# Patient Record
Sex: MALE | Race: BLACK OR AFRICAN AMERICAN | NOT HISPANIC OR LATINO | URBAN - METROPOLITAN AREA
[De-identification: names, ages, dates, MRNs, and addresses within clinical notes are randomized per-mention and may not be internally consistent; named-entity substitution may affect disease eponyms.]

---

## 2017-05-23 ENCOUNTER — EMERGENCY (EMERGENCY)
Facility: HOSPITAL | Age: 77
LOS: 1 days | Discharge: HOME | End: 2017-05-23
Attending: EMERGENCY MEDICINE
Payer: MEDICARE

## 2017-05-23 VITALS
OXYGEN SATURATION: 100 % | TEMPERATURE: 98 F | HEART RATE: 73 BPM | RESPIRATION RATE: 16 BRPM | DIASTOLIC BLOOD PRESSURE: 63 MMHG | SYSTOLIC BLOOD PRESSURE: 134 MMHG

## 2017-05-23 VITALS
DIASTOLIC BLOOD PRESSURE: 89 MMHG | HEIGHT: 68 IN | WEIGHT: 184.97 LBS | SYSTOLIC BLOOD PRESSURE: 158 MMHG | RESPIRATION RATE: 16 BRPM | HEART RATE: 78 BPM | TEMPERATURE: 99 F | OXYGEN SATURATION: 99 %

## 2017-05-23 DIAGNOSIS — M54.30 SCIATICA, UNSPECIFIED SIDE: ICD-10-CM

## 2017-05-23 DIAGNOSIS — E11.9 TYPE 2 DIABETES MELLITUS WITHOUT COMPLICATIONS: ICD-10-CM

## 2017-05-23 DIAGNOSIS — Z79.84 LONG TERM (CURRENT) USE OF ORAL HYPOGLYCEMIC DRUGS: ICD-10-CM

## 2017-05-23 DIAGNOSIS — E78.00 PURE HYPERCHOLESTEROLEMIA, UNSPECIFIED: ICD-10-CM

## 2017-05-23 LAB
APPEARANCE UR: CLEAR — SIGNIFICANT CHANGE UP
BILIRUB UR-MCNC: NEGATIVE — SIGNIFICANT CHANGE UP
COLOR SPEC: YELLOW — SIGNIFICANT CHANGE UP
DIFF PNL FLD: ABNORMAL
GLUCOSE UR QL: NEGATIVE — SIGNIFICANT CHANGE UP
KETONES UR-MCNC: NEGATIVE — SIGNIFICANT CHANGE UP
LEUKOCYTE ESTERASE UR-ACNC: NEGATIVE — SIGNIFICANT CHANGE UP
NITRITE UR-MCNC: NEGATIVE — SIGNIFICANT CHANGE UP
PH UR: 6 — SIGNIFICANT CHANGE UP (ref 5–8)
PROT UR-MCNC: 15
SP GR SPEC: 1.01 — SIGNIFICANT CHANGE UP (ref 1.01–1.02)
UROBILINOGEN FLD QL: NEGATIVE — SIGNIFICANT CHANGE UP

## 2017-05-23 PROCEDURE — 99284 EMERGENCY DEPT VISIT MOD MDM: CPT | Mod: 25

## 2017-05-23 PROCEDURE — 99284 EMERGENCY DEPT VISIT MOD MDM: CPT

## 2017-05-23 PROCEDURE — 74176 CT ABD & PELVIS W/O CONTRAST: CPT | Mod: 26

## 2017-05-23 PROCEDURE — 74176 CT ABD & PELVIS W/O CONTRAST: CPT

## 2017-05-23 PROCEDURE — 81001 URINALYSIS AUTO W/SCOPE: CPT

## 2017-05-23 RX ORDER — IBUPROFEN 200 MG
600 TABLET ORAL ONCE
Qty: 0 | Refills: 0 | Status: COMPLETED | OUTPATIENT
Start: 2017-05-23 | End: 2017-05-23

## 2017-05-23 RX ADMIN — Medication 600 MILLIGRAM(S): at 12:17

## 2017-05-23 NOTE — ED PROVIDER NOTE - MEDICAL DECISION MAKING DETAILS
76m pmhx DM, HLD, glaucoma p/w R flank pain. started 2-3 wks ago, constant, radiates down RLE, moderate, worse with movement and walking, improved with motrin. denies fall or injury, loss of strength/sensation, saddle anesthesia. no nausea/vomiting, dysuria/hematuria but states had urine in UA several months ago. on PE, mildly hypertensive, spine appears normal, range of motion is not limited, R lumbar TTP, +ve RLE straight leg raise, no CVAT b/l, no saddle anesthesia. will CT a/p, obtain UA, give motrin,

## 2017-05-23 NOTE — ED PROVIDER NOTE - OBJECTIVE STATEMENT
76m pmhx DM, HLD, glaucoma p/w R flank pain. started 2-3 wks ago, constant, radiates down RLE, moderate, worse with movement and walking, improved with motrin. denies fall or injury, loss of strength/sensation, saddle anesthesia. no nausea/vomiting, dysuria/hematuria but states had urine in UA several months ago.

## 2017-05-23 NOTE — ED PROVIDER NOTE - PHYSICAL EXAMINATION
PE: CONSTITUTIONAL: Well appearing, well nourished, in no apparent distress. ENMT: Airway patent, nasal mucosa clear, mouth with normal mucosa. HEAD: NCAT EYES: PERRL, EOMI CARDIAC: RRR, no m/r/g, no pedal edema RESPIRATORY: CTA b/l, no adventitious sounds GI: Abdomen non-distended, non-tender MSK: Spine appears normal, range of motion is not limited, R lumbar TTP, +ve RLE straight leg raise, no CVAT b/l, no saddle anesthesia NEURO: CNII-XII grossly intact, 5/5 strength, full sensation all extremities, gait intact SKIN: No rash

## 2019-06-08 ENCOUNTER — INPATIENT (INPATIENT)
Facility: HOSPITAL | Age: 79
LOS: 4 days | Discharge: HOME | DRG: 299 | End: 2019-06-13
Attending: INTERNAL MEDICINE | Admitting: INTERNAL MEDICINE
Payer: COMMERCIAL

## 2019-06-08 VITALS
HEART RATE: 90 BPM | TEMPERATURE: 98 F | SYSTOLIC BLOOD PRESSURE: 151 MMHG | RESPIRATION RATE: 16 BRPM | DIASTOLIC BLOOD PRESSURE: 85 MMHG | OXYGEN SATURATION: 97 % | HEIGHT: 68 IN | WEIGHT: 169.98 LBS

## 2019-06-08 DIAGNOSIS — I82.413 ACUTE EMBOLISM AND THROMBOSIS OF FEMORAL VEIN, BILATERAL: ICD-10-CM

## 2019-06-08 DIAGNOSIS — Z98.890 OTHER SPECIFIED POSTPROCEDURAL STATES: Chronic | ICD-10-CM

## 2019-06-08 PROBLEM — H40.9 UNSPECIFIED GLAUCOMA: Chronic | Status: ACTIVE | Noted: 2017-05-23

## 2019-06-08 PROBLEM — E78.00 PURE HYPERCHOLESTEROLEMIA, UNSPECIFIED: Chronic | Status: ACTIVE | Noted: 2017-05-23

## 2019-06-08 PROBLEM — E11.9 TYPE 2 DIABETES MELLITUS WITHOUT COMPLICATIONS: Chronic | Status: ACTIVE | Noted: 2017-05-23

## 2019-06-08 LAB
ALBUMIN SERPL ELPH-MCNC: 3.7 G/DL — SIGNIFICANT CHANGE UP (ref 3.5–5)
ALP SERPL-CCNC: 70 U/L — SIGNIFICANT CHANGE UP (ref 40–120)
ALT FLD-CCNC: 25 U/L DA — SIGNIFICANT CHANGE UP (ref 10–60)
ANION GAP SERPL CALC-SCNC: 7 MMOL/L — SIGNIFICANT CHANGE UP (ref 5–17)
APPEARANCE UR: CLEAR — SIGNIFICANT CHANGE UP
AST SERPL-CCNC: 20 U/L — SIGNIFICANT CHANGE UP (ref 10–40)
BASOPHILS # BLD AUTO: 0.01 K/UL — SIGNIFICANT CHANGE UP (ref 0–0.2)
BASOPHILS NFR BLD AUTO: 0.1 % — SIGNIFICANT CHANGE UP (ref 0–2)
BILIRUB SERPL-MCNC: 0.4 MG/DL — SIGNIFICANT CHANGE UP (ref 0.2–1.2)
BILIRUB UR-MCNC: NEGATIVE — SIGNIFICANT CHANGE UP
BUN SERPL-MCNC: 36 MG/DL — HIGH (ref 7–18)
CALCIUM SERPL-MCNC: 9.7 MG/DL — SIGNIFICANT CHANGE UP (ref 8.4–10.5)
CHLORIDE SERPL-SCNC: 102 MMOL/L — SIGNIFICANT CHANGE UP (ref 96–108)
CO2 SERPL-SCNC: 27 MMOL/L — SIGNIFICANT CHANGE UP (ref 22–31)
COLOR SPEC: YELLOW — SIGNIFICANT CHANGE UP
CREAT SERPL-MCNC: 1.68 MG/DL — HIGH (ref 0.5–1.3)
DIFF PNL FLD: ABNORMAL
EOSINOPHIL # BLD AUTO: 0.01 K/UL — SIGNIFICANT CHANGE UP (ref 0–0.5)
EOSINOPHIL NFR BLD AUTO: 0.1 % — SIGNIFICANT CHANGE UP (ref 0–6)
GLUCOSE SERPL-MCNC: 201 MG/DL — HIGH (ref 70–99)
GLUCOSE UR QL: 50 MG/DL
HCT VFR BLD CALC: 38.5 % — LOW (ref 39–50)
HGB BLD-MCNC: 12.4 G/DL — LOW (ref 13–17)
IMM GRANULOCYTES NFR BLD AUTO: 0.4 % — SIGNIFICANT CHANGE UP (ref 0–1.5)
KETONES UR-MCNC: NEGATIVE — SIGNIFICANT CHANGE UP
LEUKOCYTE ESTERASE UR-ACNC: NEGATIVE — SIGNIFICANT CHANGE UP
LYMPHOCYTES # BLD AUTO: 1.09 K/UL — SIGNIFICANT CHANGE UP (ref 1–3.3)
LYMPHOCYTES # BLD AUTO: 15.7 % — SIGNIFICANT CHANGE UP (ref 13–44)
MCHC RBC-ENTMCNC: 30.4 PG — SIGNIFICANT CHANGE UP (ref 27–34)
MCHC RBC-ENTMCNC: 32.2 GM/DL — SIGNIFICANT CHANGE UP (ref 32–36)
MCV RBC AUTO: 94.4 FL — SIGNIFICANT CHANGE UP (ref 80–100)
MONOCYTES # BLD AUTO: 0.56 K/UL — SIGNIFICANT CHANGE UP (ref 0–0.9)
MONOCYTES NFR BLD AUTO: 8.1 % — SIGNIFICANT CHANGE UP (ref 2–14)
NEUTROPHILS # BLD AUTO: 5.25 K/UL — SIGNIFICANT CHANGE UP (ref 1.8–7.4)
NEUTROPHILS NFR BLD AUTO: 75.6 % — SIGNIFICANT CHANGE UP (ref 43–77)
NITRITE UR-MCNC: NEGATIVE — SIGNIFICANT CHANGE UP
NRBC # BLD: 0 /100 WBCS — SIGNIFICANT CHANGE UP (ref 0–0)
PH UR: 5 — SIGNIFICANT CHANGE UP (ref 5–8)
PLATELET # BLD AUTO: 186 K/UL — SIGNIFICANT CHANGE UP (ref 150–400)
POTASSIUM SERPL-MCNC: 4.5 MMOL/L — SIGNIFICANT CHANGE UP (ref 3.5–5.3)
POTASSIUM SERPL-SCNC: 4.5 MMOL/L — SIGNIFICANT CHANGE UP (ref 3.5–5.3)
PROT SERPL-MCNC: 8.7 G/DL — HIGH (ref 6–8.3)
PROT UR-MCNC: 30 MG/DL
RBC # BLD: 4.08 M/UL — LOW (ref 4.2–5.8)
RBC # FLD: 12.6 % — SIGNIFICANT CHANGE UP (ref 10.3–14.5)
SODIUM SERPL-SCNC: 136 MMOL/L — SIGNIFICANT CHANGE UP (ref 135–145)
SP GR SPEC: 1.02 — SIGNIFICANT CHANGE UP (ref 1.01–1.02)
UROBILINOGEN FLD QL: NEGATIVE — SIGNIFICANT CHANGE UP
WBC # BLD: 6.95 K/UL — SIGNIFICANT CHANGE UP (ref 3.8–10.5)
WBC # FLD AUTO: 6.95 K/UL — SIGNIFICANT CHANGE UP (ref 3.8–10.5)

## 2019-06-08 PROCEDURE — 99285 EMERGENCY DEPT VISIT HI MDM: CPT

## 2019-06-08 PROCEDURE — 93970 EXTREMITY STUDY: CPT | Mod: 26

## 2019-06-08 PROCEDURE — 71045 X-RAY EXAM CHEST 1 VIEW: CPT | Mod: 26

## 2019-06-08 RX ORDER — HEPARIN SODIUM 5000 [USP'U]/ML
6500 INJECTION INTRAVENOUS; SUBCUTANEOUS ONCE
Refills: 0 | Status: COMPLETED | OUTPATIENT
Start: 2019-06-08 | End: 2019-06-08

## 2019-06-08 RX ORDER — IPRATROPIUM BROMIDE 0.2 MG/ML
500 SOLUTION, NON-ORAL INHALATION ONCE
Refills: 0 | Status: COMPLETED | OUTPATIENT
Start: 2019-06-08 | End: 2019-06-08

## 2019-06-08 RX ORDER — HEPARIN SODIUM 5000 [USP'U]/ML
INJECTION INTRAVENOUS; SUBCUTANEOUS
Qty: 25000 | Refills: 0 | Status: DISCONTINUED | OUTPATIENT
Start: 2019-06-08 | End: 2019-06-10

## 2019-06-08 RX ORDER — HEPARIN SODIUM 5000 [USP'U]/ML
3000 INJECTION INTRAVENOUS; SUBCUTANEOUS EVERY 6 HOURS
Refills: 0 | Status: DISCONTINUED | OUTPATIENT
Start: 2019-06-08 | End: 2019-06-10

## 2019-06-08 RX ORDER — CYCLOBENZAPRINE HYDROCHLORIDE 10 MG/1
10 TABLET, FILM COATED ORAL ONCE
Refills: 0 | Status: COMPLETED | OUTPATIENT
Start: 2019-06-08 | End: 2019-06-08

## 2019-06-08 RX ORDER — HEPARIN SODIUM 5000 [USP'U]/ML
6500 INJECTION INTRAVENOUS; SUBCUTANEOUS EVERY 6 HOURS
Refills: 0 | Status: DISCONTINUED | OUTPATIENT
Start: 2019-06-08 | End: 2019-06-10

## 2019-06-08 RX ORDER — ACETAMINOPHEN 500 MG
1000 TABLET ORAL ONCE
Refills: 0 | Status: COMPLETED | OUTPATIENT
Start: 2019-06-08 | End: 2019-06-08

## 2019-06-08 RX ORDER — ALBUTEROL 90 UG/1
2.5 AEROSOL, METERED ORAL ONCE
Refills: 0 | Status: COMPLETED | OUTPATIENT
Start: 2019-06-08 | End: 2019-06-08

## 2019-06-08 RX ADMIN — HEPARIN SODIUM 1400 UNIT(S)/HR: 5000 INJECTION INTRAVENOUS; SUBCUTANEOUS at 21:59

## 2019-06-08 RX ADMIN — CYCLOBENZAPRINE HYDROCHLORIDE 10 MILLIGRAM(S): 10 TABLET, FILM COATED ORAL at 17:36

## 2019-06-08 RX ADMIN — ALBUTEROL 2.5 MILLIGRAM(S): 90 AEROSOL, METERED ORAL at 11:56

## 2019-06-08 RX ADMIN — Medication 1000 MILLIGRAM(S): at 18:13

## 2019-06-08 RX ADMIN — Medication 400 MILLIGRAM(S): at 17:36

## 2019-06-08 RX ADMIN — Medication 500 MICROGRAM(S): at 11:56

## 2019-06-08 RX ADMIN — HEPARIN SODIUM 5000 UNIT(S): 5000 INJECTION INTRAVENOUS; SUBCUTANEOUS at 22:02

## 2019-06-08 NOTE — ED PROVIDER NOTE - CLINICAL SUMMARY MEDICAL DECISION MAKING FREE TEXT BOX
Patient with h/o asthma presenting with SOB, no improvement with steroids and antibiotics. Will give nebs, get CXR, and reassess. Also chronic low back pain, no evidence of acute spinal cord compression.

## 2019-06-08 NOTE — H&P ADULT - HISTORY OF PRESENT ILLNESS
77 y/o M patient with a significant PMHx of Asthma, DM, HTN, Hypercholesterolemia, Glaucoma and PSHx of craniotomy for traumatic ICH (2008) presents to the ED with sob and back pain. Patient reports he has been having worsening chronic back pain left greater than right for years, getting gradually worse for the past 3 months. Patient adds his back pain radiates to the b/l knees. Patient says he takes Tramadol for his back pain to no relief. Patient also adds he has been experiencing a cough for x2 weeks and shortness of breath for the past week. Patient says he went to Dr. Puckett where he was Rx Levaquin and methylprednisolone. Patient says he also uses Advair BID with no relief. Patient describes his phlegm as blood streaked and sometimes brown in color. Cough has improved with medication but SOB unchanged. Worse with exertion. Patient denies ha, chest pain, fever, incontinence, focal weakness, paresthesias, or any other complaints. NKDA. 79 y/o M patient with a significant PMHx of Asthma, DM, HTN, Hypercholesterolemia, Glaucoma and PSHx of craniotomy for traumatic ICH (2008) presents to the ED with sob and sharp back pain . Patient reports he has been having worsening chronic back pain left greater than right for years, getting gradually worse for the past 3 months. Patient adds his back pain radiates to the b/l knees. Patient says he takes Tramadol for his back pain to no relief. Patient also adds he has been experiencing a cough for x 2 weeks and shortness of breath for the past week. Patient says he went to Dr. Puckett where he was prescribed Levaquin and methylprednisolone. Patient says he also uses Advair BID with no relief. Patient describes his phlegm as blood streaked and sometimes brown in color. Cough has improved with medication but SOB unchanged. Worse with exertion. PE revealed B/L leg swelling and mild pain of the leg from the knee down . LE Doppler showed : Nonocclusive thrombus in bilateral superficial femoral and popliteal veins .  Pt denies numbness and tingling of the L leg, Denies fevers and chills. No pain when walking. Denies sedentary lifestyle, denies recent travel. Denies any deviation from her normal everyday activities. Denies family history of blood clots in the legs or lungs Patient denies headache , chest pain, fever, incontinence, focal weakness, paresthesias, or any other complaints. NKDA.    EKG : NSR   UA : -ve 77 y/o M patient with a significant PMHx of Asthma, DM, HTN, Hypercholesterolemia, Glaucoma and PSHx of craniotomy for traumatic ICH (2008) presents to the ED with sob and sharp back pain . Patient reports he has been having worsening chronic back pain left greater than right for years, getting gradually worse for the past 3 months. Patient adds his back pain radiates to the b/l knees. Patient says he takes Tramadol for his back pain to no relief. Patient also adds he has been experiencing a cough for x 2 weeks and shortness of breath for the past week. Patient says he went to Dr. Puckett where he was prescribed Levaquin and methylprednisolone. Patient says he also uses Advair BID with no relief. Patient describes his phlegm as blood streaked and sometimes brown in color. Cough has improved with medication but SOB unchanged. Worse with exertion. PExam revealed B/L leg swelling and mild pain of the leg from the knee down . LE Doppler showed : Nonocclusive thrombus in bilateral superficial femoral and popliteal veins .  Pt denies numbness and tingling of the L leg, Denies fevers and chills. No pain when walking. Denies sedentary lifestyle, denies recent travel. Denies any deviation from her normal everyday activities. Denies family history of blood clots in the legs or lungs Patient denies headache , chest pain, fever, incontinence, focal weakness, paresthesias, or any other complaints. NKDA.    EKG : NSR   UA : -ve

## 2019-06-08 NOTE — ED PROVIDER NOTE - PROGRESS NOTE DETAILS
Patient moved from Intake to main ED and signed out to Dr. Cadena. Libra: Patient signed out to me by Dr. Cadena to follow up DVT study. Study shows Bilateral femoral and popiteal DVTs. Will start on heparin and admit to tele with Dr. Trimble.

## 2019-06-08 NOTE — H&P ADULT - PROBLEM SELECTOR PLAN 2
recently treated with Levaquin ( ms)  f/u Bl Cx recently treated with Levaquin ( ms)  c/w levaquin for 5 days more to complete 7 days course(pt took 2 doses at home)  f/u Bl Cx

## 2019-06-08 NOTE — H&P ADULT - NSHPSOCIALHISTORY_GEN_ALL_CORE
Past smoker (quitted 50 years ago) , no Alcohol or illicit drugs , retired , used to work in [employer] , lives alone at home Past smoker (quit 50 years ago) , no Alcohol or illicit drugs , retired , used to work in [employer] , lives alone at home

## 2019-06-08 NOTE — H&P ADULT - PROBLEM SELECTOR PLAN 7
IMPROVE VTE Individual Risk Assessment    RISK                                                          Points  [x] current VTE                                           3  [] Thrombophilia                                        2  [] Lower limb paralysis                              2   [] Current Cancer                                       2   [x] Immobilization > 24 hrs                        1  [] ICU/CCU stay > 24 hours                       1  [x] Age > 60                                                   1    IMPROVE VTE Score: 5  on Heparin drip for LLE DVT  no need for GI ppx.

## 2019-06-08 NOTE — H&P ADULT - PROBLEM SELECTOR PLAN 5
- Patient takes Metformin at home  - will hold home medications  - will start sliding scale  - f/u HgA1c  - diabetic diet

## 2019-06-08 NOTE — H&P ADULT - NSHPPHYSICALEXAM_GEN_ALL_CORE
· CONSTITUTIONAL: Well appearing, well nourished, awake, alert, oriented to person, place, time/situation and in no apparent distress. , craniotomy scar   · ENMT: Airway patent, Nasal mucosa clear. Mouth with normal mucosa. Throat has no vesicles, no oropharyngeal exudates and uvula is midline.  · EYES: Clear bilaterally, pupils equal, round and reactive to light.  · CARDIAC: Normal rate, regular rhythm.  Heart sounds S1, S2.  No murmurs, rubs or gallops.  · RESPIRATORY: Breath sounds clear and equal bilaterally. No respiratory distress.  · MUSCULOSKELETAL: -ve jemima's sign , swollen calves B/L .Spine appears normal, range of motion is not limited, no joint tenderness. mild tenderness to b/l low back with mm spasm, left greater than right. no vertebral tenderness  · NEUROLOGICAL: Neuro: CNII-XII intact. Gait steady. Speech clear. Reflexes 2+/4 in all extremities. Strength 5/5 in all extremities. Normal coordination.  · SKIN: Scales and dark discoloration of RLE , warm, dry .No evidence of rash.    Vital Signs Last 24 Hrs  T(C): 36.5 (09 Jun 2019 01:54), Max: 36.8 (08 Jun 2019 10:53)  T(F): 97.7 (09 Jun 2019 01:54), Max: 98.3 (08 Jun 2019 10:53)  HR: 67 (09 Jun 2019 01:54) (67 - 97)  BP: 149/69 (09 Jun 2019 01:54) (127/63 - 151/85)  BP(mean): --  RR: 17 (09 Jun 2019 01:54) (16 - 20)  SpO2: 100% (09 Jun 2019 01:54) (97% - 100%) Vital Signs Last 24 Hrs  T(C): 36.5 (09 Jun 2019 01:54), Max: 36.8 (08 Jun 2019 10:53)  T(F): 97.7 (09 Jun 2019 01:54), Max: 98.3 (08 Jun 2019 10:53)  HR: 67 (09 Jun 2019 01:54) (67 - 97)  BP: 149/69 (09 Jun 2019 01:54) (127/63 - 151/85)  RR: 17 (09 Jun 2019 01:54) (16 - 20)  SpO2: 100% (09 Jun 2019 01:54) (97% - 100%)      · CONSTITUTIONAL: Well appearing, well nourished, awake, alert, oriented to person, place, time/situation and in no apparent distress, craniotomy scar   · ENMT: Airway patent, Nasal mucosa clear. Mouth with normal mucosa. Throat has no vesicles, no oropharyngeal exudates and uvula is midline.  · EYES: Clear bilaterally, pupils equal, round and reactive to light.  · CARDIAC: Normal rate, regular rhythm.  Heart sounds S1, S2.  No murmurs, rubs or gallops.  · RESPIRATORY: Breath sounds clear and equal bilaterally. No respiratory distress.  · MUSCULOSKELETAL: -ve jemima's sign , swollen calves B/L .Spine appears normal, range of motion is not limited, no joint tenderness. mild tenderness to b/l low back with mm spasm, left greater than right. no vertebral tenderness  · NEUROLOGICAL: Neuro: CNII-XII intact. Gait steady. Speech clear. Reflexes 2+/4 in all extremities. Strength 5/5 in all extremities. Normal coordination.  · SKIN: Scales and dark discoloration of RLE , warm, dry .No evidence of rash.

## 2019-06-08 NOTE — ED PROVIDER NOTE - MUSCULOSKELETAL, MLM
Spine appears normal, range of motion is not limited, no muscle or joint tenderness Spine appears normal, range of motion is not limited, no joint tenderness. mild tenderness to b/l low back with mm spasm, left greater than right. no vertebral tenderness

## 2019-06-08 NOTE — H&P ADULT - NSICDXPASTMEDICALHX_GEN_ALL_CORE_FT
PAST MEDICAL HISTORY:  Asthma     Diabetes     Glaucoma     HTN (hypertension)     Hypercholesterolemia

## 2019-06-08 NOTE — H&P ADULT - PROBLEM SELECTOR PLAN 4
Pt taking Lasix mg qd  c/w  Lasix 20 mg with parameters  Monitor BP closely and adjust medications if clinically indicated.  Dash diet.

## 2019-06-08 NOTE — H&P ADULT - ASSESSMENT
77 y/o M patient with a significant PMHx of Asthma, DM, HTN, Hypercholesterolemia, Glaucoma and PSHx of craniotomy for traumatic ICH (2008) presents to the ED with sob and sharp back pain . Patient reports he has been having worsening chronic back pain left greater than right for years, getting gradually worse for the past 3 months. Patient adds his back pain radiates to the b/l knees. Patient says he takes Tramadol for his back pain to no relief. Patient also adds he has been experiencing a cough for x 2 weeks and shortness of breath for the past week. Patient says he went to Dr. Puckett where he was prescribed Levaquin and methylprednisolone. Patient says he also uses Advair BID with no relief. Patient describes his phlegm as blood streaked and sometimes brown in color. Cough has improved with medication but SOB unchanged. Worse with exertion. PE revealed B/L leg swelling and mild pain of the leg from the knee down . LE Doppler showed : Nonocclusive thrombus in bilateral superficial femoral and popliteal veins .  Pt denies numbness and tingling of the L leg, Denies fevers and chills. No pain when walking. Denies sedentary lifestyle, denies recent travel. Denies any deviation from her normal everyday activities. Denies family history of blood clots in the legs or lungs Patient denies headache , chest pain, fever, incontinence, focal weakness, paresthesias, or any other complaints. NKDA.    EKG : NSR   UA : -ve     Admitted to [facility] for management of DVT and monitoring 77 y/o M patient with a significant PMHx of Asthma, DM, HTN, Hypercholesterolemia, Glaucoma and PSHx of craniotomy for traumatic ICH (2008) presents to the ED with sob and sharp back pain . Patient reports he has been having worsening chronic back pain left greater than right for years, getting gradually worse for the past 3 months. Patient adds his back pain radiates to the b/l knees. Patient says he takes Tramadol for his back pain to no relief. Patient also adds he has been experiencing a cough for x 2 weeks and shortness of breath for the past week. Patient says he went to Dr. Puckett where he was prescribed Levaquin and methylprednisolone. Patient says he also uses Advair BID with no relief. Patient describes his phlegm as blood streaked and sometimes brown in color. Cough has improved with medication but SOB unchanged. Worse with exertion. PE revealed B/L leg swelling and mild pain of the leg from the knee down . LE Doppler showed : Nonocclusive thrombus in bilateral superficial femoral and popliteal veins .  Pt denies numbness and tingling of the L leg, Denies fevers and chills. No pain when walking. Denies sedentary lifestyle, denies recent travel. Denies any deviation from her normal everyday activities. Denies family history of blood clots in the legs or lungs Patient denies headache , chest pain, fever, incontinence, focal weakness, paresthesias, or any other complaints. NKDA.    EKG : NSR   UA : -ve     Admitted to Telemetry for management of DVT, r/o PE, and monitoring

## 2019-06-08 NOTE — ED ADULT NURSE NOTE - ED STAT RN HANDOFF DETAILS 2
pt.remained  stable.denies  pain.  sadmitted to tele for + DVT B/L ext. started  on heparin  drip at 10pm.next aptt 4am. aptt drawn and sent to lab. transfer to  517 report given to cynthia singleton.not  in distress

## 2019-06-08 NOTE — H&P ADULT - ATTENDING COMMENTS
Patient seen and examined in ED. Patient's history, vitals, labs, imaging studies reviewed. Discussed with above resident, agree with note with edits, and educated on the diagnosis and management of above medical conditions. DVT - continue IV heparin, r/o PE. Plan of care discussed with patient, and agrees, all questions answered.   Maribel Trimble MD  6/8/2019 Patient seen and examined in ED. Patient's history, vitals, labs, imaging studies reviewed. Discussed with above resident, agree with note with edits, and educated on the diagnosis and management of above medical conditions. DVT - continue IV heparin, r/o PE. Plan of care discussed with patient, and agrees, all questions answered.   Maribel Trimble MD.  6/8/2019

## 2019-06-08 NOTE — ED PROVIDER NOTE - OBJECTIVE STATEMENT
77 y/o M patient with a significant PMHx of Asthma, DM, HTN, Hypercholesterolemia, Glaucoma and no significant PSHx presents to the ED with back pain. Patient reports he has been having worsening chronic back pain left greater than right. Patient adds his back pain radiates to the b/l legs. Patient says he takes Tramadol for his back pain to no relief. Patient adds he was mildly constipated this week. Patient also adds he has been experiencing a cough for x2 weeks and shortness of breath for the past week. Patient says he went to Dr. Hickman where he was Rx Levaquin and antitussives. Patient says he also uses Advir or his cough to no relief. Patient describes his phlegm as blood streaked and sometimes brown in color. Patient denies chest pain, fever, and any other complaints. NKDA. 77 y/o M patient with a significant PMHx of Asthma, DM, HTN, Hypercholesterolemia, Glaucoma and PSHx of craniotomy for traumatic ICH (2008) presents to the ED with sob and back pain. Patient reports he has been having worsening chronic back pain left greater than right for years, getting gradually worse for the past 3 months. Patient adds his back pain radiates to the b/l knees. Patient says he takes Tramadol for his back pain to no relief. Patient also adds he has been experiencing a cough for x2 weeks and shortness of breath for the past week. Patient says he went to Dr. Puckett where he was Rx Levaquin and methylprednisolone. Patient says he also uses Advair BID with no relief. Patient describes his phlegm as blood streaked and sometimes brown in color. Cough has improved with medication but SOB unchanged. Worse with exertion. Patient denies ha, chest pain, fever, incontinence, focal weakness, paresthesias, or any other complaints. NKDA.

## 2019-06-08 NOTE — H&P ADULT - PROBLEM SELECTOR PLAN 1
- p/w Bilateral leg swelling and mild pain of the leg from the knee down .   - LE Doppler showed : Nonocclusive thrombus in bilateral superficial femoral and popliteal veins  - -ve jemima's sign  - LE Doppler showed : Nonocclusive thrombus in bilateral superficial femoral and popliteal veins.  - started heparin drip  - Elevation of the LLE above the level of the heart  - f/u Hypercoagulable work-up  - f/u tumor marker  - f/u D-Dimers  - Tele monitoring - p/w Bilateral leg swelling and mild pain of the leg from the knee down .   - LE Doppler showed : Nonocclusive thrombus in bilateral superficial femoral and popliteal veins  - -ve jemima's sign  - LE Doppler showed : Nonocclusive thrombus in bilateral superficial femoral and popliteal veins.  - started heparin drip  - Elevation of the LLE above the level of the heart  - f/u Echo  - f/u Hypercoagulable work-up  - f/u tumor marker  - f/u D-Dimers  - Obtain CTA if Cr below1.4   - Tele monitoring  ** Hem/Onc consulted Dr. Nieto - p/w Bilateral leg swelling and mild pain of the leg from the knee down .   - LE Doppler showed : Nonocclusive thrombus in bilateral superficial femoral and popliteal veins  - -ve jemima's sign  - LE Doppler showed : Nonocclusive thrombus in bilateral superficial femoral and popliteal veins.  - started heparin drip  - Elevation of the LLE above the level of the heart  - f/u Echo  - f/u Hypercoagulable work-up  - f/u tumor marker  - f/u D-Dimers  - Obtain CTA if Cr below1.4   - Tele monitoring  **Hem/Onc consulted Dr. Nieto

## 2019-06-09 DIAGNOSIS — J18.9 PNEUMONIA, UNSPECIFIED ORGANISM: ICD-10-CM

## 2019-06-09 DIAGNOSIS — E78.00 PURE HYPERCHOLESTEROLEMIA, UNSPECIFIED: ICD-10-CM

## 2019-06-09 DIAGNOSIS — R06.00 DYSPNEA, UNSPECIFIED: ICD-10-CM

## 2019-06-09 DIAGNOSIS — Z71.89 OTHER SPECIFIED COUNSELING: ICD-10-CM

## 2019-06-09 DIAGNOSIS — E11.9 TYPE 2 DIABETES MELLITUS WITHOUT COMPLICATIONS: ICD-10-CM

## 2019-06-09 DIAGNOSIS — N17.9 ACUTE KIDNEY FAILURE, UNSPECIFIED: ICD-10-CM

## 2019-06-09 DIAGNOSIS — I80.00 PHLEBITIS AND THROMBOPHLEBITIS OF SUPERFICIAL VESSELS OF UNSPECIFIED LOWER EXTREMITY: ICD-10-CM

## 2019-06-09 DIAGNOSIS — I82.413 ACUTE EMBOLISM AND THROMBOSIS OF FEMORAL VEIN, BILATERAL: ICD-10-CM

## 2019-06-09 DIAGNOSIS — Z29.9 ENCOUNTER FOR PROPHYLACTIC MEASURES, UNSPECIFIED: ICD-10-CM

## 2019-06-09 DIAGNOSIS — I10 ESSENTIAL (PRIMARY) HYPERTENSION: ICD-10-CM

## 2019-06-09 LAB
ALBUMIN SERPL ELPH-MCNC: 2.9 G/DL — LOW (ref 3.5–5)
ALP SERPL-CCNC: 60 U/L — SIGNIFICANT CHANGE UP (ref 40–120)
ALT FLD-CCNC: 21 U/L DA — SIGNIFICANT CHANGE UP (ref 10–60)
ANION GAP SERPL CALC-SCNC: 9 MMOL/L — SIGNIFICANT CHANGE UP (ref 5–17)
ANION GAP SERPL CALC-SCNC: 9 MMOL/L — SIGNIFICANT CHANGE UP (ref 5–17)
APTT BLD: 107 SEC — HIGH (ref 27.5–36.3)
APTT BLD: 79.3 SEC — HIGH (ref 27.5–36.3)
APTT BLD: 83.2 SEC — HIGH (ref 27.5–36.3)
APTT BLD: 94.9 SEC — HIGH (ref 27.5–36.3)
AST SERPL-CCNC: 17 U/L — SIGNIFICANT CHANGE UP (ref 10–40)
BILIRUB SERPL-MCNC: 0.4 MG/DL — SIGNIFICANT CHANGE UP (ref 0.2–1.2)
BUN SERPL-MCNC: 24 MG/DL — HIGH (ref 7–18)
BUN SERPL-MCNC: 26 MG/DL — HIGH (ref 7–18)
CALCIUM SERPL-MCNC: 9.1 MG/DL — SIGNIFICANT CHANGE UP (ref 8.4–10.5)
CALCIUM SERPL-MCNC: 9.2 MG/DL — SIGNIFICANT CHANGE UP (ref 8.4–10.5)
CANCER AG125 SERPL-ACNC: 8 U/ML — SIGNIFICANT CHANGE UP
CANCER AG19-9 SERPL-ACNC: <2 U/ML — SIGNIFICANT CHANGE UP
CEA SERPL-MCNC: 3.6 NG/ML — SIGNIFICANT CHANGE UP (ref 0–3.8)
CHLORIDE SERPL-SCNC: 101 MMOL/L — SIGNIFICANT CHANGE UP (ref 96–108)
CHLORIDE SERPL-SCNC: 102 MMOL/L — SIGNIFICANT CHANGE UP (ref 96–108)
CO2 SERPL-SCNC: 26 MMOL/L — SIGNIFICANT CHANGE UP (ref 22–31)
CO2 SERPL-SCNC: 28 MMOL/L — SIGNIFICANT CHANGE UP (ref 22–31)
CREAT SERPL-MCNC: 1.38 MG/DL — HIGH (ref 0.5–1.3)
CREAT SERPL-MCNC: 1.41 MG/DL — HIGH (ref 0.5–1.3)
CULTURE RESULTS: SIGNIFICANT CHANGE UP
D DIMER BLD IA.RAPID-MCNC: 4106 NG/ML DDU — HIGH
ERYTHROCYTE [SEDIMENTATION RATE] IN BLOOD: 52 MM/HR — HIGH (ref 0–20)
GLUCOSE BLDC GLUCOMTR-MCNC: 101 MG/DL — HIGH (ref 70–99)
GLUCOSE BLDC GLUCOMTR-MCNC: 133 MG/DL — HIGH (ref 70–99)
GLUCOSE BLDC GLUCOMTR-MCNC: 216 MG/DL — HIGH (ref 70–99)
GLUCOSE BLDC GLUCOMTR-MCNC: 84 MG/DL — SIGNIFICANT CHANGE UP (ref 70–99)
GLUCOSE SERPL-MCNC: 270 MG/DL — HIGH (ref 70–99)
GLUCOSE SERPL-MCNC: 91 MG/DL — SIGNIFICANT CHANGE UP (ref 70–99)
HCT VFR BLD CALC: 34.4 % — LOW (ref 39–50)
HGB BLD-MCNC: 11.3 G/DL — LOW (ref 13–17)
MCHC RBC-ENTMCNC: 30.6 PG — SIGNIFICANT CHANGE UP (ref 27–34)
MCHC RBC-ENTMCNC: 32.8 GM/DL — SIGNIFICANT CHANGE UP (ref 32–36)
MCV RBC AUTO: 93.2 FL — SIGNIFICANT CHANGE UP (ref 80–100)
NRBC # BLD: 0 /100 WBCS — SIGNIFICANT CHANGE UP (ref 0–0)
PLATELET # BLD AUTO: 172 K/UL — SIGNIFICANT CHANGE UP (ref 150–400)
POTASSIUM SERPL-MCNC: 4 MMOL/L — SIGNIFICANT CHANGE UP (ref 3.5–5.3)
POTASSIUM SERPL-MCNC: 4.3 MMOL/L — SIGNIFICANT CHANGE UP (ref 3.5–5.3)
POTASSIUM SERPL-SCNC: 4 MMOL/L — SIGNIFICANT CHANGE UP (ref 3.5–5.3)
POTASSIUM SERPL-SCNC: 4.3 MMOL/L — SIGNIFICANT CHANGE UP (ref 3.5–5.3)
PROT SERPL-MCNC: 7.1 G/DL — SIGNIFICANT CHANGE UP (ref 6–8.3)
RBC # BLD: 3.69 M/UL — LOW (ref 4.2–5.8)
RBC # FLD: 12.6 % — SIGNIFICANT CHANGE UP (ref 10.3–14.5)
SODIUM SERPL-SCNC: 136 MMOL/L — SIGNIFICANT CHANGE UP (ref 135–145)
SODIUM SERPL-SCNC: 139 MMOL/L — SIGNIFICANT CHANGE UP (ref 135–145)
SPECIMEN SOURCE: SIGNIFICANT CHANGE UP
WBC # BLD: 6.8 K/UL — SIGNIFICANT CHANGE UP (ref 3.8–10.5)
WBC # FLD AUTO: 6.8 K/UL — SIGNIFICANT CHANGE UP (ref 3.8–10.5)

## 2019-06-09 PROCEDURE — 93306 TTE W/DOPPLER COMPLETE: CPT | Mod: 26

## 2019-06-09 RX ORDER — DOCUSATE SODIUM 100 MG
100 CAPSULE ORAL
Refills: 0 | Status: DISCONTINUED | OUTPATIENT
Start: 2019-06-09 | End: 2019-06-13

## 2019-06-09 RX ORDER — SODIUM CHLORIDE 9 MG/ML
1000 INJECTION INTRAMUSCULAR; INTRAVENOUS; SUBCUTANEOUS
Refills: 0 | Status: DISCONTINUED | OUTPATIENT
Start: 2019-06-09 | End: 2019-06-09

## 2019-06-09 RX ORDER — SIMVASTATIN 20 MG/1
20 TABLET, FILM COATED ORAL AT BEDTIME
Refills: 0 | Status: DISCONTINUED | OUTPATIENT
Start: 2019-06-09 | End: 2019-06-13

## 2019-06-09 RX ORDER — LATANOPROST 0.05 MG/ML
1 SOLUTION/ DROPS OPHTHALMIC; TOPICAL AT BEDTIME
Refills: 0 | Status: DISCONTINUED | OUTPATIENT
Start: 2019-06-09 | End: 2019-06-13

## 2019-06-09 RX ORDER — TRAMADOL HYDROCHLORIDE 50 MG/1
50 TABLET ORAL EVERY 8 HOURS
Refills: 0 | Status: DISCONTINUED | OUTPATIENT
Start: 2019-06-09 | End: 2019-06-12

## 2019-06-09 RX ORDER — TAMSULOSIN HYDROCHLORIDE 0.4 MG/1
0.4 CAPSULE ORAL AT BEDTIME
Refills: 0 | Status: DISCONTINUED | OUTPATIENT
Start: 2019-06-09 | End: 2019-06-13

## 2019-06-09 RX ORDER — POLYETHYLENE GLYCOL 3350 17 G/17G
17 POWDER, FOR SOLUTION ORAL DAILY
Refills: 0 | Status: DISCONTINUED | OUTPATIENT
Start: 2019-06-09 | End: 2019-06-11

## 2019-06-09 RX ORDER — IBUPROFEN 200 MG
1 TABLET ORAL
Qty: 0 | Refills: 0 | DISCHARGE

## 2019-06-09 RX ORDER — INSULIN LISPRO 100/ML
VIAL (ML) SUBCUTANEOUS
Refills: 0 | Status: DISCONTINUED | OUTPATIENT
Start: 2019-06-09 | End: 2019-06-13

## 2019-06-09 RX ORDER — LIDOCAINE 4 G/100G
1 CREAM TOPICAL DAILY
Refills: 0 | Status: DISCONTINUED | OUTPATIENT
Start: 2019-06-09 | End: 2019-06-13

## 2019-06-09 RX ORDER — SODIUM CHLORIDE 9 MG/ML
1000 INJECTION INTRAMUSCULAR; INTRAVENOUS; SUBCUTANEOUS
Refills: 0 | Status: DISCONTINUED | OUTPATIENT
Start: 2019-06-09 | End: 2019-06-11

## 2019-06-09 RX ORDER — FUROSEMIDE 40 MG
20 TABLET ORAL DAILY
Refills: 0 | Status: DISCONTINUED | OUTPATIENT
Start: 2019-06-09 | End: 2019-06-09

## 2019-06-09 RX ADMIN — LATANOPROST 1 DROP(S): 0.05 SOLUTION/ DROPS OPHTHALMIC; TOPICAL at 21:45

## 2019-06-09 RX ADMIN — HEPARIN SODIUM 1200 UNIT(S)/HR: 5000 INJECTION INTRAVENOUS; SUBCUTANEOUS at 05:41

## 2019-06-09 RX ADMIN — POLYETHYLENE GLYCOL 3350 17 GRAM(S): 17 POWDER, FOR SOLUTION ORAL at 12:22

## 2019-06-09 RX ADMIN — TRAMADOL HYDROCHLORIDE 50 MILLIGRAM(S): 50 TABLET ORAL at 18:36

## 2019-06-09 RX ADMIN — Medication 100 MILLIGRAM(S): at 18:36

## 2019-06-09 RX ADMIN — Medication 20 MILLIGRAM(S): at 05:43

## 2019-06-09 RX ADMIN — LIDOCAINE 1 PATCH: 4 CREAM TOPICAL at 12:22

## 2019-06-09 RX ADMIN — TAMSULOSIN HYDROCHLORIDE 0.4 MILLIGRAM(S): 0.4 CAPSULE ORAL at 21:45

## 2019-06-09 RX ADMIN — TRAMADOL HYDROCHLORIDE 50 MILLIGRAM(S): 50 TABLET ORAL at 06:52

## 2019-06-09 RX ADMIN — HEPARIN SODIUM 1200 UNIT(S)/HR: 5000 INJECTION INTRAVENOUS; SUBCUTANEOUS at 18:37

## 2019-06-09 RX ADMIN — Medication 2: at 12:22

## 2019-06-09 RX ADMIN — Medication 100 MILLIGRAM(S): at 06:52

## 2019-06-09 RX ADMIN — SODIUM CHLORIDE 75 MILLILITER(S): 9 INJECTION INTRAMUSCULAR; INTRAVENOUS; SUBCUTANEOUS at 08:30

## 2019-06-09 RX ADMIN — LIDOCAINE 1 PATCH: 4 CREAM TOPICAL at 21:45

## 2019-06-09 RX ADMIN — SIMVASTATIN 20 MILLIGRAM(S): 20 TABLET, FILM COATED ORAL at 21:45

## 2019-06-09 RX ADMIN — HEPARIN SODIUM 1200 UNIT(S)/HR: 5000 INJECTION INTRAVENOUS; SUBCUTANEOUS at 12:23

## 2019-06-09 NOTE — PROGRESS NOTE ADULT - PROBLEM SELECTOR PLAN 6
pt takes Simvastatin 20 mg at home  c/w Simvastatin 20 mg   f/u lipid profile. C/w Simvastatin 20 mg   F/u lipid profile

## 2019-06-09 NOTE — PROGRESS NOTE ADULT - PROBLEM SELECTOR PLAN 3
Cr : 1.68   unknown baseline   IVF   f/u BMP at AM Cr : 1.68 on admission, improving  unknown baseline   IVF   f/u BMP at AM Cr 1.68 on admission, improving  - Unknown baseline   - C/w IVF   - F/u BMP qd

## 2019-06-09 NOTE — PROGRESS NOTE ADULT - PROBLEM SELECTOR PLAN 4
Pt taking Lasix mg qd  c/w  Lasix 20 mg with parameters  Monitor BP closely and adjust medications if clinically indicated.  Dash diet. Pt taking Lasix at home  Monitor BP closely and adjust medications if clinically indicated.  Dash diet. On lasix at home  - Monitor BP closely and adjust medications if clinically indicated.  - DASH diet

## 2019-06-09 NOTE — PROGRESS NOTE ADULT - PROBLEM SELECTOR PLAN 5
- Patient takes Metformin at home  - will hold home medications  - will start sliding scale  - f/u HgA1c  - diabetic diet On Metformin at home  - Sliding scale  - F/u A1C

## 2019-06-09 NOTE — CONSULT NOTE ADULT - SUBJECTIVE AND OBJECTIVE BOX
Patient is a 78y old  Male who presents with a chief complaint of B/L LE DVT (09 Jun 2019 10:30)      HPI:  77 y/o M patient with a significant PMHx of Asthma, DM, HTN, Hypercholesterolemia, Glaucoma and PSHx of craniotomy for traumatic ICH (2008) presents to the ED with sob and sharp back pain . Patient reports he has been having worsening chronic back pain left greater than right for years, getting gradually worse for the past 3 months. Patient adds his back pain radiates to the b/l knees. Patient says he takes Tramadol for his back pain to no relief. Patient also adds he has been experiencing a cough for x 2 weeks and shortness of breath for the past week. Patient says he went to Dr. Puckett where he was prescribed Levaquin and methylprednisolone. Patient says he also uses Advair BID with no relief. Patient describes his phlegm as blood streaked and sometimes brown in color. Cough has improved with medication but SOB unchanged. Worse with exertion. PExam revealed B/L leg swelling and mild pain of the leg from the knee down . LE Doppler showed : Nonocclusive thrombus in bilateral superficial femoral and popliteal veins .  Pt denies numbness and tingling of the L leg, Denies fevers and chills. No pain when walking. Denies sedentary lifestyle, denies recent travel. Denies any deviation from her normal everyday activities. Denies family history of blood clots in the legs or lungs Patient denies headache , chest pain, fever, incontinence, focal weakness, paresthesias, or any other complaints. NKDA.  his sister said he lost a lot of weight lately.  No apin, fever or chills, but has a lot of cough.  he stopped smoking 14 years ago.    EKG : NSR   UA : -ve (08 Jun 2019 23:58)       ROS:  Negative except for:    PAST MEDICAL & SURGICAL HISTORY:  HTN (hypertension)  Asthma  Glaucoma  Hypercholesterolemia  Diabetes  H/O craniotomy      SOCIAL HISTORY:    FAMILY HISTORY:  No pertinent family history in first degree relatives      MEDICATIONS  (STANDING):  docusate sodium 100 milliGRAM(s) Oral two times a day  heparin  Infusion.  Unit(s)/Hr (14 mL/Hr) IV Continuous <Continuous>  insulin lispro (HumaLOG) corrective regimen sliding scale   SubCutaneous Before meals and at bedtime  latanoprost 0.005% Ophthalmic Solution 1 Drop(s) Both EYES at bedtime  levoFLOXacin IVPB      lidocaine   Patch 1 Patch Transdermal daily  simvastatin 20 milliGRAM(s) Oral at bedtime  sodium chloride 0.9%. 1000 milliLiter(s) (75 mL/Hr) IV Continuous <Continuous>  tamsulosin 0.4 milliGRAM(s) Oral at bedtime    MEDICATIONS  (PRN):  heparin  Injectable 6500 Unit(s) IV Push every 6 hours PRN For aPTT less than 40  heparin  Injectable 3000 Unit(s) IV Push every 6 hours PRN For aPTT between 40 - 57  polyethylene glycol 3350 17 Gram(s) Oral daily PRN Constipation  traMADol 50 milliGRAM(s) Oral every 8 hours PRN Moderate Pain (4 - 6)      Allergies    No Known Allergies    Intolerances        Vital Signs Last 24 Hrs  T(C): 36.6 (09 Jun 2019 11:00), Max: 36.8 (08 Jun 2019 23:53)  T(F): 97.8 (09 Jun 2019 11:00), Max: 98.3 (08 Jun 2019 23:53)  HR: 76 (09 Jun 2019 11:00) (67 - 97)  BP: 120/74 (09 Jun 2019 11:00) (120/74 - 149/69)  BP(mean): --  RR: 17 (09 Jun 2019 11:00) (17 - 20)  SpO2: 99% (09 Jun 2019 11:00) (98% - 100%)    PHYSICAL EXAM  General: adult in NAD  HEENT: clear oropharynx, anicteric sclera, pink conjunctiva  Neck: supple  CV: normal S1/S2 with no murmur rubs or gallops  Lungs: positive air movement b/l ant lungs,clear to auscultation, no wheezes, no rales  Abdomen: soft non-tender non-distended, no hepatosplenomegaly  Ext: no clubbing cyanosis or edema  Skin: no rashes and no petechiae  Neuro: alert and oriented X 4, no focal deficits      LABS:                          11.3   6.80  )-----------( 172      ( 09 Jun 2019 04:13 )             34.4         Mean Cell Volume : 93.2 fl  Mean Cell Hemoglobin : 30.6 pg  Mean Cell Hemoglobin Concentration : 32.8 gm/dL  Auto Neutrophil # : x  Auto Lymphocyte # : x  Auto Monocyte # : x  Auto Eosinophil # : x  Auto Basophil # : x  Auto Neutrophil % : x  Auto Lymphocyte % : x  Auto Monocyte % : x  Auto Eosinophil % : x  Auto Basophil % : x      Serial CBC's  06-09 @ 04:13  Hct-34.4 / Hgb-11.3 / Plat-172 / RBC-3.69 / WBC-6.80  Serial CBC's  06-08 @ 11:54  Hct-38.5 / Hgb-12.4 / Plat-186 / RBC-4.08 / WBC-6.95      06-09    136  |  101  |  26<H>  ----------------------------<  270<H>  4.0   |  26  |  1.41<H>    Ca    9.1      09 Jun 2019 10:36    TPro  7.1  /  Alb  2.9<L>  /  TBili  0.4  /  DBili  x   /  AST  17  /  ALT  21  /  AlkPhos  60  06-09      PTT - ( 09 Jun 2019 11:34 )  PTT:83.2 sec                BLOOD SMEAR INTERPRETATION:       RADIOLOGY & ADDITIONAL STUDIES:  < from: US Duplex Venous Lower Ext Complete, Bilateral (06.08.19 @ 19:31) >  INTERPRETATION:    HISTORY: Bilateral lower extremity edema and pain    TECHNIQUE: Venous duplex ultrasonography was performed on the bilateral   lower extremities.    COMPARISON: None    FINDINGS: Bilateral common femoral veins demonstrate normal flow and   compression.     Nonocclusive thrombus is present in bilateral superficial femoral and   popliteal veins.    The calf veins are within normal limits bilaterally. No fluid collections   are noted.    IMPRESSION:     Nonocclusive thrombus in bilateral superficial femoral and popliteal   veins.    Findings discussed with Dr. Smyth  by Dr. Fitzpatrick on 6/8/2019 at   7:45 PM  with read back.    < end of copied text >  < from: Xray Chest 1 View AP/PA (06.08.19 @ 12:32) >  INTERPRETATION:  CLINICAL INDICATION: 78 years  Male with cough, sob x 2   weeks.    COMPARISON: 4/8/2013    AP view of the chest demonstrates the lungs to be clear. There is no   pleural effusion. There is no pneumothorax.    The heart is mildly enlarged. There is no mediastinal or hilar mass.     The pulmonary vasculature is normal.     Mild thoracic degenerative changes are present.    IMPRESSION:    Mild cardiomegaly. No acute infiltrate.    < end of copied text >

## 2019-06-09 NOTE — PROGRESS NOTE ADULT - PROBLEM SELECTOR PLAN 8
Pt is full code   next of kin is Reed Ms. Mansfield [phone] Pt is full code   Next of kin is daughter Shyla [phone]

## 2019-06-09 NOTE — PROGRESS NOTE ADULT - ATTENDING COMMENTS
Patient seen and examined in ED. Patient's history, vitals, labs, imaging studies reviewed. Discussed with above resident, agree with note with edits, and educated on the diagnosis and management of above medical conditions. f/u CTA r/o PE. Plan of care discussed with patient, and agrees, all questions answered.   Maribel Trimble MD. Patient seen and examined in ED. Patient's history, vitals, labs, imaging studies reviewed. Discussed with above resident, agree with note with edits, and educated on the diagnosis and management of above medical conditions. f/u CTA r/o PE. Plan of care discussed with patient, and agrees, all questions answered.   Maribel Trimble MD

## 2019-06-09 NOTE — PROGRESS NOTE ADULT - SUBJECTIVE AND OBJECTIVE BOX
PGY1 Note discussed with Supervising Resident and Primary Attending.    Patient is a 78y old  Male who presents with a chief complaint of B/L LE DVT (2019 23:58)      INTERVAL HPI/OVERNIGHT EVENTS :  No acute overnight events. Patient seen and examined at bedside. Patient has no current complaints. Patient denies nausea, vomiting, diarrhea, chest pain, SOB, headache.  MEDICATIONS  (STANDING):  docusate sodium 100 milliGRAM(s) Oral two times a day  furosemide    Tablet 20 milliGRAM(s) Oral daily  heparin  Infusion.  Unit(s)/Hr (14 mL/Hr) IV Continuous <Continuous>  insulin lispro (HumaLOG) corrective regimen sliding scale   SubCutaneous Before meals and at bedtime  latanoprost 0.005% Ophthalmic Solution 1 Drop(s) Both EYES at bedtime  levoFLOXacin IVPB      lidocaine   Patch 1 Patch Transdermal daily  simvastatin 20 milliGRAM(s) Oral at bedtime  sodium chloride 0.9%. 1000 milliLiter(s) (75 mL/Hr) IV Continuous <Continuous>  tamsulosin 0.4 milliGRAM(s) Oral at bedtime    MEDICATIONS  (PRN):  heparin  Injectable 6500 Unit(s) IV Push every 6 hours PRN For aPTT less than 40  heparin  Injectable 3000 Unit(s) IV Push every 6 hours PRN For aPTT between 40 - 57  polyethylene glycol 3350 17 Gram(s) Oral daily PRN Constipation  traMADol 50 milliGRAM(s) Oral every 8 hours PRN Moderate Pain (4 - 6)      Allergies    No Known Allergies    Intolerances        REVIEW OF SYSTEMS :  * General: denies chills, fatigue  * Eyes: denies vision changes  * Respiratory: denies cough, SOB, wheezing  * Cardio: denies chest pain, palpitations  * GI: denies abd pain, nausea, vomiting, diarrhea  * : denies dysuria  * Neuro: denies headaches, numbness, weakness  * MSK: denies joint pain  * Skin: denies itching, rashes    Vital Signs Last 24 Hrs  T(C): 36.5 (2019 01:54), Max: 36.8 (2019 10:53)  T(F): 97.7 (2019 01:54), Max: 98.3 (2019 10:53)  HR: 67 (2019 01:54) (67 - 97)  BP: 149/69 (2019 01:54) (127/63 - 151/85)  BP(mean): --  RR: 17 (2019 01:54) (16 - 20)  SpO2: 100% (2019 01:54) (97% - 100%)    PHYSICAL EXAM :  * General: no acute distress, well-nourished, well-developed  * HEENT: atraumatic, normocephalic; moist mucus membranes; supple neck; no JVD  * CN: EOMI, PERRL  * Respiratory: cta b/l; no wheezes, rales, rhonchi  * Cardio: RRR; no appreciable murmurs, rubs, gallops  * Abd: soft, nontender, nondistended, +BS  * Neuro: AAOx3; strength 5/5; sensation intact throughout  * Extremities: no clubbing, cyanosis, edema  * Skin: no rashes    LABS:                          11.3   6.80  )-----------( 172      ( 2019 04:13 )             34.4     06-08    136  |  102  |  36<H>  ----------------------------<  201<H>  4.5   |  27  |  1.68<H>    Ca    9.7      2019 11:54    TPro  8.7<H>  /  Alb  3.7  /  TBili  0.4  /  DBili  x   /  AST  20  /  ALT  25  /  AlkPhos  70  06-08    PTT - ( 2019 04:13 )  PTT:107.0 sec  Urinalysis Basic - ( 2019 14:20 )    Color: Yellow / Appearance: Clear / S.020 / pH: x  Gluc: x / Ketone: Negative  / Bili: Negative / Urobili: Negative   Blood: x / Protein: 30 mg/dL / Nitrite: Negative   Leuk Esterase: Negative / RBC: 5-10 /HPF / WBC 3-5 /HPF   Sq Epi: x / Non Sq Epi: Occasional /HPF / Bacteria: Negative /HPF      CAPILLARY BLOOD GLUCOSE      POCT Blood Glucose.: 101 mg/dL (2019 07:35)      RADIOLOGY & ADDITIONAL TESTS:   no new imaging    Imaging Personally Reviewed:    Consultant(s) Notes Reviewed: PGY1 Note discussed with Supervising Resident and Primary Attending.    Patient is a 78 year old  Male who presents with a chief complaint of B/L LE DVT (2019 23:58)    INTERVAL HPI/OVERNIGHT EVENTS: No acute overnight events. Patient seen and examined at bedside. Patient has no current complaints. Patient denies nausea, vomiting, diarrhea, chest pain, SOB, headache.    MEDICATIONS  (STANDING):  docusate sodium 100 milliGRAM(s) Oral two times a day  furosemide    Tablet 20 milliGRAM(s) Oral daily  heparin  Infusion.  Unit(s)/Hr (14 mL/Hr) IV Continuous <Continuous>  insulin lispro (HumaLOG) corrective regimen sliding scale   SubCutaneous Before meals and at bedtime  latanoprost 0.005% Ophthalmic Solution 1 Drop(s) Both EYES at bedtime  levoFLOXacin IVPB      lidocaine   Patch 1 Patch Transdermal daily  simvastatin 20 milliGRAM(s) Oral at bedtime  sodium chloride 0.9%. 1000 milliLiter(s) (75 mL/Hr) IV Continuous <Continuous>  tamsulosin 0.4 milliGRAM(s) Oral at bedtime    MEDICATIONS  (PRN):  heparin  Injectable 6500 Unit(s) IV Push every 6 hours PRN For aPTT less than 40  heparin  Injectable 3000 Unit(s) IV Push every 6 hours PRN For aPTT between 40 - 57  polyethylene glycol 3350 17 Gram(s) Oral daily PRN Constipation  traMADol 50 milliGRAM(s) Oral every 8 hours PRN Moderate Pain (4 - 6)      Allergies: No Known Allergies      REVIEW OF SYSTEMS :  * General: denies chills, fatigue  * Eyes: denies vision changes  * Respiratory: denies cough, has SOB, no wheezing  * Cardio: denies chest pain, palpitations  * GI: denies abd pain, nausea, vomiting, diarrhea  * : denies dysuria  * Neuro: denies headaches, numbness, weakness  * MSK: denies joint pain  * Skin: denies itching, rashes    Vital Signs Last 24 Hrs  T(C): 36.5 (2019 01:54), Max: 36.8 (2019 10:53)  T(F): 97.7 (2019 01:54), Max: 98.3 (2019 10:53)  HR: 67 (2019 01:54) (67 - 97)  BP: 149/69 (2019 01:54) (127/63 - 151/85)  RR: 17 (2019 01:54) (16 - 20)  SpO2: 100% (2019 01:54) (97% - 100%)    PHYSICAL EXAM :  * General: no acute distress, well-nourished, well-developed  * HEENT: atraumatic, normocephalic; moist mucus membranes; supple neck; no JVD  * CN: EOMI, PERRL  * Respiratory: cta b/l; no wheezes, rales, rhonchi  * Cardio: RRR; no appreciable murmurs, rubs, gallops  * Abd: soft, nontender, nondistended, +BS  * Neuro: AAOx3; strength 5/5; sensation intact throughout  * Extremities: no clubbing, cyanosis, edema  * Skin: no rashes    LABS:                          11.3   6.80  )-----------( 172      ( 2019 04:13 )             34.4     06-08    136  |  102  |  36<H>  ----------------------------<  201<H>  4.5   |  27  |  1.68<H>    Ca    9.7      2019 11:54    TPro  8.7<H>  /  Alb  3.7  /  TBili  0.4  /  DBili  x   /  AST  20  /  ALT  25  /  AlkPhos  70  06-08    PTT - ( 2019 04:13 )  PTT:107.0 sec  Urinalysis Basic - ( 2019 14:20 )    Color: Yellow / Appearance: Clear / S.020 / pH: x  Gluc: x / Ketone: Negative  / Bili: Negative / Urobili: Negative   Blood: x / Protein: 30 mg/dL / Nitrite: Negative   Leuk Esterase: Negative / RBC: 5-10 /HPF / WBC 3-5 /HPF   Sq Epi: x / Non Sq Epi: Occasional /HPF / Bacteria: Negative /HPF      CAPILLARY BLOOD GLUCOSE  POCT Blood Glucose.: 101 mg/dL (2019 07:35)      RADIOLOGY & ADDITIONAL TESTS:   no new imaging      Consultant(s) Notes Reviewed: yes PGY1 Note discussed with Supervising Resident and Primary Attending.    Patient is a 78 year old  Male who presents with a chief complaint of B/L LE DVT (2019 23:58)    INTERVAL HPI/OVERNIGHT EVENTS: No acute overnight events. Patient seen and examined at bedside. Patient reporting back pain. Patient denies nausea, vomiting, diarrhea, chest pain, SOB, headache.    MEDICATIONS  (STANDING):  docusate sodium 100 milliGRAM(s) Oral two times a day  furosemide    Tablet 20 milliGRAM(s) Oral daily  heparin  Infusion.  Unit(s)/Hr (14 mL/Hr) IV Continuous <Continuous>  insulin lispro (HumaLOG) corrective regimen sliding scale   SubCutaneous Before meals and at bedtime  latanoprost 0.005% Ophthalmic Solution 1 Drop(s) Both EYES at bedtime  levoFLOXacin IVPB      lidocaine   Patch 1 Patch Transdermal daily  simvastatin 20 milliGRAM(s) Oral at bedtime  sodium chloride 0.9%. 1000 milliLiter(s) (75 mL/Hr) IV Continuous <Continuous>  tamsulosin 0.4 milliGRAM(s) Oral at bedtime    MEDICATIONS  (PRN):  heparin  Injectable 6500 Unit(s) IV Push every 6 hours PRN For aPTT less than 40  heparin  Injectable 3000 Unit(s) IV Push every 6 hours PRN For aPTT between 40 - 57  polyethylene glycol 3350 17 Gram(s) Oral daily PRN Constipation  traMADol 50 milliGRAM(s) Oral every 8 hours PRN Moderate Pain (4 - 6)      Allergies: No Known Allergies      REVIEW OF SYSTEMS:  * General: denies chills, fatigue  * Eyes: denies vision changes  * Respiratory: denies cough, has SOB, no wheezing  * Cardio: denies chest pain, palpitations  * GI: denies abd pain, nausea, vomiting, diarrhea  * : denies dysuria  * Neuro: denies headaches, numbness, weakness  * MSK: denies joint pain; reports low back pain  * Skin: denies itching, rashes    Vital Signs Last 24 Hrs  T(C): 36.5 (2019 01:54), Max: 36.8 (2019 10:53)  T(F): 97.7 (2019 01:54), Max: 98.3 (2019 10:53)  HR: 67 (2019 01:54) (67 - 97)  BP: 149/69 (2019 01:54) (127/63 - 151/85)  RR: 17 (2019 01:54) (16 - 20)  SpO2: 100% (2019 01:54) (97% - 100%)    PHYSICAL EXAM:  * General: no acute distress, well-nourished, well-developed  * HEENT: atraumatic, normocephalic; moist mucus membranes; supple neck; no JVD  * CN: EOMI, PERRL  * Respiratory: cta b/l; no wheezes, rales, rhonchi  * Cardio: RRR; no appreciable murmurs, rubs, gallops  * Abd: soft, nontender, nondistended, +BS  * Neuro: AAOx3; strength 5/5; sensation intact throughout  * Extremities: no clubbing, cyanosis, edema  * Skin: no rashes    LABS:                          11.3   6.80  )-----------( 172      ( 2019 04:13 )             34.4     06-    136  |  102  |  36<H>  ----------------------------<  201<H>  4.5   |  27  |  1.68<H>    Ca    9.7      2019 11:54    TPro  8.7<H>  /  Alb  3.7  /  TBili  0.4  /  DBili  x   /  AST  20  /  ALT  25  /  AlkPhos  70  06-08    PTT - ( 2019 04:13 )  PTT:107.0 sec  Urinalysis Basic - ( 2019 14:20 )    Color: Yellow / Appearance: Clear / S.020 / pH: x  Gluc: x / Ketone: Negative  / Bili: Negative / Urobili: Negative   Blood: x / Protein: 30 mg/dL / Nitrite: Negative   Leuk Esterase: Negative / RBC: 5-10 /HPF / WBC 3-5 /HPF   Sq Epi: x / Non Sq Epi: Occasional /HPF / Bacteria: Negative /HPF      CAPILLARY BLOOD GLUCOSE  POCT Blood Glucose.: 101 mg/dL (2019 07:35)      RADIOLOGY & ADDITIONAL TESTS:   no new imaging    Consultant(s) Notes Reviewed: yes

## 2019-06-09 NOTE — PROGRESS NOTE ADULT - PROBLEM SELECTOR PLAN 2
recently treated with Levaquin ( ms)  c/w levaquin for 5 days more to complete 7 days course(pt took 2 doses at home)  f/u Bl Cx Recently treated with levaquin ( ms)  - C/w levaquin for 5 days more to complete 7 days course  - Afebrile, no leukocytosis  - F/u blood cx

## 2019-06-09 NOTE — CONSULT NOTE ADULT - PROBLEM SELECTOR RECOMMENDATION 2
and cpugh with blood tinged sputum  exsmoker  will do CT angio  pt can be on lovenox or NOAC  CrCl >30

## 2019-06-09 NOTE — PROGRESS NOTE ADULT - PROBLEM SELECTOR PLAN 1
- p/w Bilateral leg swelling and mild pain of the leg from the knee down .   - LE Doppler showed : Nonocclusive thrombus in bilateral superficial femoral and popliteal veins  - -ve jemima's sign  - LE Doppler showed : Nonocclusive thrombus in bilateral superficial femoral and popliteal veins.  - started heparin drip  - Elevation of the LLE above the level of the heart  - f/u Echo  - f/u Hypercoagulable work-up  - f/u tumor marker  - f/u D-Dimers  - Obtain CTA if Cr below1.4   - Tele monitoring  **Hem/Onc consulted Dr. Nieto P/w b/l LE swelling and mild pain of the leg from the knee down  - LE doppler showed nonocclusive thrombus in bilateral superficial femoral and popliteal veins  - -ve jemima's sign  - D dimer of 4000  - C/w heparin drip  - Elevation of the LLE above the level of the heart  - F/u echo  - F/u hypercoagulable work-up  - F/u tumor markers  - F/u CT angio when Cr <1.4  - Heme consult Dr Nieto P/w b/l LE swelling and mild pain of the leg from the knee down  - LE doppler showed nonocclusive thrombus in bilateral superficial femoral and popliteal veins  - HD stable, no SOB  - -ve jemima's sign  - D dimer of 4000  - C/w heparin drip  - Elevation of the LLE above the level of the heart  - F/u echo  - F/u hypercoagulable work-up  - F/u tumor markers  - F/u routine CT angio to r/o PE, patient already on heparin drip  - Heme consult Dr Nieto

## 2019-06-10 DIAGNOSIS — R22.2 LOCALIZED SWELLING, MASS AND LUMP, TRUNK: ICD-10-CM

## 2019-06-10 DIAGNOSIS — M54.5 LOW BACK PAIN: ICD-10-CM

## 2019-06-10 LAB
APTT BLD: 93.5 SEC — HIGH (ref 27.5–36.3)
DRVVT SCREEN TO CONFIRM RATIO: SIGNIFICANT CHANGE UP
GLUCOSE BLDC GLUCOMTR-MCNC: 114 MG/DL — HIGH (ref 70–99)
GLUCOSE BLDC GLUCOMTR-MCNC: 151 MG/DL — HIGH (ref 70–99)
GLUCOSE BLDC GLUCOMTR-MCNC: 152 MG/DL — HIGH (ref 70–99)
GLUCOSE BLDC GLUCOMTR-MCNC: 175 MG/DL — HIGH (ref 70–99)
GLUCOSE BLDC GLUCOMTR-MCNC: 221 MG/DL — HIGH (ref 70–99)
HCT VFR BLD CALC: 35.9 % — LOW (ref 39–50)
HGB BLD-MCNC: 11.7 G/DL — LOW (ref 13–17)
LA NT DPL PPP QL: 37.6 SEC — SIGNIFICANT CHANGE UP
MCHC RBC-ENTMCNC: 30.5 PG — SIGNIFICANT CHANGE UP (ref 27–34)
MCHC RBC-ENTMCNC: 32.6 GM/DL — SIGNIFICANT CHANGE UP (ref 32–36)
MCV RBC AUTO: 93.5 FL — SIGNIFICANT CHANGE UP (ref 80–100)
NORMALIZED SCT PPP-RTO: 0.63 RATIO — SIGNIFICANT CHANGE UP (ref 0–1.16)
NORMALIZED SCT PPP-RTO: SIGNIFICANT CHANGE UP
NRBC # BLD: 0 /100 WBCS — SIGNIFICANT CHANGE UP (ref 0–0)
PLATELET # BLD AUTO: 207 K/UL — SIGNIFICANT CHANGE UP (ref 150–400)
RBC # BLD: 3.84 M/UL — LOW (ref 4.2–5.8)
RBC # FLD: 12.6 % — SIGNIFICANT CHANGE UP (ref 10.3–14.5)
WBC # BLD: 6.22 K/UL — SIGNIFICANT CHANGE UP (ref 3.8–10.5)
WBC # FLD AUTO: 6.22 K/UL — SIGNIFICANT CHANGE UP (ref 3.8–10.5)

## 2019-06-10 PROCEDURE — 71275 CT ANGIOGRAPHY CHEST: CPT | Mod: 26

## 2019-06-10 RX ORDER — HYDROMORPHONE HYDROCHLORIDE 2 MG/ML
0.5 INJECTION INTRAMUSCULAR; INTRAVENOUS; SUBCUTANEOUS ONCE
Refills: 0 | Status: DISCONTINUED | OUTPATIENT
Start: 2019-06-10 | End: 2019-06-10

## 2019-06-10 RX ORDER — APIXABAN 2.5 MG/1
10 TABLET, FILM COATED ORAL EVERY 12 HOURS
Refills: 0 | Status: DISCONTINUED | OUTPATIENT
Start: 2019-06-10 | End: 2019-06-13

## 2019-06-10 RX ORDER — APIXABAN 2.5 MG/1
2 TABLET, FILM COATED ORAL
Qty: 14 | Refills: 0
Start: 2019-06-10 | End: 2019-06-16

## 2019-06-10 RX ADMIN — TRAMADOL HYDROCHLORIDE 50 MILLIGRAM(S): 50 TABLET ORAL at 02:40

## 2019-06-10 RX ADMIN — LIDOCAINE 1 PATCH: 4 CREAM TOPICAL at 02:06

## 2019-06-10 RX ADMIN — SIMVASTATIN 20 MILLIGRAM(S): 20 TABLET, FILM COATED ORAL at 21:06

## 2019-06-10 RX ADMIN — Medication 1: at 08:27

## 2019-06-10 RX ADMIN — LATANOPROST 1 DROP(S): 0.05 SOLUTION/ DROPS OPHTHALMIC; TOPICAL at 21:05

## 2019-06-10 RX ADMIN — HYDROMORPHONE HYDROCHLORIDE 0.5 MILLIGRAM(S): 2 INJECTION INTRAMUSCULAR; INTRAVENOUS; SUBCUTANEOUS at 07:18

## 2019-06-10 RX ADMIN — HEPARIN SODIUM 1200 UNIT(S)/HR: 5000 INJECTION INTRAVENOUS; SUBCUTANEOUS at 07:20

## 2019-06-10 RX ADMIN — LIDOCAINE 1 PATCH: 4 CREAM TOPICAL at 21:08

## 2019-06-10 RX ADMIN — Medication 1: at 22:22

## 2019-06-10 RX ADMIN — APIXABAN 10 MILLIGRAM(S): 2.5 TABLET, FILM COATED ORAL at 22:22

## 2019-06-10 RX ADMIN — Medication 100 MILLIGRAM(S): at 05:25

## 2019-06-10 RX ADMIN — TAMSULOSIN HYDROCHLORIDE 0.4 MILLIGRAM(S): 0.4 CAPSULE ORAL at 21:06

## 2019-06-10 RX ADMIN — TRAMADOL HYDROCHLORIDE 50 MILLIGRAM(S): 50 TABLET ORAL at 03:17

## 2019-06-10 RX ADMIN — Medication 100 MILLIGRAM(S): at 18:07

## 2019-06-10 RX ADMIN — LIDOCAINE 1 PATCH: 4 CREAM TOPICAL at 23:22

## 2019-06-10 RX ADMIN — POLYETHYLENE GLYCOL 3350 17 GRAM(S): 17 POWDER, FOR SOLUTION ORAL at 06:57

## 2019-06-10 RX ADMIN — LIDOCAINE 1 PATCH: 4 CREAM TOPICAL at 11:56

## 2019-06-10 RX ADMIN — Medication 2: at 11:55

## 2019-06-10 RX ADMIN — HYDROMORPHONE HYDROCHLORIDE 0.5 MILLIGRAM(S): 2 INJECTION INTRAMUSCULAR; INTRAVENOUS; SUBCUTANEOUS at 06:56

## 2019-06-10 NOTE — PROGRESS NOTE ADULT - PROBLEM SELECTOR PLAN 8
Pt is full code   Next of kin is daughter Shyla [phone] IMPROVE VTE Individual Risk Assessment    RISK                                                          Points  [x] current VTE                                           3  [] Thrombophilia                                        2  [] Lower limb paralysis                              2   [] Current Cancer                                       2   [x] Immobilization > 24 hrs                        1  [] ICU/CCU stay > 24 hours                       1  [x] Age > 60                                                   1    IMPROVE VTE Score: 5  on Heparin drip for LLE DVT  no need for GI ppx.

## 2019-06-10 NOTE — PROGRESS NOTE ADULT - PROBLEM SELECTOR PLAN 4
On lasix at home  - Monitor BP closely and adjust medications if clinically indicated.  - DASH diet Cr 1.68 on admission, improving  - Unknown baseline   - C/w IVF   - F/u BMP qd

## 2019-06-10 NOTE — PROGRESS NOTE ADULT - ATTENDING COMMENTS
Patient seen and examined. Patient's history, vitals, labs, imaging studies reviewed. Discussed with above resident, agree with note with edits, educated on the diagnosis and management of above medical conditions. Plan of care discussed with patient, and agrees, all questions answered.   Maribel Trimble MD

## 2019-06-10 NOTE — PROGRESS NOTE ADULT - PROBLEM SELECTOR PLAN 1
P/w b/l LE swelling and mild pain of the leg from the knee down  - LE doppler showed nonocclusive thrombus in bilateral superficial femoral and popliteal veins  - HD stable, no SOB  - -ve jemima's sign  - D dimer of 4000  - C/w heparin drip  - Elevation of the LLE above the level of the heart  - F/u echo  - F/u hypercoagulable work-up  - F/u tumor markers  - F/u routine CT angio to r/o PE, patient already on heparin drip  - Heme consult Dr Nieto P/w b/l LE swelling and mild pain of the leg from the knee down  - LE doppler showed nonocclusive thrombus in bilateral superficial femoral and popliteal veins  - HD stable, no SOB  - -ve jemima's sign  - D dimer of 4000  - C/w heparin drip  - Elevation of the LLE above the level of the heart  - Tumor markers negative  - Will call pharmacy regarding NOAC coverage  - F/u echo  - F/u hypercoagulable work-up  - F/u routine CT angio to r/o PE, patient already on heparin drip  - Heme consult Dr Nieto P/w b/l LE swelling and mild pain of the leg from the knee down  - LE doppler showed nonocclusive thrombus in bilateral superficial femoral and popliteal veins  - HD stable, no SOB  - -ve jemima's sign  - D dimer of 4000  - C/w heparin drip  - Elevation of the LLE above the level of the heart  - Tumor markers negative  - CTA negative for PE  - Can change to eliquis on dc  - F/u echo  - F/u hypercoagulable work-up  - Heme consult Dr Nieto P/w b/l LE swelling and mild pain of the leg from the knee down  - LE doppler showed non-occlusive thrombus in bilateral superficial femoral and popliteal veins  - HD stable, no SOB  - -ve jemima's sign  - D dimer of 4000  - C/w heparin drip  - Elevation of the LLE above the level of the heart  - Tumor markers negative  - CTA negative for PE  - Can change to Eliquis on dc  - F/u echo  - F/u hypercoagulable work-up  - Heme consult Dr Nieto

## 2019-06-10 NOTE — PROGRESS NOTE ADULT - PROBLEM SELECTOR PLAN 5
On Metformin at home  - Sliding scale  - F/u A1C On lasix at home  - Monitor BP closely and adjust medications if clinically indicated.  - DASH diet

## 2019-06-10 NOTE — PROGRESS NOTE ADULT - PROBLEM SELECTOR PLAN 3
Cr 1.68 on admission, improving  - Unknown baseline   - C/w IVF   - F/u BMP qd Recently treated with levaquin ( ms)  - C/w levaquin for 4 days more to complete 7 days course  - Afebrile, no leukocytosis  - Blood cx negative

## 2019-06-10 NOTE — PROGRESS NOTE ADULT - PROBLEM SELECTOR PLAN 2
Recently treated with levaquin ( ms)  - C/w levaquin for 5 days more to complete 7 days course  - Afebrile, no leukocytosis  - F/u blood cx P/w severe low black pain  - No spinal tenderness, b/l paraspinal tenderness  - Given back pain and DVT, concern for malignancy with mets  - Tumor markers negative  - C/w tramadol + lidocaine patch  - F/u XR  - Pain management consult

## 2019-06-10 NOTE — PROGRESS NOTE ADULT - SUBJECTIVE AND OBJECTIVE BOX
PGY1 Note discussed with Supervising Resident and Primary Attending.    Patient is a 78y old  Male who presents with a chief complaint of B/L LE DVT (2019 13:01)      INTERVAL HPI/OVERNIGHT EVENTS :  No acute overnight events. Patient seen and examined at bedside. Patient has no current complaints. Patient denies nausea, vomiting, diarrhea, chest pain, SOB, headache.  MEDICATIONS  (STANDING):  docusate sodium 100 milliGRAM(s) Oral two times a day  heparin  Infusion.  Unit(s)/Hr (14 mL/Hr) IV Continuous <Continuous>  insulin lispro (HumaLOG) corrective regimen sliding scale   SubCutaneous Before meals and at bedtime  latanoprost 0.005% Ophthalmic Solution 1 Drop(s) Both EYES at bedtime  levoFLOXacin IVPB      levoFLOXacin IVPB 500 milliGRAM(s) IV Intermittent every 24 hours  lidocaine   Patch 1 Patch Transdermal daily  simvastatin 20 milliGRAM(s) Oral at bedtime  sodium chloride 0.9%. 1000 milliLiter(s) (75 mL/Hr) IV Continuous <Continuous>  tamsulosin 0.4 milliGRAM(s) Oral at bedtime    MEDICATIONS  (PRN):  heparin  Injectable 6500 Unit(s) IV Push every 6 hours PRN For aPTT less than 40  heparin  Injectable 3000 Unit(s) IV Push every 6 hours PRN For aPTT between 40 - 57  polyethylene glycol 3350 17 Gram(s) Oral daily PRN Constipation  traMADol 50 milliGRAM(s) Oral every 8 hours PRN Moderate Pain (4 - 6)      Allergies    No Known Allergies    Intolerances        REVIEW OF SYSTEMS :  * General: denies chills, fatigue  * Eyes: denies vision changes  * Respiratory: denies cough, SOB, wheezing  * Cardio: denies chest pain, palpitations  * GI: denies abd pain, nausea, vomiting, diarrhea  * : denies dysuria  * Neuro: denies headaches, numbness, weakness  * MSK: denies joint pain  * Skin: denies itching, rashes    Vital Signs Last 24 Hrs  T(C): 37.1 (10 Terry 2019 05:32), Max: 37.1 (2019 17:25)  T(F): 98.7 (10 Terry 2019 05:32), Max: 98.7 (2019 17:25)  HR: 66 (10 Terry 2019 05:32) (65 - 76)  BP: 136/73 (10 Terry 2019 05:32) (120/74 - 147/82)  BP(mean): --  RR: 18 (10 Terry 2019 05:32) (16 - 18)  SpO2: 99% (10 Terry 2019 05:32) (97% - 99%)    PHYSICAL EXAM :  * General: no acute distress, well-nourished, well-developed  * HEENT: atraumatic, normocephalic; moist mucus membranes; supple neck; no JVD  * CN: EOMI, PERRL  * Respiratory: cta b/l; no wheezes, rales, rhonchi  * Cardio: RRR; no appreciable murmurs, rubs, gallops  * Abd: soft, nontender, nondistended, +BS  * Neuro: AAOx3; strength 5/5; sensation intact throughout  * Extremities: no clubbing, cyanosis, edema  * Skin: no rashes    LABS:                          11.7   6.22  )-----------( 207      ( 10 Terry 2019 06:49 )             35.9     0609    139  |  102  |  24<H>  ----------------------------<  91  4.3   |  28  |  1.38<H>    Ca    9.2      2019 14:26    TPro  7.1  /  Alb  2.9<L>  /  TBili  0.4  /  DBili  x   /  AST  17  /  ALT  21  /  AlkPhos  60  06-09    PTT - ( 10 Terry 2019 06:49 )  PTT:93.5 sec  Urinalysis Basic - ( 2019 14:20 )    Color: Yellow / Appearance: Clear / S.020 / pH: x  Gluc: x / Ketone: Negative  / Bili: Negative / Urobili: Negative   Blood: x / Protein: 30 mg/dL / Nitrite: Negative   Leuk Esterase: Negative / RBC: 5-10 /HPF / WBC 3-5 /HPF   Sq Epi: x / Non Sq Epi: Occasional /HPF / Bacteria: Negative /HPF      CAPILLARY BLOOD GLUCOSE      POCT Blood Glucose.: 151 mg/dL (10 Terry 2019 08:14)  POCT Blood Glucose.: 133 mg/dL (2019 21:02)  POCT Blood Glucose.: 84 mg/dL (2019 16:49)  POCT Blood Glucose.: 216 mg/dL (2019 11:58)      RADIOLOGY & ADDITIONAL TESTS:   no new imaging    Imaging Personally Reviewed:    Consultant(s) Notes Reviewed: PGY1 Note discussed with Supervising Resident and Primary Attending.    Patient is a 78y old  Male who presents with a chief complaint of B/L LE DVT (2019 13:01)      INTERVAL HPI/OVERNIGHT EVENTS:  No acute overnight events. Patient seen and examined at bedside. Patient reports intermittent SOB and severe low back pain slightly better than yesterday. Patient denies nausea, vomiting, diarrhea, chest pain, dizziness, headache.    MEDICATIONS  (STANDING):  docusate sodium 100 milliGRAM(s) Oral two times a day  heparin  Infusion.  Unit(s)/Hr (14 mL/Hr) IV Continuous <Continuous>  insulin lispro (HumaLOG) corrective regimen sliding scale   SubCutaneous Before meals and at bedtime  latanoprost 0.005% Ophthalmic Solution 1 Drop(s) Both EYES at bedtime  levoFLOXacin IVPB      levoFLOXacin IVPB 500 milliGRAM(s) IV Intermittent every 24 hours  lidocaine   Patch 1 Patch Transdermal daily  simvastatin 20 milliGRAM(s) Oral at bedtime  sodium chloride 0.9%. 1000 milliLiter(s) (75 mL/Hr) IV Continuous <Continuous>  tamsulosin 0.4 milliGRAM(s) Oral at bedtime    MEDICATIONS  (PRN):  heparin  Injectable 6500 Unit(s) IV Push every 6 hours PRN For aPTT less than 40  heparin  Injectable 3000 Unit(s) IV Push every 6 hours PRN For aPTT between 40 - 57  polyethylene glycol 3350 17 Gram(s) Oral daily PRN Constipation  traMADol 50 milliGRAM(s) Oral every 8 hours PRN Moderate Pain (4 - 6)      Allergies    No Known Allergies    Intolerances        REVIEW OF SYSTEMS:  * General: denies chills, fatigue  * Eyes: denies vision changes  * Respiratory: reports cough, intermittent SOB; denies wheezing  * Cardio: denies chest pain, palpitations  * GI: denies abd pain, nausea, vomiting, diarrhea  * : denies dysuria  * Neuro: denies headaches, numbness, weakness  * MSK: denies joint pain; reports low back pain  * Skin: denies itching, rashes    Vital Signs Last 24 Hrs  T(C): 37.1 (10 Terry 2019 05:32), Max: 37.1 (2019 17:25)  T(F): 98.7 (10 Terry 2019 05:32), Max: 98.7 (2019 17:25)  HR: 66 (10 Terry 2019 05:32) (65 - 76)  BP: 136/73 (10 Terry 2019 05:32) (120/74 - 147/82)  BP(mean): --  RR: 18 (10 Terry 2019 05:32) (16 - 18)  SpO2: 99% (10 Terry 2019 05:32) (97% - 99%)    PHYSICAL EXAM:  * General: no acute distress, well-nourished, well-developed  * HEENT: atraumatic, normocephalic; moist mucus membranes; supple neck; no JVD  * CN: EOMI, PERRL  * Respiratory: cta b/l; no wheezes, rales, rhonchi appreciable but patient moaning on exam  * Cardio: RRR; no appreciable murmurs, rubs, gallops  * Abd: soft, nontender, nondistended, +BS  * Neuro: AAOx3; strength 5/5; sensation intact throughout  * Extremities: no clubbing, cyanosis, edema  * Skin: no rashes    LABS:                          11.7   6.22  )-----------( 207      ( 10 Terry 2019 06:49 )             35.9     06-09    139  |  102  |  24<H>  ----------------------------<  91  4.3   |  28  |  1.38<H>    Ca    9.2      2019 14:26    TPro  7.1  /  Alb  2.9<L>  /  TBili  0.4  /  DBili  x   /  AST  17  /  ALT  21  /  AlkPhos  60  06-09    PTT - ( 10 Terry 2019 06:49 )  PTT:93.5 sec  Urinalysis Basic - ( 2019 14:20 )    Color: Yellow / Appearance: Clear / S.020 / pH: x  Gluc: x / Ketone: Negative  / Bili: Negative / Urobili: Negative   Blood: x / Protein: 30 mg/dL / Nitrite: Negative   Leuk Esterase: Negative / RBC: 5-10 /HPF / WBC 3-5 /HPF   Sq Epi: x / Non Sq Epi: Occasional /HPF / Bacteria: Negative /HPF      CAPILLARY BLOOD GLUCOSE      POCT Blood Glucose.: 151 mg/dL (10 Terry 2019 08:14)  POCT Blood Glucose.: 133 mg/dL (2019 21:02)  POCT Blood Glucose.: 84 mg/dL (2019 16:49)  POCT Blood Glucose.: 216 mg/dL (2019 11:58)      RADIOLOGY & ADDITIONAL TESTS:   Consultant(s) Notes Reviewed: yes PGY1 Note discussed with Supervising Resident and Primary Attending.    Patient is a 78y old  Male who presents with a chief complaint of B/L LE DVT (2019 13:01)      INTERVAL HPI/OVERNIGHT EVENTS:  No acute overnight events. Patient seen and examined at bedside. Patient reports intermittent SOB and severe low back pain slightly better than yesterday. Also reporting constipation, will give enema today. Patient denies nausea, vomiting, diarrhea, chest pain, dizziness, headache.    MEDICATIONS  (STANDING):  docusate sodium 100 milliGRAM(s) Oral two times a day  heparin  Infusion.  Unit(s)/Hr (14 mL/Hr) IV Continuous <Continuous>  insulin lispro (HumaLOG) corrective regimen sliding scale   SubCutaneous Before meals and at bedtime  latanoprost 0.005% Ophthalmic Solution 1 Drop(s) Both EYES at bedtime  levoFLOXacin IVPB      levoFLOXacin IVPB 500 milliGRAM(s) IV Intermittent every 24 hours  lidocaine   Patch 1 Patch Transdermal daily  simvastatin 20 milliGRAM(s) Oral at bedtime  sodium chloride 0.9%. 1000 milliLiter(s) (75 mL/Hr) IV Continuous <Continuous>  tamsulosin 0.4 milliGRAM(s) Oral at bedtime    MEDICATIONS  (PRN):  heparin  Injectable 6500 Unit(s) IV Push every 6 hours PRN For aPTT less than 40  heparin  Injectable 3000 Unit(s) IV Push every 6 hours PRN For aPTT between 40 - 57  polyethylene glycol 3350 17 Gram(s) Oral daily PRN Constipation  traMADol 50 milliGRAM(s) Oral every 8 hours PRN Moderate Pain (4 - 6)      Allergies    No Known Allergies    Intolerances        REVIEW OF SYSTEMS:  * General: denies chills, fatigue  * Eyes: denies vision changes  * Respiratory: reports cough, intermittent SOB; denies wheezing  * Cardio: denies chest pain, palpitations  * GI: reports constipation; denies abd pain, nausea, vomiting, diarrhea  * : denies dysuria  * Neuro: denies headaches, numbness, weakness  * MSK: denies joint pain; reports low back pain  * Skin: denies itching, rashes    Vital Signs Last 24 Hrs  T(C): 37.1 (10 Terry 2019 05:32), Max: 37.1 (2019 17:25)  T(F): 98.7 (10 Terry 2019 05:32), Max: 98.7 (2019 17:25)  HR: 66 (10 Terry 2019 05:32) (65 - 76)  BP: 136/73 (10 Terry 2019 05:32) (120/74 - 147/82)  BP(mean): --  RR: 18 (10 Terry 2019 05:32) (16 - 18)  SpO2: 99% (10 Terry 2019 05:32) (97% - 99%)    PHYSICAL EXAM:  * General: no acute distress, well-nourished, well-developed  * HEENT: atraumatic, normocephalic; moist mucus membranes; supple neck; no JVD  * CN: EOMI, PERRL  * Respiratory: cta b/l; no wheezes, rales, rhonchi appreciable but patient moaning on exam  * Cardio: RRR; no appreciable murmurs, rubs, gallops  * Abd: soft, nontender, nondistended, +BS  * Neuro: AAOx3; strength 5/5; sensation intact throughout  * Extremities: no clubbing, cyanosis, edema  * Skin: no rashes    LABS:                          11.7   6.22  )-----------( 207      ( 10 Terry 2019 06:49 )             35.9     06-09    139  |  102  |  24<H>  ----------------------------<  91  4.3   |  28  |  1.38<H>    Ca    9.2      2019 14:26    TPro  7.1  /  Alb  2.9<L>  /  TBili  0.4  /  DBili  x   /  AST  17  /  ALT  21  /  AlkPhos  60  -09    PTT - ( 10 Terry 2019 06:49 )  PTT:93.5 sec  Urinalysis Basic - ( 2019 14:20 )    Color: Yellow / Appearance: Clear / S.020 / pH: x  Gluc: x / Ketone: Negative  / Bili: Negative / Urobili: Negative   Blood: x / Protein: 30 mg/dL / Nitrite: Negative   Leuk Esterase: Negative / RBC: 5-10 /HPF / WBC 3-5 /HPF   Sq Epi: x / Non Sq Epi: Occasional /HPF / Bacteria: Negative /HPF      CAPILLARY BLOOD GLUCOSE      POCT Blood Glucose.: 151 mg/dL (10 Terry 2019 08:14)  POCT Blood Glucose.: 133 mg/dL (2019 21:02)  POCT Blood Glucose.: 84 mg/dL (2019 16:49)  POCT Blood Glucose.: 216 mg/dL (2019 11:58)      RADIOLOGY & ADDITIONAL TESTS:   Consultant(s) Notes Reviewed: yes PGY1 Note discussed with Supervising Resident and Primary Attending.    Patient is a 78 year old male who presents with a chief complaint of B/L LE DVT (2019 13:01)      INTERVAL HPI/OVERNIGHT EVENTS: No acute overnight events. Patient seen and examined at bedside. Patient reports intermittent SOB and severe low back pain slightly better than yesterday. Also reporting constipation, will give enema today. Patient denies nausea, vomiting, diarrhea, chest pain, dizziness, headache.    MEDICATIONS  (STANDING):  docusate sodium 100 milliGRAM(s) Oral two times a day  heparin  Infusion.  Unit(s)/Hr (14 mL/Hr) IV Continuous <Continuous>  insulin lispro (HumaLOG) corrective regimen sliding scale   SubCutaneous Before meals and at bedtime  latanoprost 0.005% Ophthalmic Solution 1 Drop(s) Both EYES at bedtime  levoFLOXacin IVPB      levoFLOXacin IVPB 500 milliGRAM(s) IV Intermittent every 24 hours  lidocaine   Patch 1 Patch Transdermal daily  simvastatin 20 milliGRAM(s) Oral at bedtime  sodium chloride 0.9%. 1000 milliLiter(s) (75 mL/Hr) IV Continuous <Continuous>  tamsulosin 0.4 milliGRAM(s) Oral at bedtime    MEDICATIONS  (PRN):  heparin  Injectable 6500 Unit(s) IV Push every 6 hours PRN For aPTT less than 40  heparin  Injectable 3000 Unit(s) IV Push every 6 hours PRN For aPTT between 40 - 57  polyethylene glycol 3350 17 Gram(s) Oral daily PRN Constipation  traMADol 50 milliGRAM(s) Oral every 8 hours PRN Moderate Pain (4 - 6)      Allergies    No Known Allergies          REVIEW OF SYSTEMS:  * General: denies chills, fatigue  * Eyes: denies vision changes  * Respiratory: reports cough, intermittent SOB; denies wheezing  * Cardio: denies chest pain, palpitations  * GI: reports constipation; denies abd pain, nausea, vomiting, diarrhea  * : denies dysuria  * Neuro: denies headaches, numbness, weakness  * MSK: denies joint pain; reports low back pain  * Skin: denies itching, rashes    Vital Signs Last 24 Hrs  T(C): 37.1 (10 Terry 2019 05:32), Max: 37.1 (2019 17:25)  T(F): 98.7 (10 Terry 2019 05:32), Max: 98.7 (2019 17:25)  HR: 66 (10 Terry 2019 05:32) (65 - 76)  BP: 136/73 (10 Terry 2019 05:32) (120/74 - 147/82)  RR: 18 (10 Terry 2019 05:32) (16 - 18)  SpO2: 99% (10 Terry 2019 05:32) (97% - 99%)    PHYSICAL EXAM:  * General: no acute distress, well-nourished, well-developed  * HEENT: atraumatic, normocephalic; moist mucus membranes; supple neck; no JVD  * CN: EOMI, PERRL  * Respiratory: cta b/l; no wheezes, rales, rhonchi appreciable but patient moaning on exam  * Cardio: RRR; no appreciable murmurs, rubs, gallops  * Abd: soft, nontender, nondistended, +BS  * Neuro: AAOx3; strength 5/5; sensation intact throughout  * Extremities: no clubbing, cyanosis, edema  * Skin: no rashes  *Psych: calm    LABS:                          11.7   6.22  )-----------( 207      ( 10 Terry 2019 06:49 )             35.9     06-09    139  |  102  |  24<H>  ----------------------------<  91  4.3   |  28  |  1.38<H>    Ca    9.2      2019 14:26    TPro  7.1  /  Alb  2.9<L>  /  TBili  0.4  /  DBili  x   /  AST  17  /  ALT  21  /  AlkPhos  60  -09    PTT - ( 10 Terry 2019 06:49 )  PTT:93.5 sec  Urinalysis Basic - ( 2019 14:20 )    Color: Yellow / Appearance: Clear / S.020 / pH: x  Gluc: x / Ketone: Negative  / Bili: Negative / Urobili: Negative   Blood: x / Protein: 30 mg/dL / Nitrite: Negative   Leuk Esterase: Negative / RBC: 5-10 /HPF / WBC 3-5 /HPF   Sq Epi: x / Non Sq Epi: Occasional /HPF / Bacteria: Negative /HPF      CAPILLARY BLOOD GLUCOSE      POCT Blood Glucose.: 151 mg/dL (10 Terry 2019 08:14)  POCT Blood Glucose.: 133 mg/dL (2019 21:02)  POCT Blood Glucose.: 84 mg/dL (2019 16:49)  POCT Blood Glucose.: 216 mg/dL (2019 11:58)      RADIOLOGY & ADDITIONAL TESTS:   Consultant(s) Notes Reviewed: yes

## 2019-06-10 NOTE — PROGRESS NOTE ADULT - SUBJECTIVE AND OBJECTIVE BOX
HPI:  79 y/o M patient with a significant PMHx of Asthma, DM, HTN, Hypercholesterolemia, Glaucoma and PSHx of craniotomy for traumatic ICH (2008) presents to the ED with sob and sharp back pain . Patient reports he has been having worsening chronic back pain left greater than right for years, getting gradually worse for the past 3 months. Patient adds his back pain radiates to the b/l knees. Patient says he takes Tramadol for his back pain to no relief. Patient also adds he has been experiencing a cough for x 2 weeks and shortness of breath for the past week. Patient says he went to Dr. Puckett where he was prescribed Levaquin and methylprednisolone. Patient says he also uses Advair BID with no relief. Patient describes his phlegm as blood streaked and sometimes brown in color. Cough has improved with medication but SOB unchanged. Worse with exertion. PExam revealed B/L leg swelling and mild pain of the leg from the knee down . LE Doppler showed : Nonocclusive thrombus in bilateral superficial femoral and popliteal veins .  Pt denies numbness and tingling of the L leg, Denies fevers and chills. No pain when walking. Denies sedentary lifestyle, denies recent travel. Denies any deviation from her normal everyday activities. Denies family history of blood clots in the legs or lungs Patient denies headache , chest pain, fever, incontinence, focal weakness, paresthesias, or any other complaints. NKDA.    EKG : NSR   UA : -ve (08 Jun 2019 23:58)     Pt is seen and examined  pt is awake and lying in bed/out of bed to chair  pt seems comfortable and denies any complaints at this time    ROS:  Negative except for:    MEDICATIONS  (STANDING):  docusate sodium 100 milliGRAM(s) Oral two times a day  heparin  Infusion.  Unit(s)/Hr (14 mL/Hr) IV Continuous <Continuous>  insulin lispro (HumaLOG) corrective regimen sliding scale   SubCutaneous Before meals and at bedtime  latanoprost 0.005% Ophthalmic Solution 1 Drop(s) Both EYES at bedtime  levoFLOXacin IVPB      levoFLOXacin IVPB 500 milliGRAM(s) IV Intermittent every 24 hours  lidocaine   Patch 1 Patch Transdermal daily  simvastatin 20 milliGRAM(s) Oral at bedtime  sodium chloride 0.9%. 1000 milliLiter(s) (75 mL/Hr) IV Continuous <Continuous>  tamsulosin 0.4 milliGRAM(s) Oral at bedtime    MEDICATIONS  (PRN):  heparin  Injectable 6500 Unit(s) IV Push every 6 hours PRN For aPTT less than 40  heparin  Injectable 3000 Unit(s) IV Push every 6 hours PRN For aPTT between 40 - 57  polyethylene glycol 3350 17 Gram(s) Oral daily PRN Constipation  traMADol 50 milliGRAM(s) Oral every 8 hours PRN Moderate Pain (4 - 6)      Allergies    No Known Allergies    Intolerances        Vital Signs Last 24 Hrs  T(C): 37.1 (10 Terry 2019 17:43), Max: 37.1 (10 Terry 2019 03:30)  T(F): 98.7 (10 Terry 2019 17:43), Max: 98.7 (10 Terry 2019 03:30)  HR: 80 (10 Terry 2019 17:43) (65 - 86)  BP: 111/62 (10 Terry 2019 17:43) (111/62 - 136/73)  BP(mean): --  RR: 16 (10 Terry 2019 17:43) (16 - 18)  SpO2: 98% (10 Terry 2019 17:43) (97% - 99%)    PHYSICAL EXAM  General: adult in NAD  HEENT: clear oropharynx, anicteric sclera, pink conjunctiva  Neck: supple  CV: normal S1/S2 with no murmur rubs or gallops  Lungs: positive air movement b/l ant lungs,clear to auscultation, no wheezes, no rales  Abdomen: soft non-tender non-distended, no hepatosplenomegaly  Ext: no clubbing cyanosis or edema  Skin: no rashes and no petechiae  Neuro: alert and oriented X 4, no focal deficits  LABS:                          11.7   6.22  )-----------( 207      ( 10 Terry 2019 06:49 )             35.9         Mean Cell Volume : 93.5 fl  Mean Cell Hemoglobin : 30.5 pg  Mean Cell Hemoglobin Concentration : 32.6 gm/dL  Auto Neutrophil # : x  Auto Lymphocyte # : x  Auto Monocyte # : x  Auto Eosinophil # : x  Auto Basophil # : x  Auto Neutrophil % : x  Auto Lymphocyte % : x  Auto Monocyte % : x  Auto Eosinophil % : x  Auto Basophil % : x    Serial CBC  Hematocrit 35.9  Hemoglobin 11.7  Plat 207  RBC 3.84  WBC 6.22  Serial CBC  Hematocrit 34.4  Hemoglobin 11.3  Plat 172  RBC 3.69  WBC 6.80  Serial CBC  Hematocrit 38.5  Hemoglobin 12.4  Plat 186  RBC 4.08  WBC 6.95    06-09    139  |  102  |  24<H>  ----------------------------<  91  4.3   |  28  |  1.38<H>    Ca    9.2      09 Jun 2019 14:26    TPro  7.1  /  Alb  2.9<L>  /  TBili  0.4  /  DBili  x   /  AST  17  /  ALT  21  /  AlkPhos  60  06-09      PTT - ( 10 Terry 2019 06:49 )  PTT:93.5 sec              BLOOD SMEAR INTERPRETATION:       RADIOLOGY & ADDITIONAL STUDIES:

## 2019-06-10 NOTE — PROGRESS NOTE ADULT - PROBLEM SELECTOR PLAN 7
IMPROVE VTE Individual Risk Assessment    RISK                                                          Points  [x] current VTE                                           3  [] Thrombophilia                                        2  [] Lower limb paralysis                              2   [] Current Cancer                                       2   [x] Immobilization > 24 hrs                        1  [] ICU/CCU stay > 24 hours                       1  [x] Age > 60                                                   1    IMPROVE VTE Score: 5  on Heparin drip for LLE DVT  no need for GI ppx. C/w Simvastatin 20 mg   - F/u lipid profile

## 2019-06-11 ENCOUNTER — TRANSCRIPTION ENCOUNTER (OUTPATIENT)
Age: 79
End: 2019-06-11

## 2019-06-11 DIAGNOSIS — M47.9 SPONDYLOSIS, UNSPECIFIED: ICD-10-CM

## 2019-06-11 DIAGNOSIS — R91.8 OTHER NONSPECIFIC ABNORMAL FINDING OF LUNG FIELD: ICD-10-CM

## 2019-06-11 DIAGNOSIS — M54.42 LUMBAGO WITH SCIATICA, LEFT SIDE: ICD-10-CM

## 2019-06-11 DIAGNOSIS — K59.00 CONSTIPATION, UNSPECIFIED: ICD-10-CM

## 2019-06-11 LAB
ANION GAP SERPL CALC-SCNC: 7 MMOL/L — SIGNIFICANT CHANGE UP (ref 5–17)
APTT BLD: 36.5 SEC — HIGH (ref 27.5–36.3)
B2 GLYCOPROT1 AB SER QL: NEGATIVE — SIGNIFICANT CHANGE UP
BASOPHILS # BLD AUTO: 0.01 K/UL — SIGNIFICANT CHANGE UP (ref 0–0.2)
BASOPHILS NFR BLD AUTO: 0.2 % — SIGNIFICANT CHANGE UP (ref 0–2)
BUN SERPL-MCNC: 21 MG/DL — HIGH (ref 7–18)
CALCIUM SERPL-MCNC: 9.4 MG/DL — SIGNIFICANT CHANGE UP (ref 8.4–10.5)
CARDIOLIPIN AB SER-ACNC: NEGATIVE — SIGNIFICANT CHANGE UP
CHLORIDE SERPL-SCNC: 102 MMOL/L — SIGNIFICANT CHANGE UP (ref 96–108)
CO2 SERPL-SCNC: 27 MMOL/L — SIGNIFICANT CHANGE UP (ref 22–31)
CREAT SERPL-MCNC: 1.42 MG/DL — HIGH (ref 0.5–1.3)
EOSINOPHIL # BLD AUTO: 0.21 K/UL — SIGNIFICANT CHANGE UP (ref 0–0.5)
EOSINOPHIL NFR BLD AUTO: 3.5 % — SIGNIFICANT CHANGE UP (ref 0–6)
GLUCOSE BLDC GLUCOMTR-MCNC: 117 MG/DL — HIGH (ref 70–99)
GLUCOSE BLDC GLUCOMTR-MCNC: 142 MG/DL — HIGH (ref 70–99)
GLUCOSE BLDC GLUCOMTR-MCNC: 150 MG/DL — HIGH (ref 70–99)
GLUCOSE BLDC GLUCOMTR-MCNC: 207 MG/DL — HIGH (ref 70–99)
GLUCOSE SERPL-MCNC: 134 MG/DL — HIGH (ref 70–99)
HCT VFR BLD CALC: 37.7 % — LOW (ref 39–50)
HGB BLD-MCNC: 12.5 G/DL — LOW (ref 13–17)
IMM GRANULOCYTES NFR BLD AUTO: 0.3 % — SIGNIFICANT CHANGE UP (ref 0–1.5)
LYMPHOCYTES # BLD AUTO: 1.62 K/UL — SIGNIFICANT CHANGE UP (ref 1–3.3)
LYMPHOCYTES # BLD AUTO: 27 % — SIGNIFICANT CHANGE UP (ref 13–44)
MCHC RBC-ENTMCNC: 30.9 PG — SIGNIFICANT CHANGE UP (ref 27–34)
MCHC RBC-ENTMCNC: 33.2 GM/DL — SIGNIFICANT CHANGE UP (ref 32–36)
MCV RBC AUTO: 93.1 FL — SIGNIFICANT CHANGE UP (ref 80–100)
MONOCYTES # BLD AUTO: 0.56 K/UL — SIGNIFICANT CHANGE UP (ref 0–0.9)
MONOCYTES NFR BLD AUTO: 9.3 % — SIGNIFICANT CHANGE UP (ref 2–14)
NEUTROPHILS # BLD AUTO: 3.59 K/UL — SIGNIFICANT CHANGE UP (ref 1.8–7.4)
NEUTROPHILS NFR BLD AUTO: 59.7 % — SIGNIFICANT CHANGE UP (ref 43–77)
NRBC # BLD: 0 /100 WBCS — SIGNIFICANT CHANGE UP (ref 0–0)
PLATELET # BLD AUTO: 218 K/UL — SIGNIFICANT CHANGE UP (ref 150–400)
POTASSIUM SERPL-MCNC: 4.3 MMOL/L — SIGNIFICANT CHANGE UP (ref 3.5–5.3)
POTASSIUM SERPL-SCNC: 4.3 MMOL/L — SIGNIFICANT CHANGE UP (ref 3.5–5.3)
RBC # BLD: 4.05 M/UL — LOW (ref 4.2–5.8)
RBC # FLD: 12.3 % — SIGNIFICANT CHANGE UP (ref 10.3–14.5)
SODIUM SERPL-SCNC: 136 MMOL/L — SIGNIFICANT CHANGE UP (ref 135–145)
WBC # BLD: 6.01 K/UL — SIGNIFICANT CHANGE UP (ref 3.8–10.5)
WBC # FLD AUTO: 6.01 K/UL — SIGNIFICANT CHANGE UP (ref 3.8–10.5)

## 2019-06-11 PROCEDURE — 99222 1ST HOSP IP/OBS MODERATE 55: CPT

## 2019-06-11 PROCEDURE — 74177 CT ABD & PELVIS W/CONTRAST: CPT | Mod: 26

## 2019-06-11 PROCEDURE — 72148 MRI LUMBAR SPINE W/O DYE: CPT | Mod: 26

## 2019-06-11 RX ORDER — GABAPENTIN 400 MG/1
300 CAPSULE ORAL EVERY 12 HOURS
Refills: 0 | Status: DISCONTINUED | OUTPATIENT
Start: 2019-06-11 | End: 2019-06-12

## 2019-06-11 RX ORDER — CYCLOBENZAPRINE HYDROCHLORIDE 10 MG/1
10 TABLET, FILM COATED ORAL THREE TIMES A DAY
Refills: 0 | Status: DISCONTINUED | OUTPATIENT
Start: 2019-06-11 | End: 2019-06-12

## 2019-06-11 RX ORDER — POLYETHYLENE GLYCOL 3350 17 G/17G
17 POWDER, FOR SOLUTION ORAL DAILY
Refills: 0 | Status: DISCONTINUED | OUTPATIENT
Start: 2019-06-11 | End: 2019-06-13

## 2019-06-11 RX ORDER — KETOROLAC TROMETHAMINE 30 MG/ML
30 SYRINGE (ML) INJECTION ONCE
Refills: 0 | Status: DISCONTINUED | OUTPATIENT
Start: 2019-06-11 | End: 2019-06-11

## 2019-06-11 RX ADMIN — Medication 30 MILLIGRAM(S): at 10:15

## 2019-06-11 RX ADMIN — APIXABAN 10 MILLIGRAM(S): 2.5 TABLET, FILM COATED ORAL at 17:09

## 2019-06-11 RX ADMIN — Medication 100 MILLIGRAM(S): at 17:08

## 2019-06-11 RX ADMIN — TAMSULOSIN HYDROCHLORIDE 0.4 MILLIGRAM(S): 0.4 CAPSULE ORAL at 21:55

## 2019-06-11 RX ADMIN — POLYETHYLENE GLYCOL 3350 17 GRAM(S): 17 POWDER, FOR SOLUTION ORAL at 17:09

## 2019-06-11 RX ADMIN — Medication 30 MILLIGRAM(S): at 09:59

## 2019-06-11 RX ADMIN — SIMVASTATIN 20 MILLIGRAM(S): 20 TABLET, FILM COATED ORAL at 21:55

## 2019-06-11 RX ADMIN — Medication 2: at 21:53

## 2019-06-11 RX ADMIN — GABAPENTIN 300 MILLIGRAM(S): 400 CAPSULE ORAL at 17:09

## 2019-06-11 RX ADMIN — LATANOPROST 1 DROP(S): 0.05 SOLUTION/ DROPS OPHTHALMIC; TOPICAL at 21:55

## 2019-06-11 RX ADMIN — APIXABAN 10 MILLIGRAM(S): 2.5 TABLET, FILM COATED ORAL at 05:21

## 2019-06-11 RX ADMIN — Medication 100 MILLIGRAM(S): at 05:21

## 2019-06-11 NOTE — CONSULT NOTE ADULT - SUBJECTIVE AND OBJECTIVE BOX
Pt Name: ESTUARDO LARIOS    MRN: [id number]      ORTHOPEDIC SPINE CONSULT:    Patient is a 78y     HEALTH ISSUES - PROBLEM Dx:  Acute constipation  Acute midline low back pain with bilateral sciatica  Pulmonary nodules  Spondylosis  Pleural nodule  Low back pain  Dyspnea  Femoropopliteal thrombophlebitis  Hypercholesterolemia  Diabetes  HTN (hypertension)  CARINA (acute kidney injury)  Pneumonia  Acute deep vein thrombosis (DVT) of femoral vein of both lower extremities      PAST MEDICAL & SURGICAL HISTORY:  HTN (hypertension)  Asthma  Glaucoma  Hypercholesterolemia  Diabetes  H/O craniotomy      Allergies: No Known Allergies      Medications: apixaban 10 milliGRAM(s) Oral every 12 hours  cyclobenzaprine 10 milliGRAM(s) Oral three times a day PRN  docusate sodium 100 milliGRAM(s) Oral two times a day  gabapentin 300 milliGRAM(s) Oral every 12 hours  insulin lispro (HumaLOG) corrective regimen sliding scale   SubCutaneous Before meals and at bedtime  latanoprost 0.005% Ophthalmic Solution 1 Drop(s) Both EYES at bedtime  levoFLOXacin IVPB      levoFLOXacin IVPB 500 milliGRAM(s) IV Intermittent every 24 hours  lidocaine   Patch 1 Patch Transdermal daily  polyethylene glycol 3350 17 Gram(s) Oral daily  simvastatin 20 milliGRAM(s) Oral at bedtime  tamsulosin 0.4 milliGRAM(s) Oral at bedtime  traMADol 50 milliGRAM(s) Oral every 8 hours PRN      FAMILY HISTORY:  No pertinent family history in first degree relatives  : non-contributory      PHYSICAL EXAM:    Vital Signs Last 24 Hrs  T(C): 36.1 (2019 15:14), Max: 37.1 (10 Terry 2019 17:43)  T(F): 97 (2019 15:14), Max: 98.7 (10 Terry 2019 17:43)  HR: 82 (2019 15:14) (72 - 84)  BP: 116/66 (2019 15:14) (111/62 - 145/78)  BP(mean): --  RR: 15 (2019 15:14) (15 - 16)  SpO2: 100% (2019 15:14) (98% - 100%)  Daily     Daily Weight in k.1 (2019 05:33)    Appearance: Alert, responsive, in no acute distress, laying comfortably in bed      Musculoskeletal:         Left Upper Extremity: Atraumatic with normal alignment NROM. No bony tenderness.        Right Upper Extremity: Atraumatic with normal alignment NROM. No bony tenderness.        Left Lower Extremity: Atraumatic with normal alignment NROM. No bony tenderness. No calf tenderness, Calf soft.       Right Lower Extremity: Atraumatic with normal alignment NROM. No bony tenderness.  No calf tenderness, Calf soft.    Neurological: Sensation is grossly intact to light touch. No focal deficits or weaknesses found.      Motor exam:          Spine: Skin is pink warm,            Upper extremities: 5/5 strength of the upper extremities      Labs:                        12.5   6.01  )-----------( 218      ( 2019 06:03 )             37.7     -    136  |  102  |  21<H>  ----------------------------<  134<H>  4.3   |  27  |  1.42<H>    Ca    9.4      2019 06:03    Imaging Studies: MRI of L Spine:Severe spondylosis related to scoliosis with multilevel ankylosis.   Intradiscal and endplate signal change at L2-L3, most likely   degenerative. Although discitis could be considered in the appropriate   clinical setting.    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      IMPRESSION: 79 y/o M w/ Lumbar DDD    PLAN:  -  Pain management  -  DVT prophylaxis  -  Daily Physical Therapy- WBAT of the lower extremities  -  Case d/w Dr. Bell  -  Conservative treatment Pt Name: ESTUARDO LAIROS    MRN: [id number]      ORTHOPEDIC SPINE CONSULT:    79 y/o M p/w a c/o acute on chronic lower back pain x several days. Pt initially admitted for LB pain with cough 3 days ago- pt recently was seen by his PMD and diagnosed with PNA- given Levaquin and Methylprednisolone. Pt states the LB pain is chronic although has been worsening in recent months- denies any hx of Lumbar spine surgery or injections.(Has hx of cervical diskectomy >20 years ago although denies any neck pain). Pt states the lower back pain radiates to the B/L thigh and knee intermittently(L>R). Pt takes tramadol at home for the LB with minimal relief. Denies any rudy numbness/tingling. Ambulates independently at home. Pt states the LB pain is worsened with ambulation and movement. Denies any fever/chills. Pt states he has improved the past few days with pain control and has ambulated with a cane. Denies any urine/bowel incontinence. Denies any weakness.     In ER: PExam revealed B/L leg swelling and mild pain of the leg from the knee down . LE Doppler showed : Nonocclusive thrombus in bilateral superficial femoral and popliteal veins .  Pt denies numbness and tingling of the L leg, Denies fevers and chills. No pain when walking. Denies sedentary lifestyle, denies recent travel. Denies any deviation from her normal everyday activities. Denies family history of blood clots in the legs or lungs Patient denies headache , chest pain, fever, incontinence, focal weakness, paresthesias, or any other complaints. NKDA.      HEALTH ISSUES - PROBLEM Dx:  Acute constipation  Acute midline low back pain with bilateral sciatica  Pulmonary nodules  Spondylosis  Pleural nodule  Low back pain  Dyspnea  Femoropopliteal thrombophlebitis  Hypercholesterolemia  Diabetes  HTN (hypertension)  CARINA (acute kidney injury)  Pneumonia  Acute deep vein thrombosis (DVT) of femoral vein of both lower extremities      PAST MEDICAL & SURGICAL HISTORY:  HTN (hypertension)  Asthma  Glaucoma  Hypercholesterolemia  Diabetes  H/O craniotomy      Allergies: No Known Allergies      Medications: apixaban 10 milliGRAM(s) Oral every 12 hours  cyclobenzaprine 10 milliGRAM(s) Oral three times a day PRN  docusate sodium 100 milliGRAM(s) Oral two times a day  gabapentin 300 milliGRAM(s) Oral every 12 hours  insulin lispro (HumaLOG) corrective regimen sliding scale   SubCutaneous Before meals and at bedtime  latanoprost 0.005% Ophthalmic Solution 1 Drop(s) Both EYES at bedtime  levoFLOXacin IVPB      levoFLOXacin IVPB 500 milliGRAM(s) IV Intermittent every 24 hours  lidocaine   Patch 1 Patch Transdermal daily  polyethylene glycol 3350 17 Gram(s) Oral daily  simvastatin 20 milliGRAM(s) Oral at bedtime  tamsulosin 0.4 milliGRAM(s) Oral at bedtime  traMADol 50 milliGRAM(s) Oral every 8 hours PRN      FAMILY HISTORY:  No pertinent family history in first degree relatives  : non-contributory      PHYSICAL EXAM:    Vital Signs Last 24 Hrs  T(C): 36.1 (2019 15:14), Max: 37.1 (10 Terry 2019 17:43)  T(F): 97 (2019 15:14), Max: 98.7 (10 Terry 2019 17:43)  HR: 82 (2019 15:14) (72 - 84)  BP: 116/66 (2019 15:14) (111/62 - 145/78)  BP(mean): --  RR: 15 (2019 15:14) (15 - 16)  SpO2: 100% (2019 15:14) (98% - 100%)  Daily     Daily Weight in k.1 (2019 05:33)    Appearance: Alert, responsive, in no acute distress, laying comfortably in bed    Back: No erythema/ecchymosis, No swelling, Skin intact, (+) TTP over lumbar region, No TTP to paraspinal muscles.   Musculoskeletal:         Left Upper Extremity: Atraumatic with normal alignment NROM. No bony tenderness. 5/5 strength       Right Upper Extremity: Atraumatic with normal alignment NROM. No bony tenderness. 5/5 strength       Left Lower Extremity: Atraumatic with normal alignment NROM. No bony tenderness. No calf tenderness, Calf soft/Mild tenderness. 5/5 strength. Able to straight leg raise, (+) Straight leg raise test.        Right Lower Extremity: Atraumatic with normal alignment NROM. No bony tenderness.  No calf tenderness, Calf soft/ Mild tenderness. 5/5 strength. Able to straight leg raise, (+) Straight leg raise test.     Neurological: Sensation is grossly intact to light touch to B/L LE. No focal deficits or weaknesses found.      Labs:                        12.5   6.01  )-----------( 218      ( 2019 06:03 )             37.7     06-11    136  |  102  |  21<H>  ----------------------------<  134<H>  4.3   |  27  |  1.42<H>    Ca    9.4      2019 06:03    Imaging Studies: MRI of L Spine:Severe spondylosis related to scoliosis with multilevel ankylosis.   Intradiscal and endplate signal change at L2-L3, most likely   degenerative. Although discitis could be considered in the appropriate   clinical setting.    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      IMPRESSION: 79 y/o M w/ Lumbar Degenerative Disk Disease    PLAN:  -  D/w - Recommends Conservative management- low suspicion for infection due to pt being afebrile, he is improving, No white count and findings on MRI likely due to Degenerative disk disease.   -  Pain management  -  C/w Medical care  -  C/w Neurology care  -  DVT prophylaxis  -  Daily Physical Therapy- WBAT of the lower extremities

## 2019-06-11 NOTE — PROGRESS NOTE ADULT - PROBLEM SELECTOR PLAN 3
Recently treated with levaquin ( ms)  - C/w levaquin for 4 days more to complete 7 days course  - Afebrile, no leukocytosis  - Blood cx negative Recently treated with levaquin ( ms)  - C/w levaquin for 3 days more to complete 7 days course  - Afebrile, no leukocytosis  - Blood cx negative

## 2019-06-11 NOTE — DISCHARGE NOTE PROVIDER - NSDCCPCAREPLAN_GEN_ALL_CORE_FT
PRINCIPAL DISCHARGE DIAGNOSIS  Diagnosis: Acute deep vein thrombosis (DVT) of femoral vein of both lower extremities  Assessment and Plan of Treatment: You presented with back and leg pain. You were found to have DVT of the bilateral lower extremities. The CT scan of your chest was negative for pulmonary embolism. You were started on heparin drip and then changed to oral blood thinner eliquis. Please continue eliquis.      SECONDARY DISCHARGE DIAGNOSES  Diagnosis: Pulmonary nodules  Assessment and Plan of Treatment: Your CT scan of your chest shows pulmonary nodules. Please follow up for a repeat CT scan in 6 months.    Diagnosis: Pneumonia  Assessment and Plan of Treatment: You have a prior diagnosis of pneumonia. Your antibiotics were continued inpatient. PRINCIPAL DISCHARGE DIAGNOSIS  Diagnosis: Acute deep vein thrombosis (DVT) of femoral vein of both lower extremities  Assessment and Plan of Treatment: You presented with back and leg pain. You were found to have DVT of the bilateral lower extremities. The CT scan of your chest was negative for pulmonary embolism. You were started on heparin drip and then changed to oral blood thinner eliquis. Please continue eliquis 10 mg twice a day for 6 months and then continue with 5mg twice a day. Follow up with your primary doctor for further management after discharge. Recommend follow up within 1-2 weeks.      SECONDARY DISCHARGE DIAGNOSES  Diagnosis: Spondylosis  Assessment and Plan of Treatment: You complained of back pain. Your MRI showed spondylosis, ankylosis, very mild impingment on the spinal cord, and likely degeneration of the spinal column. Neurology and orthopedics were consulted and recommending no further workup. Pain management was consulted and adjusted your medications. Continue taking gabapentin, flexeril, and tramadol, and using the lidocaine patch. Obtain daily physical therapy. Follow up with your primary doctor within 1-2 weeks.    Diagnosis: Pulmonary nodules  Assessment and Plan of Treatment: Your CT scan of your chest shows pulmonary nodules. Please follow up for a repeat CT scan in 3-6 months.    Diagnosis: Pneumonia  Assessment and Plan of Treatment: You have a prior diagnosis of pneumonia. Your antibiotics were continued inpatient. You have completed your course. PRINCIPAL DISCHARGE DIAGNOSIS  Diagnosis: Acute deep vein thrombosis (DVT) of femoral vein of both lower extremities  Assessment and Plan of Treatment: You presented with back and leg pain. You were found to have DVT of the bilateral lower extremities. The CT scan of your chest was negative for pulmonary embolism. You were started on heparin drip and then changed to oral blood thinner eliquis. Please continue eliquis 10 mg twice a day for 6 months and then continue with 5mg twice a day. Follow up with your primary doctor for further management after discharge. Recommend follow up within 1-2 weeks.      SECONDARY DISCHARGE DIAGNOSES  Diagnosis: Spondylosis  Assessment and Plan of Treatment: You complained of back pain. Your MRI showed spondylosis, ankylosis, very mild impingment on the spinal cord, and likely degeneration of the spinal column. Neurology and orthopedics were consulted and recommending no further workup. Pain management was consulted and adjusted your medications. Continue taking gabapentin, flexeril, and tramadol, and using the lidocaine patch. Obtain daily physical therapy. Follow up with your primary doctor within 1-2 weeks.    Diagnosis: Pneumonia  Assessment and Plan of Treatment: You have a prior diagnosis of pneumonia. Your antibiotics were continued inpatient. You have completed your course.    Diagnosis: Pulmonary nodules  Assessment and Plan of Treatment: Your CT scan of your chest shows pulmonary nodules. Please follow up for a repeat CT scan in 3-6 months.    Diagnosis: CARINA (acute kidney injury)  Assessment and Plan of Treatment: CARINA (acute kidney injury) - resolved with IV fluids

## 2019-06-11 NOTE — PROGRESS NOTE ADULT - PROBLEM SELECTOR PLAN 7
C/w Simvastatin 20 mg   - F/u lipid profile C/w Simvastatin 20 mg   - F/u lipid panel On Metformin at home  - Sliding scale  - F/u A1C

## 2019-06-11 NOTE — CONSULT NOTE ADULT - ASSESSMENT
78 year old amle presented with bilat leg edema, cough, and blood tinged sputum.  Doppler showed bilat DVT.
I have reviewed the MRI. There is not a significant impression on the thecal sac or spinal nerves. His pain appears to be mostly due to degenerative disc and facet disease. Recommend non-steroidal anti-inflammatory agent, PT, epidural or facet blocks by pain management. LIANE, ESR, CRP, RPR, RF,  to investigate seropositive spondylopathies.
RAÚL certificate to [facility] at [email or web address] or by fax at [phone]. (Pharmacies can ignore this message.)   Patient Search </[email or web address]/applinks/cspnp/PatientSearch.action>   Multi-Patient Search </[email or web address]/applinks/cspnp/MultiPatientSearch.action>   Reports </doh2/applinks/cspnp/Reports.action>   Drug Listing </doh2/applinks/cspnp/DrugListing.action>   Designation </doh2/applinks/cspnp/Designation.action>   My RAÚL Numbers </doh2/applinks/cspnp/MyDeaNumbers.action>  Data Detail Level: Printer-Friendly View Extended View   Confidential Drug Utilization Report  Search Terms: jose negrete, 1940   Search Date: 06/11/2019 04:33:06 PM   The Drug Utilization Report below displays all of the controlled substance prescriptions, if any, that your patient has filled in the last twelve months. The information displayed on this report is compiled from pharmacy submissions to the Department, and accurately reflects the information as submitted by the pharmacies.  This report was requested by: Beatrice Fowler | Reference #: [id number]   Others' Prescriptions  Patient Name:	Jose Negrete	YOB: 1940	  Address:	[address] [place] [address]	Sex:	Male	    Rx Written	Rx Dispensed	Drug	Quantity	Days Supply	Prescriber Name			  04/09/2019	04/09/2019	tramadol hcl 50 mg tablet 	14	7	Jovany Puckett MD
77 y/o M w/ Lumbar Degenerative Disk Disease

## 2019-06-11 NOTE — PROGRESS NOTE ADULT - SUBJECTIVE AND OBJECTIVE BOX
leg swelling better  but mid back pain getting worse  no fever or chills    MEDICATIONS  (STANDING):  apixaban 10 milliGRAM(s) Oral every 12 hours  docusate sodium 100 milliGRAM(s) Oral two times a day  insulin lispro (HumaLOG) corrective regimen sliding scale   SubCutaneous Before meals and at bedtime  latanoprost 0.005% Ophthalmic Solution 1 Drop(s) Both EYES at bedtime  levoFLOXacin IVPB      levoFLOXacin IVPB 500 milliGRAM(s) IV Intermittent every 24 hours  lidocaine   Patch 1 Patch Transdermal daily  simvastatin 20 milliGRAM(s) Oral at bedtime  tamsulosin 0.4 milliGRAM(s) Oral at bedtime    MEDICATIONS  (PRN):  polyethylene glycol 3350 17 Gram(s) Oral daily PRN Constipation  traMADol 50 milliGRAM(s) Oral every 8 hours PRN Moderate Pain (4 - 6)      Allergies    No Known Allergies    Intolerances        Vital Signs Last 24 Hrs  T(C): 36.6 (11 Jun 2019 05:33), Max: 37.1 (10 Terry 2019 17:43)  T(F): 97.9 (11 Jun 2019 05:33), Max: 98.7 (10 Terry 2019 17:43)  HR: 78 (11 Jun 2019 05:33) (72 - 86)  BP: 142/92 (11 Jun 2019 05:33) (111/62 - 145/78)  BP(mean): --  RR: 16 (11 Jun 2019 05:33) (16 - 17)  SpO2: 98% (11 Jun 2019 05:33) (97% - 99%)    PHYSICAL EXAM  General: adult in NAD  HEENT: clear oropharynx, anicteric sclera, pink conjunctiva  Neck: supple  CV: normal S1/S2 with no murmur rubs or gallops  Lungs: positive air movement b/l ant lungs,clear to auscultation, no wheezes, no rales  Abdomen: soft non-tender non-distended, no hepatosplenomegaly  Ext: no clubbing cyanosis or edema  Skin: no rashes and no petechiae  Neuro: alert and oriented X 4, no focal deficits  LABS:                          12.5   6.01  )-----------( 218      ( 11 Jun 2019 06:03 )             37.7         Mean Cell Volume : 93.1 fl  Mean Cell Hemoglobin : 30.9 pg  Mean Cell Hemoglobin Concentration : 33.2 gm/dL  Auto Neutrophil # : 3.59 K/uL  Auto Lymphocyte # : 1.62 K/uL  Auto Monocyte # : 0.56 K/uL  Auto Eosinophil # : 0.21 K/uL  Auto Basophil # : 0.01 K/uL  Auto Neutrophil % : 59.7 %  Auto Lymphocyte % : 27.0 %  Auto Monocyte % : 9.3 %  Auto Eosinophil % : 3.5 %  Auto Basophil % : 0.2 %    Serial CBC  Hematocrit 37.7  Hemoglobin 12.5  Plat 218  RBC 4.05  WBC 6.01  Serial CBC  Hematocrit 35.9  Hemoglobin 11.7  Plat 207  RBC 3.84  WBC 6.22  Serial CBC  Hematocrit 34.4  Hemoglobin 11.3  Plat 172  RBC 3.69  WBC 6.80  Serial CBC  Hematocrit 38.5  Hemoglobin 12.4  Plat 186  RBC 4.08  WBC 6.95    06-11    136  |  102  |  21<H>  ----------------------------<  134<H>  4.3   |  27  |  1.42<H>    Ca    9.4      11 Jun 2019 06:03    TPro  7.1  /  Alb  2.9<L>  /  TBili  0.4  /  DBili  x   /  AST  17  /  ALT  21  /  AlkPhos  60  06-09      PTT - ( 11 Jun 2019 06:03 )  PTT:36.5 sec              BLOOD SMEAR INTERPRETATION:       RADIOLOGY & ADDITIONAL STUDIES:

## 2019-06-11 NOTE — CONSULT NOTE ADULT - ATTENDING COMMENTS
I saw and evaluated pt in bed with pain in back now better controlled. Has primarily LBP with some radiation to thighs.  X-rays reviewed including CT Abd and pelvis and MRI LS Spine and shows spondylosis and scoliosis.  Good general strength and sensation in legs.  Recommend pain control and mobilization, exercises, PT.  Uses cane for balance.  Discussed with pt.

## 2019-06-11 NOTE — PROGRESS NOTE ADULT - PROBLEM SELECTOR PLAN 2
P/w severe low black pain  - No spinal tenderness, b/l paraspinal tenderness  - Given back pain and DVT, concern for malignancy with mets  - Tumor markers negative  - C/w tramadol + lidocaine patch  - F/u XR  - Pain management consult P/w severe low black pain  - No spinal tenderness, b/l paraspinal tenderness  - Given back pain and DVT, concern for malignancy with mets  - Tumor markers negative  - C/w tramadol + lidocaine patch  - F/u MRI L spine  - Pain management consult P/w severe low black pain  - No spinal tenderness, b/l paraspinal tenderness  - Given back pain and DVT, concern for malignancy with mets  - Tumor markers negative  - C/w tramadol + lidocaine patch  - F/u MRI L spine  - Pain management consult  - PT recommending GABBY P/w low back pain  - MRI showing spondylosis with ankylosis, discitis vs degeneration at L2-L3, and mild impingement at T10-T11  - Neuro consult Dr Fink  - Ortho consult  - Pain mgmt consult

## 2019-06-11 NOTE — CONSULT NOTE ADULT - SUBJECTIVE AND OBJECTIVE BOX
Chief Complaint: lower back pain    HPI:  79 y/o M patient with a significant PMHx of Asthma, DM, HTN, Hypercholesterolemia, Glaucoma and PSHx of craniotomy for traumatic ICH (2008) presents to the ED with sob and sharp back pain . Patient reports he has been having worsening chronic back pain left greater than right for years, getting gradually worse for the past 3 months. Patient adds his back pain radiates to the b/l knees. Patient says he takes Tramadol for his back pain to no relief. Patient also adds he has been experiencing a cough for x 2 weeks and shortness of breath for the past week. Patient says he went to Dr. Puckett where he was prescribed Levaquin and methylprednisolone. Patient says he also uses Advair BID with no relief. Patient describes his phlegm as blood streaked and sometimes brown in color. Cough has improved with medication but SOB unchanged. Worse with exertion. PExam revealed B/L leg swelling and mild pain of the leg from the knee down . LE Doppler showed : Nonocclusive thrombus in bilateral superficial femoral and popliteal veins .  Pt denies numbness and tingling of the L leg, Denies fevers and chills. No pain when walking. Denies sedentary lifestyle, denies recent travel. Denies any deviation from her normal everyday activities. Denies family history of blood clots in the legs or lungs Patient denies headache , chest pain, fever, incontinence, focal weakness, paresthesias, or any other complaints. NKDA.      78 year old male with complaints of lower back pain. Pain radiates down both legs, -right > left.  Pt states that pain worsens with movement. No weakness.  No saddle anesthesia.  No bowel or bladder incontinence.  Pt complains of burning pain down legs.  No recent falls or trauma.  Pt also recently treated with Levaquin for pneumonia  + sob on admission. Pt was treated with abx here.  MRI shows Severe spondylosis related to scoliosis with multilevel ankylosis. Intradiscal and endplate signal change at L2-L3, most likely degenerative. Although discitis could be considered in the appropriate   clinical setting. Mild spinal cord impingement and spinal stenosis related to spondylosis at T10-11. No fevers or leukocytosis.  Elevated sed rate. Pt may have exacerbation of back pain due to recent pna and coughing.  Pt is able to get oob and ambulate to bathroom with walker.      EKG : NSR   UA : -ve (08 Jun 2019 23:58)      Pain Level:   Scale: VAS    PAST MEDICAL & SURGICAL HISTORY:  HTN (hypertension)  Asthma  Glaucoma  Hypercholesterolemia  Diabetes  H/O craniotomy      FAMILY HISTORY:  No pertinent family history in first degree relatives      SOCIAL HISTORY:  [x ] Denies Smoking, Alcohol, or Drug Use    Allergies    No Known Allergies    Intolerances        PAIN MEDICATIONS:  cyclobenzaprine 10 milliGRAM(s) Oral three times a day PRN  gabapentin 300 milliGRAM(s) Oral every 12 hours  traMADol 50 milliGRAM(s) Oral every 8 hours PRN      Heme:  apixaban 10 milliGRAM(s) Oral every 12 hours    Antibiotics:  levoFLOXacin IVPB      levoFLOXacin IVPB 500 milliGRAM(s) IV Intermittent every 24 hours    Cardiovascular:  tamsulosin 0.4 milliGRAM(s) Oral at bedtime    GI:  docusate sodium 100 milliGRAM(s) Oral two times a day  polyethylene glycol 3350 17 Gram(s) Oral daily    Endocrine:  insulin lispro (HumaLOG) corrective regimen sliding scale   SubCutaneous Before meals and at bedtime  simvastatin 20 milliGRAM(s) Oral at bedtime    All Other Medications:  latanoprost 0.005% Ophthalmic Solution 1 Drop(s) Both EYES at bedtime  lidocaine   Patch 1 Patch Transdermal daily      REVIEW OF SYSTEMS:    CONSTITUTIONAL: No fever, weight loss, or fatigue  RESPIRATORY: No cough, wheezing, chills, or hemoptysis, No SOB  NECK: No neck pain  CARDIOVASCULAR: No chest pain, palpitations, dizziness, or leg swelling  GASTROINTESTINAL: No abdominal or epigastric pain.  No nausea, vomiting, or hematemesis.  No diarrhea. + constipation.  GENITOURINARY: No dysuria, frequency, hematuria or incontinence  NEUROLOGICAL: No headaches, memory loss, loss of strength, numbness or tremors  MUSCULOSKELETAL: + lower back pain - across back + radiculopathy down legs     Vital Signs Last 24 Hrs  T(C): 36.1 (11 Jun 2019 15:14), Max: 37.1 (10 Terry 2019 17:43)  T(F): 97 (11 Jun 2019 15:14), Max: 98.7 (10 Terry 2019 17:43)  HR: 82 (11 Jun 2019 15:14) (72 - 84)  BP: 116/66 (11 Jun 2019 15:14) (111/62 - 145/78)  BP(mean): --  RR: 15 (11 Jun 2019 15:14) (15 - 16)  SpO2: 100% (11 Jun 2019 15:14) (98% - 100%)    PAIN SCORE:    5/10     SCALE USED: (1-10 VNRS)    PHYSICAL EXAM:    GENERAL: NAD, well-groomed, well-developed   NERVOUS SYSTEM: Alert and oriented x3, Motor strength 5/5 B/L upper and lower extremities, SLR - Pt can extent to almost 80 degrees  CHEST/LUNG: Clear to percussion bilaterally, no rale, rhonchi or wheezing  HEART: Regular rate and rhythm, No murmurs, rubs, or gallops   ABDOMEN: Soft, nontender, nondistended, Bowel sounds present  BACK: No CVA tenderness, No spine tenderness + lower paraspinal tenderness   EXTREMITIES: +2 peripheral extremities, no edema, cyanosis + decreased rom     LABS:                          12.5   6.01  )-----------( 218      ( 11 Jun 2019 06:03 )             37.7     06-11    136  |  102  |  21<H>  ----------------------------<  134<H>  4.3   |  27  |  1.42<H>    Ca    9.4      11 Jun 2019 06:03      PTT - ( 11 Jun 2019 06:03 )  PTT:36.5 sec      RADIOLOGY:  < from: CT Abdomen and Pelvis w/ IV Cont (06.11.19 @ 14:42) >  EXAM:  CT ABDOMEN AND PELVIS IC                            PROCEDURE DATE:  06/11/2019          INTERPRETATION:  CLINICAL INDICATION: 78 years  Male with weight loss and   DVT.     COMPARISON: Noncontrast CT dated 5/23/2017    PROCEDURE:   CT of the Abdomen and Pelvis was performed with intravenous contrast.   Intravenous contrast: 90 ml Omnipaque 350. 10 ml discarded.  Oral contrast: None.  Sagittal and coronal reformats were performed.    LIMITATION: Lack of enteric contrast limits evaluationof the GI tract.    FINDINGS:    LOWER CHEST: Within normal limits.    LIVER: Within normal limits.  BILE DUCTS: Normal caliber.  GALLBLADDER: Multiple calcified gallstones in the gallbladder. No   pericholecystic edema.  SPLEEN: Within normal limits.  PANCREAS: Within normal limits.  ADRENALS: Within normal limits.  KIDNEYS/URETERS: 1.7 cm and 6 mm left lower pole renal cyst. No   hydronephrosis, solid mass or calculus. Bilateral symmetrical nephrograms.    BLADDER: Decompressed  REPRODUCTIVE ORGANS: Prostate is enlarged measuring 5.9 x 4.4 cm in axial   plane.    BOWEL: Retained fecal matter throughout the colon with mildly distended   rectum up to 6.0 cm. The stomach contains debris. The appendix is not   visualized and cannot be assessed  PERITONEUM: No ascites.  VESSELS:  IVC filter in situ with the tip at the level of L2.  RETROPERITONEUM: No lymphadenopathy.    ABDOMINAL WALL: Within normal limits.  BONES: Severe rotatory dextroscoliosis reidentified. Thoracolumbar   degenerative changes.    IMPRESSION:     Lack of enteric contrast limits evaluation of the GI tract.    Enlarged prostate.    Cholelithiasis.    IVC filter.    Constipated colon with mild rectal distention.    < end of copied text >    < from: MR Lumbar Spine No Cont (06.11.19 @ 11:32) >    INTERPRETATION:  MRI lumbar spine without contrast    History back pain and weight loss    Limited comparison made with CT from 05/23/17    There is moderately severe upper lumbar dextroscoliosis. There is no   compression deformity. There is degenerative disc change and dorsal disc   osteophyte complex mildly deforming the ventral spinal cord with overall   mild spinal stenosis at T10-11. Dorsal osteophyte encroaches on and is   not displace or deform the spinal cord at T11-12.    There is ankylosis at T12-L1 and L1-L2 without dorsal bulging or ridging   or spinal stenosis    There is severe disc space narrowing with increased T2 signal in the   endplates and disc at L2-L3. There is no rudy endplate destruction There   is mild dorsal osteophyte with pronounced far lateral osteophyte. There   is severe bilateral foraminal stenosis and mild central canal stenosis    . There is ankylosis of the L3-L4 disc space. There is severe facet   arthropathy on the right. Far lateral osteophyte results in mild   right-sided foraminal stenosis without central canal stenosis    There is grade 1 degenerative spondylolisthesis related to severe   right-sided facet arthropathy at L4-L5. There is mild right-sided   foraminal stenosis without central canal stenosis.    There is severe degenerative disc change at L5-S1 with minimal   anterolisthesis related to severe right and moderate left facet   arthropathy without significant spinal canal or foraminal stenosis    IMPRESSION:    Severe spondylosis related to scoliosis with multilevel ankylosis.   Intradiscal and endplate signal change at L2-L3, most likely   degenerative. Although discitis could be considered in the appropriate   clinical setting.    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    < end of copied text >    Drug Screen:        RECOMMENDATIONS    [ ]  [place]  Reviewed and Copied to Chart

## 2019-06-11 NOTE — CONSULT NOTE ADULT - PROBLEM SELECTOR RECOMMENDATION 9
unprovoked bilat DVT  it is unlikely to be congenital thrombophilia  will fo lupus, and tumor marker
- pt with lower back pain with bilateral sciatica  - gabapentin 300mg po q 12 hours  - avoid nsaids due to ckd  - flexeril 10mg po q 8 hours prn  - tramadol po prn - pt is on this at home  - MRI - Severe spondylosis related to scoliosis with multilevel ankylosis.   Intradiscal and endplate signal change at L2-L3, most likely   degenerative. Although discitis could be considered in the appropriate   clinical setting. Mild spinal cord impingement and spinal stenosis related to spondylosis at T10-11.  ? discitis - no neurological symptoms such as weakness.  + pain.  No fever or leukocytosis.   - ortho spine consult

## 2019-06-11 NOTE — CONSULT NOTE ADULT - SUBJECTIVE AND OBJECTIVE BOX
Patient is a 78y old  Male who presents with a chief complaint of B/L LE DVT (11 Jun 2019 17:14)      HPI:  77 y/o presented with shortness of breath and back pain. He reports worsening chronic back pain left greater than right for years, with increased pain for the past 3 months. Sharp and aching in quality, he adds his back pain radiates to the both knees. Tramadol does not relieve the pain. Cough and shortness of breath in the past 2 weeks have added to his problems. The cough aggravates his back pain. Dr. Puckett prescribed Levaquin and methylprednisolone. He also uses Advair BID with no relief. Expectoration is sometimes  blood streaked and sometimes brown in color. Cough has improved with medication but SOB unchanged. Worse with exertion. Bilateral leg swelling and mild pain of the leg from the knee down  was investigated by doppler that demonstrated a nonocclusive thrombus in bilateral superficial femoral and popliteal veins .  He denies numbness and tingling or weakness of the L leg, Denies fevers and chills. No pain when walking. Denies sedentary lifestyle, denies recent travel. Denies any deviation from her normal everyday activities. Denies family history of blood clots in the legs or lungs Patient denies headache , chest pain, fever, incontinence, focal weakness, paresthesias, or any other complaints. NKDA.    PAST MEDICAL & SURGICAL HISTORY:  HTN (hypertension)  Asthma  Glaucoma  Hypercholesterolemia  Diabetes  H/O craniotomy      FAMILY HISTORY:  No pertinent family history in first degree relatives        Social Hx:  Nonsmoker, no drug or alcohol use    MEDICATIONS  (STANDING):  apixaban 10 milliGRAM(s) Oral every 12 hours  docusate sodium 100 milliGRAM(s) Oral two times a day  gabapentin 300 milliGRAM(s) Oral every 12 hours  insulin lispro (HumaLOG) corrective regimen sliding scale   SubCutaneous Before meals and at bedtime  latanoprost 0.005% Ophthalmic Solution 1 Drop(s) Both EYES at bedtime  levoFLOXacin IVPB      levoFLOXacin IVPB 500 milliGRAM(s) IV Intermittent every 24 hours  lidocaine   Patch 1 Patch Transdermal daily  polyethylene glycol 3350 17 Gram(s) Oral daily  simvastatin 20 milliGRAM(s) Oral at bedtime  tamsulosin 0.4 milliGRAM(s) Oral at bedtime       Allergies    No Known Allergies    Intolerances        ROS: Pertinent positives in HPI, all other ROS were reviewed and are negative.      Vital Signs Last 24 Hrs  T(C): 36.4 (11 Jun 2019 20:40), Max: 36.6 (11 Jun 2019 05:33)  T(F): 97.6 (11 Jun 2019 20:40), Max: 97.9 (11 Jun 2019 05:33)  HR: 84 (11 Jun 2019 20:40) (73 - 84)  BP: 129/77 (11 Jun 2019 20:40) (116/66 - 142/92)  BP(mean): --  RR: 17 (11 Jun 2019 20:40) (15 - 17)  SpO2: 100% (11 Jun 2019 20:40) (98% - 100%)        Constitutional: awake and alert.  HEENT: PERRLA, EOMI,   Neck: Supple.  Respiratory: Breath sounds are clear bilaterally  Cardiovascular: S1 and S2, regular / irregular rhythm  Gastrointestinal: soft, nontender  Extremities:  no edema  Vascular: Caritid Bruit - no  Musculoskeletal: no joint swelling/tenderness, no abnormal movements  Skin: No rashes    Neurological exam:  HF: A x O x 3. Appropriately interactive, normal affect. Speech fluent, No Aphasia or paraphasic errors. Naming /repetition intact   CN: ANOOP, EOMI, VFF, facial sensation normal, no NLFD, tongue midline, Palate moves equally, SCM equal bilaterally  Motor: No pronator drift, Strength 5/5 in all 4 ext, normal bulk and tone, no tremor, rigidity or bradykinesia.    Sens: Intact to light touch / PP/ VS/ JS    Reflexes: Symmetric and normal . BJ 2+, BR 2+, KJ 2+, AJ 2+, downgoing toes b/l  Coord:  No FNFA, dysmetria, SONJA intact   Gait/Balance: Cannot test because he cannot stand due to pain  Bilateral SLRT + 40 degrees      Labs:                        12.5   6.01  )-----------( 218      ( 11 Jun 2019 06:03 )             37.7     06-11    136  |  102  |  21<H>  ----------------------------<  134<H>  4.3   |  27  |  1.42<H>    Ca    9.4      11 Jun 2019 06:03          PTT - ( 11 Jun 2019 06:03 )  PTT:36.5 sec    Radiology report:  < from: MR Lumbar Spine No Cont (06.11.19 @ 11:32) >  EXAM:  MR SPINE LUMBAR                            PROCEDURE DATE:  06/11/2019          INTERPRETATION:  MRI lumbar spine without contrast    History back pain and weight loss    Limited comparison made with CT from 05/23/17    There is moderately severe upper lumbar dextroscoliosis. There is no   compression deformity. There is degenerative disc change and dorsal disc   osteophyte complex mildly deforming the ventral spinal cord with overall   mild spinal stenosis at T10-11. Dorsal osteophyte encroaches on and is   not displace or deform the spinal cord at T11-12.    There is ankylosis at T12-L1 and L1-L2 without dorsal bulging or ridging   or spinal stenosis    There is severe disc space narrowing with increased T2 signal in the   endplates and disc at L2-L3. There is no rudy endplate destruction There   is mild dorsal osteophyte with pronounced far lateral osteophyte. There   is severe bilateral foraminal stenosis and mild central canal stenosis    . There is ankylosis of the L3-L4 disc space. There is severe facet   arthropathy on the right. Far lateral osteophyte results in mild   right-sided foraminal stenosis without central canal stenosis    There is grade 1 degenerative spondylolisthesis related to severe   right-sided facet arthropathy at L4-L5. There is mild right-sided   foraminal stenosis without central canal stenosis.    There is severe degenerative disc change at L5-S1 with minimal   anterolisthesis related to severe right and moderate left facet   arthropathy without significant spinal canal or foraminal stenosis    IMPRESSION:    Severe spondylosis related to scoliosis with multilevel ankylosis.   Intradiscal and endplate signal change at L2-L3, most likely   degenerative. Although discitis could be considered in the appropriate   clinical setting.    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  NANI AIKEN M.D., ATTENDING RADIOLOGIST  This document has been electronically signed. Jun 11 2019  1:13PM    < end of copied text >

## 2019-06-11 NOTE — DISCHARGE NOTE PROVIDER - HOSPITAL COURSE
HPI:    77 y/o M patient with a significant PMHx of Asthma, DM, HTN, Hypercholesterolemia, Glaucoma and PSHx of craniotomy for traumatic ICH (2008) presents to the ED with sob and sharp back pain . Patient reports he has been having worsening chronic back pain left greater than right for years, getting gradually worse for the past 3 months. Patient adds his back pain radiates to the b/l knees. Patient says he takes Tramadol for his back pain to no relief. Patient also adds he has been experiencing a cough for x 2 weeks and shortness of breath for the past week. Patient says he went to Dr. Puckett where he was prescribed Levaquin and methylprednisolone. Patient says he also uses Advair BID with no relief. Patient describes his phlegm as blood streaked and sometimes brown in color. Cough has improved with medication but SOB unchanged. Worse with exertion. PExam revealed B/L leg swelling and mild pain of the leg from the knee down . LE Doppler showed : Nonocclusive thrombus in bilateral superficial femoral and popliteal veins .  Pt denies numbness and tingling of the L leg, Denies fevers and chills. No pain when walking. Denies sedentary lifestyle, denies recent travel. Denies any deviation from her normal everyday activities. Denies family history of blood clots in the legs or lungs Patient denies headache , chest pain, fever, incontinence, focal weakness, paresthesias, or any other complaints. NKDA.        EKG : NSR     UA : -ve (08 Jun 2019 23:58)        Hospital Course: Started levaquin as patient has previous dx of pneumonia. US showed b/l DVT of LE, started heparin drip. CTA negative for PE. Converted heparin drip to eliquis. Dr Nieto heme onc consulted. 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 Pain mgmt consulted. PT recommending GABBY. HPI:    79 y/o M patient with a significant PMHx of Asthma, DM, HTN, Hypercholesterolemia, Glaucoma and PSHx of craniotomy for traumatic ICH (2008) presents to the ED with sob and sharp back pain . Patient reports he has been having worsening chronic back pain left greater than right for years, getting gradually worse for the past 3 months. Patient adds his back pain radiates to the b/l knees. Patient says he takes Tramadol for his back pain to no relief. Patient also adds he has been experiencing a cough for x 2 weeks and shortness of breath for the past week. Patient says he went to Dr. Puckett where he was prescribed Levaquin and methylprednisolone. Patient says he also uses Advair BID with no relief. Patient describes his phlegm as blood streaked and sometimes brown in color. Cough has improved with medication but SOB unchanged. Worse with exertion. PExam revealed B/L leg swelling and mild pain of the leg from the knee down . LE Doppler showed : Nonocclusive thrombus in bilateral superficial femoral and popliteal veins .  Pt denies numbness and tingling of the L leg, Denies fevers and chills. No pain when walking. Denies sedentary lifestyle, denies recent travel. Denies any deviation from her normal everyday activities. Denies family history of blood clots in the legs or lungs Patient denies headache , chest pain, fever, incontinence, focal weakness, paresthesias, or any other complaints. NKDA.        EKG : NSR     UA : -ve (08 Jun 2019 23:58)        Hospital Course: Started levaquin as patient has previous dx of pneumonia. US showed b/l DVT of LE, started heparin drip. CTA negative for PE. Converted heparin drip to eliquis. Dr Nieto heme onc consulted. 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 Pain mgmt, neuro, and ortho consulted. PT recommending GABBY. HPI:    77 y/o M patient with a significant PMHx of Asthma, DM, HTN, Hypercholesterolemia, Glaucoma and PSHx of craniotomy for traumatic ICH (2008) presents to the ED with sob and sharp back pain . Patient reports he has been having worsening chronic back pain left greater than right for years, getting gradually worse for the past 3 months. Patient adds his back pain radiates to the b/l knees. Patient says he takes Tramadol for his back pain to no relief. Patient also adds he has been experiencing a cough for x 2 weeks and shortness of breath for the past week. Patient says he went to Dr. Puckett where he was prescribed Levaquin and methylprednisolone. Patient says he also uses Advair BID with no relief. Patient describes his phlegm as blood streaked and sometimes brown in color. Cough has improved with medication but SOB unchanged. Worse with exertion. PExam revealed B/L leg swelling and mild pain of the leg from the knee down . LE Doppler showed : Nonocclusive thrombus in bilateral superficial femoral and popliteal veins .  Pt denies numbness and tingling of the L leg, Denies fevers and chills. No pain when walking. Denies sedentary lifestyle, denies recent travel. Denies any deviation from her normal everyday activities. Denies family history of blood clots in the legs or lungs Patient denies headache , chest pain, fever, incontinence, focal weakness, paresthesias, or any other complaints. NKDA.        EKG : NSR     UA : -ve (08 Jun 2019 23:58)        Hospital Course: Started levaquin as patient has previous dx of pneumonia. US showed b/l DVT of LE, started heparin drip. CTA negative for PE. Converted heparin drip to eliquis. Dr Nieto heme onc consulted. 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 Pain mgmt, neuro, and ortho consulted. Neuro and ortho recommending pain control and conservative management. Pain put him on gabapentin, flexeril, tramadol. PT recommending GABBY. Medically stable for dc as per attending. Plan has been d d/w Dr Tucker. Please see chart for full hospital course as this is just a brief summary. HPI:    77 y/o M patient with a significant PMHx of Asthma, DM, HTN, Hypercholesterolemia, Glaucoma and PSHx of craniotomy for traumatic ICH (2008) presents to the ED with sob and sharp back pain . Patient reports he has been having worsening chronic back pain left greater than right for years, getting gradually worse for the past 3 months. Patient adds his back pain radiates to the b/l knees. Patient says he takes Tramadol for his back pain to no relief. Patient also adds he has been experiencing a cough for x 2 weeks and shortness of breath for the past week. Patient says he went to Dr. Puckett where he was prescribed Levaquin and methylprednisolone. Patient says he also uses Advair BID with no relief. Patient describes his phlegm as blood streaked and sometimes brown in color. Cough has improved with medication but SOB unchanged. Worse with exertion. PExam revealed B/L leg swelling and mild pain of the leg from the knee down . LE Doppler showed : Nonocclusive thrombus in bilateral superficial femoral and popliteal veins .  Pt denies numbness and tingling of the L leg, Denies fevers and chills. No pain when walking. Denies sedentary lifestyle, denies recent travel. Denies any deviation from her normal everyday activities. Denies family history of blood clots in the legs or lungs Patient denies headache , chest pain, fever, incontinence, focal weakness, paresthesias, or any other complaints. NKDA.        EKG : NSR     UA : -ve (08 Jun 2019 23:58)        Hospital Course: Started levaquin as patient has previous dx of pneumonia. US showed b/l DVT of LE, started heparin drip. CTA negative for PE. Converted heparin drip to eliquis. Dr Nieto heme onc consulted. 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 Pain mgmt, neuro, and ortho consulted. Neuro and ortho recommending pain control and conservative management. Pain put him on gabapentin, flexeril, tramadol. PT recommending GABBY. Medically stable for dc as per attending. Plan has been d d/w Dr Trimble. Please see chart for full hospital course as this is just a brief summary. HPI:    77 y/o M patient with a significant PMHx of Asthma, DM, HTN, Hypercholesterolemia, Glaucoma and PSHx of craniotomy for traumatic ICH (2008) presents to the ED with sob and sharp back pain. Patient reports he has been having worsening chronic back pain left greater than right for years, getting gradually worse for the past 3 months. Patient adds his back pain radiates to the b/l knees. Patient says he takes Tramadol for his back pain to no relief. Patient also adds he has been experiencing a cough for x 2 weeks and shortness of breath for the past week. Patient says he went to Dr. Puckett where he was prescribed Levaquin and methylprednisolone. Patient says he also uses Advair BID with no relief. Patient describes his phlegm as blood streaked and sometimes brown in color. Cough has improved with medication but SOB unchanged. Worse with exertion. PExam revealed B/L leg swelling and mild pain of the leg from the knee down. LE Doppler showed : Nonocclusive thrombus in bilateral superficial femoral and popliteal veins.  Patient denies numbness and tingling of the leg, Denies fevers and chills. No pain when walking. Denies sedentary lifestyle, denies recent travel. Denies any deviation from her normal everyday activities. Denies family history of blood clots in the legs or lungs Patient denies headache , chest pain, fever, incontinence, focal weakness, paresthesias, or any other complaints. NKDA.        EKG : NSR     UA : -ve (08 Jun 2019 23:58)        Hospital Course: Started levaquin as patient has previous dx of pneumonia. US showed b/l DVT of LE, started heparin drip. CTA negative for PE. Converted heparin drip to eliquis. Dr Nieto heme onc consulted. 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 Pain mgmt, neuro, and ortho consulted. Neuro and ortho recommending pain control and conservative management. Pain management team put him on gabapentin, flexeril, oxycodone IR. PT recommending GABBY. Medically stable for discahrge. Plan has been d/w Dr Trimble. Please see chart for full hospital course as this is just a brief summary.

## 2019-06-11 NOTE — PROGRESS NOTE ADULT - SUBJECTIVE AND OBJECTIVE BOX
PGY1 Note discussed with Supervising Resident and Primary Attending.    Patient is a 78y old  Male who presents with a chief complaint of B/L LE DVT (11 Jun 2019 10:04)      INTERVAL HPI/OVERNIGHT EVENTS:  No acute overnight events. Patient seen and examined at bedside. Patient has no current complaints. Patient denies nausea, vomiting, diarrhea, chest pain, SOB, headache.    MEDICATIONS  (STANDING):  apixaban 10 milliGRAM(s) Oral every 12 hours  docusate sodium 100 milliGRAM(s) Oral two times a day  insulin lispro (HumaLOG) corrective regimen sliding scale   SubCutaneous Before meals and at bedtime  latanoprost 0.005% Ophthalmic Solution 1 Drop(s) Both EYES at bedtime  levoFLOXacin IVPB      levoFLOXacin IVPB 500 milliGRAM(s) IV Intermittent every 24 hours  lidocaine   Patch 1 Patch Transdermal daily  simvastatin 20 milliGRAM(s) Oral at bedtime  tamsulosin 0.4 milliGRAM(s) Oral at bedtime    MEDICATIONS  (PRN):  polyethylene glycol 3350 17 Gram(s) Oral daily PRN Constipation  traMADol 50 milliGRAM(s) Oral every 8 hours PRN Moderate Pain (4 - 6)      Allergies    No Known Allergies    Intolerances        REVIEW OF SYSTEMS :  * General: denies chills, fatigue  * Eyes: denies vision changes  * Respiratory: denies cough, SOB, wheezing  * Cardio: denies chest pain, palpitations  * GI: denies abd pain, nausea, vomiting, diarrhea  * : denies dysuria  * Neuro: denies headaches, numbness, weakness  * MSK: denies joint pain  * Skin: denies itching, rashes    Vital Signs Last 24 Hrs  T(C): 36.6 (11 Jun 2019 05:33), Max: 37.1 (10 Terry 2019 17:43)  T(F): 97.9 (11 Jun 2019 05:33), Max: 98.7 (10 Terry 2019 17:43)  HR: 78 (11 Jun 2019 05:33) (72 - 86)  BP: 142/92 (11 Jun 2019 05:33) (111/62 - 145/78)  BP(mean): --  RR: 16 (11 Jun 2019 05:33) (16 - 17)  SpO2: 98% (11 Jun 2019 05:33) (97% - 99%)    PHYSICAL EXAM :  * General: no acute distress, well-nourished, well-developed  * HEENT: atraumatic, normocephalic; moist mucus membranes; supple neck; no JVD  * CN: EOMI, PERRL  * Respiratory: cta b/l; no wheezes, rales, rhonchi  * Cardio: RRR; no appreciable murmurs, rubs, gallops  * Abd: soft, nontender, nondistended, +BS  * Neuro: AAOx3; strength 5/5; sensation intact throughout  * Extremities: no clubbing, cyanosis, edema  * Skin: no rashes    LABS:                          12.5   6.01  )-----------( 218      ( 11 Jun 2019 06:03 )             37.7     06-11    136  |  102  |  21<H>  ----------------------------<  134<H>  4.3   |  27  |  1.42<H>    Ca    9.4      11 Jun 2019 06:03    TPro  7.1  /  Alb  2.9<L>  /  TBili  0.4  /  DBili  x   /  AST  17  /  ALT  21  /  AlkPhos  60  06-09    PTT - ( 11 Jun 2019 06:03 )  PTT:36.5 sec    CAPILLARY BLOOD GLUCOSE      POCT Blood Glucose.: 142 mg/dL (11 Jun 2019 07:53)  POCT Blood Glucose.: 152 mg/dL (10 Terry 2019 22:15)  POCT Blood Glucose.: 114 mg/dL (10 Terry 2019 16:40)  POCT Blood Glucose.: 175 mg/dL (10 Terry 2019 12:08)  POCT Blood Glucose.: 221 mg/dL (10 Terry 2019 11:19)      RADIOLOGY & ADDITIONAL TESTS:   no new imaging    Imaging Personally Reviewed:    Consultant(s) Notes Reviewed: PGY1 Note discussed with Supervising Resident and Primary Attending.    Patient is a 78y old  Male who presents with a chief complaint of B/L LE DVT (11 Jun 2019 10:04)      INTERVAL HPI/OVERNIGHT EVENTS:  No acute overnight events. Patient seen and examined at bedside. Patient is reporting back pain. Patient denies nausea, vomiting, diarrhea, chest pain, SOB, headache.    MEDICATIONS  (STANDING):  apixaban 10 milliGRAM(s) Oral every 12 hours  docusate sodium 100 milliGRAM(s) Oral two times a day  insulin lispro (HumaLOG) corrective regimen sliding scale   SubCutaneous Before meals and at bedtime  latanoprost 0.005% Ophthalmic Solution 1 Drop(s) Both EYES at bedtime  levoFLOXacin IVPB      levoFLOXacin IVPB 500 milliGRAM(s) IV Intermittent every 24 hours  lidocaine   Patch 1 Patch Transdermal daily  simvastatin 20 milliGRAM(s) Oral at bedtime  tamsulosin 0.4 milliGRAM(s) Oral at bedtime    MEDICATIONS  (PRN):  polyethylene glycol 3350 17 Gram(s) Oral daily PRN Constipation  traMADol 50 milliGRAM(s) Oral every 8 hours PRN Moderate Pain (4 - 6)      Allergies    No Known Allergies    Intolerances        REVIEW OF SYSTEMS:  * General: denies chills, fatigue  * Eyes: denies vision changes  * Respiratory: denies cough, SOB, wheezing  * Cardio: denies chest pain, palpitations  * GI: denies abd pain, nausea, vomiting, diarrhea  * : denies dysuria  * Neuro: denies headaches, numbness, weakness  * MSK: denies joint pain; reports back pain  * Skin: denies itching, rashes    Vital Signs Last 24 Hrs  T(C): 36.6 (11 Jun 2019 05:33), Max: 37.1 (10 Terry 2019 17:43)  T(F): 97.9 (11 Jun 2019 05:33), Max: 98.7 (10 Terry 2019 17:43)  HR: 78 (11 Jun 2019 05:33) (72 - 86)  BP: 142/92 (11 Jun 2019 05:33) (111/62 - 145/78)  BP(mean): --  RR: 16 (11 Jun 2019 05:33) (16 - 17)  SpO2: 98% (11 Jun 2019 05:33) (97% - 99%)    PHYSICAL EXAM:  * General: no acute distress, well-nourished, well-developed  * HEENT: atraumatic, normocephalic; moist mucus membranes; supple neck; no JVD  * CN: EOMI, PERRL  * Respiratory: cta b/l; no wheezes, rales, rhonchi  * Cardio: RRR; no appreciable murmurs, rubs, gallops  * Abd: soft, nontender, nondistended, +BS  * Neuro: AAOx3; strength 5/5; sensation intact throughout  * Extremities: no clubbing, cyanosis, edema  * Skin: no rashes    LABS:                          12.5   6.01  )-----------( 218      ( 11 Jun 2019 06:03 )             37.7     06-11    136  |  102  |  21<H>  ----------------------------<  134<H>  4.3   |  27  |  1.42<H>    Ca    9.4      11 Jun 2019 06:03    TPro  7.1  /  Alb  2.9<L>  /  TBili  0.4  /  DBili  x   /  AST  17  /  ALT  21  /  AlkPhos  60  06-09    PTT - ( 11 Jun 2019 06:03 )  PTT:36.5 sec    CAPILLARY BLOOD GLUCOSE      POCT Blood Glucose.: 142 mg/dL (11 Jun 2019 07:53)  POCT Blood Glucose.: 152 mg/dL (10 Terry 2019 22:15)  POCT Blood Glucose.: 114 mg/dL (10 Terry 2019 16:40)  POCT Blood Glucose.: 175 mg/dL (10 Terry 2019 12:08)  POCT Blood Glucose.: 221 mg/dL (10 Terry 2019 11:19)      RADIOLOGY & ADDITIONAL TESTS:   Imaging Personally Reviewed:  CTA negative for PE    Consultant(s) Notes Reviewed: yes PGY1 Note discussed with Supervising Resident and Primary Attending.    Patient is a 78 year old  Male who presents with a chief complaint of B/L LE DVT (11 Jun 2019 10:04)      INTERVAL HPI/OVERNIGHT EVENTS:  No acute overnight events. Patient seen and examined at bedside. Patient is reporting back pain. Patient denies nausea, vomiting, diarrhea, chest pain, SOB, headache.    MEDICATIONS  (STANDING):  apixaban 10 milliGRAM(s) Oral every 12 hours  docusate sodium 100 milliGRAM(s) Oral two times a day  insulin lispro (HumaLOG) corrective regimen sliding scale   SubCutaneous Before meals and at bedtime  latanoprost 0.005% Ophthalmic Solution 1 Drop(s) Both EYES at bedtime  levoFLOXacin IVPB      levoFLOXacin IVPB 500 milliGRAM(s) IV Intermittent every 24 hours  lidocaine   Patch 1 Patch Transdermal daily  simvastatin 20 milliGRAM(s) Oral at bedtime  tamsulosin 0.4 milliGRAM(s) Oral at bedtime    MEDICATIONS  (PRN):  polyethylene glycol 3350 17 Gram(s) Oral daily PRN Constipation  traMADol 50 milliGRAM(s) Oral every 8 hours PRN Moderate Pain (4 - 6)      Allergies    No Known Allergies            REVIEW OF SYSTEMS:  * General: denies chills, fatigue  * Eyes: denies vision changes  * Respiratory: denies cough, SOB, wheezing  * Cardio: denies chest pain, palpitations  * GI: denies abd pain, nausea, vomiting, diarrhea  * : denies dysuria  * Neuro: denies headaches, numbness, weakness  * MSK: denies joint pain; reports back pain  * Skin: denies itching, rashes    Vital Signs Last 24 Hrs  T(C): 36.6 (11 Jun 2019 05:33), Max: 37.1 (10 Terry 2019 17:43)  T(F): 97.9 (11 Jun 2019 05:33), Max: 98.7 (10 Terry 2019 17:43)  HR: 78 (11 Jun 2019 05:33) (72 - 86)  BP: 142/92 (11 Jun 2019 05:33) (111/62 - 145/78)  RR: 16 (11 Jun 2019 05:33) (16 - 17)  SpO2: 98% (11 Jun 2019 05:33) (97% - 99%)    PHYSICAL EXAM:  * General: no acute distress, well-nourished, well-developed  * HEENT: atraumatic, normocephalic; moist mucus membranes; supple neck; no JVD  * CN: EOMI, PERRL  * Respiratory: cta b/l; no wheezes, rales, rhonchi  * Cardio: RRR; no appreciable murmurs, rubs, gallops  * Abd: soft, nontender, nondistended, +BS  * Neuro: AAOx3; strength 5/5; sensation intact throughout  * Extremities: no clubbing, cyanosis, edema  * Skin: no rashes    LABS:                          12.5   6.01  )-----------( 218      ( 11 Jun 2019 06:03 )             37.7     06-11    136  |  102  |  21<H>  ----------------------------<  134<H>  4.3   |  27  |  1.42<H>    Ca    9.4      11 Jun 2019 06:03    TPro  7.1  /  Alb  2.9<L>  /  TBili  0.4  /  DBili  x   /  AST  17  /  ALT  21  /  AlkPhos  60  06-09    PTT - ( 11 Jun 2019 06:03 )  PTT:36.5 sec    CAPILLARY BLOOD GLUCOSE  POCT Blood Glucose.: 142 mg/dL (11 Jun 2019 07:53)  POCT Blood Glucose.: 152 mg/dL (10 Terry 2019 22:15)  POCT Blood Glucose.: 114 mg/dL (10 Terry 2019 16:40)  POCT Blood Glucose.: 175 mg/dL (10 Terry 2019 12:08)  POCT Blood Glucose.: 221 mg/dL (10 Terry 2019 11:19)      RADIOLOGY & ADDITIONAL TESTS:   Imaging Personally Reviewed:  CTA negative for PE    Consultant(s) Notes Reviewed: yes

## 2019-06-11 NOTE — PHYSICAL THERAPY INITIAL EVALUATION ADULT - DIAGNOSIS, PT EVAL
Patient presents with slightly impaired balance during ambulation and transfers secondary to generalized weakness, fatigue and low back pain.

## 2019-06-11 NOTE — PROGRESS NOTE ADULT - PROBLEM SELECTOR PLAN 1
P/w b/l LE swelling and mild pain of the leg from the knee down  - LE doppler showed non-occlusive thrombus in bilateral superficial femoral and popliteal veins  - HD stable, no SOB  - -ve jemima's sign  - D dimer of 4000  - C/w heparin drip  - Elevation of the LLE above the level of the heart  - Tumor markers negative  - CTA negative for PE  - Can change to Eliquis on dc  - F/u echo  - F/u hypercoagulable work-up  - Heme consult Dr Nieto P/w b/l LE swelling and mild pain of the leg from the knee down  - LE doppler showed non-occlusive thrombus in bilateral superficial femoral and popliteal veins  - HD stable, no SOB  - -ve jemima's sign  - D dimer of 4000  - Changed heparin drip to eliquis  - Elevation of the LLE above the level of the heart  - Tumor markers negative  - CTA negative for PE  - F/u echo  - F/u hypercoagulable work-up  - Molina Nieto

## 2019-06-11 NOTE — PROGRESS NOTE ADULT - PROBLEM SELECTOR PLAN 8
IMPROVE VTE Individual Risk Assessment    RISK                                                          Points  [x] current VTE                                           3  [] Thrombophilia                                        2  [] Lower limb paralysis                              2   [] Current Cancer                                       2   [x] Immobilization > 24 hrs                        1  [] ICU/CCU stay > 24 hours                       1  [x] Age > 60                                                   1    IMPROVE VTE Score: 5  on Heparin drip for LLE DVT  no need for GI ppx. IMPROVE VTE Individual Risk Assessment    RISK                                                          Points  [x] current VTE                                           3  [] Thrombophilia                                        2  [] Lower limb paralysis                              2   [] Current Cancer                                       2   [x] Immobilization > 24 hrs                        1  [] ICU/CCU stay > 24 hours                       1  [x] Age > 60                                                   1    IMPROVE VTE Score: 5  on eliquis for b/l LE DVT  no need for GI ppx. C/w Simvastatin 20 mg   - F/u lipid panel

## 2019-06-11 NOTE — DISCHARGE NOTE PROVIDER - CARE PROVIDER_API CALL
Mustapha Bell)  Orthopaedic Surgery  [id number] [place] [address]  Phone: [phone]  Fax: [phone]  Follow Up Time:

## 2019-06-11 NOTE — PROGRESS NOTE ADULT - PROBLEM SELECTOR PLAN 4
Cr 1.68 on admission, improving  - Unknown baseline   - C/w IVF   - F/u BMP qd CT scan showing pulm nodules  - F/u for repeat CT scan in 6 months

## 2019-06-11 NOTE — PROGRESS NOTE ADULT - ATTENDING COMMENTS
Patient seen and examined. Patient's history, vitals, labs, imaging studies reviewed. Discussed with above resident, agree with note with edits, educated on the diagnosis and management of above medical conditions. PT recommends GABBY. Plan of care discussed with patient, and agrees, all questions answered.   Maribel Trimble MD

## 2019-06-11 NOTE — PROGRESS NOTE ADULT - PROBLEM SELECTOR PLAN 9
Pt is full code   Next of kin is daughter Shyla [phone] IMPROVE VTE Individual Risk Assessment    RISK                                                          Points  [x] current VTE                                           3  [] Thrombophilia                                        2  [] Lower limb paralysis                              2   [] Current Cancer                                       2   [x] Immobilization > 24 hrs                        1  [] ICU/CCU stay > 24 hours                       1  [x] Age > 60                                                   1    IMPROVE VTE Score: 5  on eliquis for b/l LE DVT  no need for GI ppx.

## 2019-06-12 LAB
ANION GAP SERPL CALC-SCNC: 7 MMOL/L — SIGNIFICANT CHANGE UP (ref 5–17)
BASOPHILS # BLD AUTO: 0.01 K/UL — SIGNIFICANT CHANGE UP (ref 0–0.2)
BASOPHILS NFR BLD AUTO: 0.2 % — SIGNIFICANT CHANGE UP (ref 0–2)
BUN SERPL-MCNC: 27 MG/DL — HIGH (ref 7–18)
CALCIUM SERPL-MCNC: 9.4 MG/DL — SIGNIFICANT CHANGE UP (ref 8.4–10.5)
CHLORIDE SERPL-SCNC: 101 MMOL/L — SIGNIFICANT CHANGE UP (ref 96–108)
CHOLEST SERPL-MCNC: 144 MG/DL — SIGNIFICANT CHANGE UP (ref 10–199)
CO2 SERPL-SCNC: 26 MMOL/L — SIGNIFICANT CHANGE UP (ref 22–31)
CREAT SERPL-MCNC: 1.48 MG/DL — HIGH (ref 0.5–1.3)
CRP SERPL-MCNC: 7.54 MG/DL — HIGH (ref 0–0.4)
EOSINOPHIL # BLD AUTO: 0.26 K/UL — SIGNIFICANT CHANGE UP (ref 0–0.5)
EOSINOPHIL NFR BLD AUTO: 4.6 % — SIGNIFICANT CHANGE UP (ref 0–6)
ERYTHROCYTE [SEDIMENTATION RATE] IN BLOOD: 81 MM/HR — HIGH (ref 0–20)
GLUCOSE BLDC GLUCOMTR-MCNC: 141 MG/DL — HIGH (ref 70–99)
GLUCOSE BLDC GLUCOMTR-MCNC: 156 MG/DL — HIGH (ref 70–99)
GLUCOSE BLDC GLUCOMTR-MCNC: 163 MG/DL — HIGH (ref 70–99)
GLUCOSE BLDC GLUCOMTR-MCNC: 241 MG/DL — HIGH (ref 70–99)
GLUCOSE SERPL-MCNC: 146 MG/DL — HIGH (ref 70–99)
HCT VFR BLD CALC: 37.7 % — LOW (ref 39–50)
HDLC SERPL-MCNC: 59 MG/DL — SIGNIFICANT CHANGE UP
HGB BLD-MCNC: 12.4 G/DL — LOW (ref 13–17)
IMM GRANULOCYTES NFR BLD AUTO: 0.5 % — SIGNIFICANT CHANGE UP (ref 0–1.5)
LIPID PNL WITH DIRECT LDL SERPL: 71 MG/DL — SIGNIFICANT CHANGE UP
LYMPHOCYTES # BLD AUTO: 1.68 K/UL — SIGNIFICANT CHANGE UP (ref 1–3.3)
LYMPHOCYTES # BLD AUTO: 29.7 % — SIGNIFICANT CHANGE UP (ref 13–44)
MCHC RBC-ENTMCNC: 30.4 PG — SIGNIFICANT CHANGE UP (ref 27–34)
MCHC RBC-ENTMCNC: 32.9 GM/DL — SIGNIFICANT CHANGE UP (ref 32–36)
MCV RBC AUTO: 92.4 FL — SIGNIFICANT CHANGE UP (ref 80–100)
MONOCYTES # BLD AUTO: 0.49 K/UL — SIGNIFICANT CHANGE UP (ref 0–0.9)
MONOCYTES NFR BLD AUTO: 8.7 % — SIGNIFICANT CHANGE UP (ref 2–14)
NEUTROPHILS # BLD AUTO: 3.18 K/UL — SIGNIFICANT CHANGE UP (ref 1.8–7.4)
NEUTROPHILS NFR BLD AUTO: 56.3 % — SIGNIFICANT CHANGE UP (ref 43–77)
NRBC # BLD: 0 /100 WBCS — SIGNIFICANT CHANGE UP (ref 0–0)
PLATELET # BLD AUTO: 258 K/UL — SIGNIFICANT CHANGE UP (ref 150–400)
POTASSIUM SERPL-MCNC: 4.3 MMOL/L — SIGNIFICANT CHANGE UP (ref 3.5–5.3)
POTASSIUM SERPL-SCNC: 4.3 MMOL/L — SIGNIFICANT CHANGE UP (ref 3.5–5.3)
RBC # BLD: 4.08 M/UL — LOW (ref 4.2–5.8)
RBC # FLD: 12.4 % — SIGNIFICANT CHANGE UP (ref 10.3–14.5)
RHEUMATOID FACT SERPL-ACNC: 12 IU/ML — SIGNIFICANT CHANGE UP (ref 0–13)
SODIUM SERPL-SCNC: 134 MMOL/L — LOW (ref 135–145)
TOTAL CHOLESTEROL/HDL RATIO MEASUREMENT: 2.4 RATIO — LOW (ref 3.4–9.6)
TRIGL SERPL-MCNC: 72 MG/DL — SIGNIFICANT CHANGE UP (ref 10–149)
WBC # BLD: 5.65 K/UL — SIGNIFICANT CHANGE UP (ref 3.8–10.5)
WBC # FLD AUTO: 5.65 K/UL — SIGNIFICANT CHANGE UP (ref 3.8–10.5)

## 2019-06-12 PROCEDURE — 99232 SBSQ HOSP IP/OBS MODERATE 35: CPT

## 2019-06-12 RX ORDER — OXYCODONE HYDROCHLORIDE 5 MG/1
5 TABLET ORAL EVERY 8 HOURS
Refills: 0 | Status: DISCONTINUED | OUTPATIENT
Start: 2019-06-12 | End: 2019-06-13

## 2019-06-12 RX ORDER — GABAPENTIN 400 MG/1
1 CAPSULE ORAL
Qty: 0 | Refills: 0 | DISCHARGE
Start: 2019-06-12

## 2019-06-12 RX ORDER — POLYETHYLENE GLYCOL 3350 17 G/17G
17 POWDER, FOR SOLUTION ORAL
Qty: 0 | Refills: 0 | DISCHARGE
Start: 2019-06-12

## 2019-06-12 RX ORDER — CYCLOBENZAPRINE HYDROCHLORIDE 10 MG/1
10 TABLET, FILM COATED ORAL THREE TIMES A DAY
Refills: 0 | Status: DISCONTINUED | OUTPATIENT
Start: 2019-06-12 | End: 2019-06-13

## 2019-06-12 RX ORDER — LIDOCAINE 4 G/100G
1 CREAM TOPICAL
Qty: 0 | Refills: 0 | DISCHARGE
Start: 2019-06-12

## 2019-06-12 RX ORDER — SODIUM CHLORIDE 9 MG/ML
1000 INJECTION INTRAMUSCULAR; INTRAVENOUS; SUBCUTANEOUS
Refills: 0 | Status: DISCONTINUED | OUTPATIENT
Start: 2019-06-12 | End: 2019-06-12

## 2019-06-12 RX ORDER — APIXABAN 2.5 MG/1
2 TABLET, FILM COATED ORAL
Qty: 0 | Refills: 0 | DISCHARGE
Start: 2019-06-12

## 2019-06-12 RX ORDER — GABAPENTIN 400 MG/1
300 CAPSULE ORAL EVERY 8 HOURS
Refills: 0 | Status: DISCONTINUED | OUTPATIENT
Start: 2019-06-12 | End: 2019-06-13

## 2019-06-12 RX ORDER — DOCUSATE SODIUM 100 MG
1 CAPSULE ORAL
Qty: 0 | Refills: 0 | DISCHARGE
Start: 2019-06-12

## 2019-06-12 RX ORDER — CYCLOBENZAPRINE HYDROCHLORIDE 10 MG/1
1 TABLET, FILM COATED ORAL
Qty: 0 | Refills: 0 | DISCHARGE
Start: 2019-06-12

## 2019-06-12 RX ORDER — SODIUM CHLORIDE 9 MG/ML
1000 INJECTION INTRAMUSCULAR; INTRAVENOUS; SUBCUTANEOUS
Refills: 0 | Status: DISCONTINUED | OUTPATIENT
Start: 2019-06-12 | End: 2019-06-13

## 2019-06-12 RX ADMIN — APIXABAN 10 MILLIGRAM(S): 2.5 TABLET, FILM COATED ORAL at 05:41

## 2019-06-12 RX ADMIN — TAMSULOSIN HYDROCHLORIDE 0.4 MILLIGRAM(S): 0.4 CAPSULE ORAL at 22:23

## 2019-06-12 RX ADMIN — LIDOCAINE 1 PATCH: 4 CREAM TOPICAL at 23:30

## 2019-06-12 RX ADMIN — Medication 1 APPLICATION(S): at 22:23

## 2019-06-12 RX ADMIN — Medication 1: at 08:40

## 2019-06-12 RX ADMIN — CYCLOBENZAPRINE HYDROCHLORIDE 10 MILLIGRAM(S): 10 TABLET, FILM COATED ORAL at 22:22

## 2019-06-12 RX ADMIN — GABAPENTIN 300 MILLIGRAM(S): 400 CAPSULE ORAL at 05:41

## 2019-06-12 RX ADMIN — APIXABAN 10 MILLIGRAM(S): 2.5 TABLET, FILM COATED ORAL at 17:44

## 2019-06-12 RX ADMIN — LIDOCAINE 1 PATCH: 4 CREAM TOPICAL at 11:45

## 2019-06-12 RX ADMIN — SODIUM CHLORIDE 50 MILLILITER(S): 9 INJECTION INTRAMUSCULAR; INTRAVENOUS; SUBCUTANEOUS at 11:44

## 2019-06-12 RX ADMIN — POLYETHYLENE GLYCOL 3350 17 GRAM(S): 17 POWDER, FOR SOLUTION ORAL at 11:48

## 2019-06-12 RX ADMIN — LIDOCAINE 1 PATCH: 4 CREAM TOPICAL at 19:35

## 2019-06-12 RX ADMIN — Medication 100 MILLIGRAM(S): at 05:41

## 2019-06-12 RX ADMIN — LATANOPROST 1 DROP(S): 0.05 SOLUTION/ DROPS OPHTHALMIC; TOPICAL at 22:22

## 2019-06-12 RX ADMIN — SIMVASTATIN 20 MILLIGRAM(S): 20 TABLET, FILM COATED ORAL at 23:55

## 2019-06-12 RX ADMIN — Medication 2: at 11:50

## 2019-06-12 RX ADMIN — OXYCODONE HYDROCHLORIDE 5 MILLIGRAM(S): 5 TABLET ORAL at 17:42

## 2019-06-12 RX ADMIN — Medication 1: at 17:35

## 2019-06-12 RX ADMIN — GABAPENTIN 300 MILLIGRAM(S): 400 CAPSULE ORAL at 22:21

## 2019-06-12 RX ADMIN — Medication 100 MILLIGRAM(S): at 17:44

## 2019-06-12 NOTE — PROGRESS NOTE ADULT - PROBLEM SELECTOR PLAN 1
P/w b/l LE swelling and mild pain of the leg from the knee down  - LE doppler showed non-occlusive thrombus in bilateral superficial femoral and popliteal veins  - HD stable, no SOB  - -ve jemima's sign  - D dimer of 4000  - Changed heparin drip to eliquis  - Elevation of the LLE above the level of the heart  - Tumor markers negative  - CTA negative for PE  - Echo wnl  - ESR mildly elevated  - Heme Dr Nieto  - Medically stable for dc pending GABBY and auth.

## 2019-06-12 NOTE — PROGRESS NOTE ADULT - SUBJECTIVE AND OBJECTIVE BOX
PGY1 Note discussed with Supervising Resident and Primary Attending.    Patient is a 78y old  Male who presents with a chief complaint of B/L LE DVT (11 Jun 2019 17:14)      INTERVAL HPI/OVERNIGHT EVENTS:  No acute overnight events. Patient seen and examined at bedside. Patient is still reporting severe back pain, meds have just been adjusted, will continue to monitor while patient is in hospital. Patient denies nausea, vomiting, diarrhea, chest pain, SOB, headache. Medically stable for dc pending GABBY and auth.    MEDICATIONS  (STANDING):  apixaban 10 milliGRAM(s) Oral every 12 hours  docusate sodium 100 milliGRAM(s) Oral two times a day  gabapentin 300 milliGRAM(s) Oral every 12 hours  insulin lispro (HumaLOG) corrective regimen sliding scale   SubCutaneous Before meals and at bedtime  latanoprost 0.005% Ophthalmic Solution 1 Drop(s) Both EYES at bedtime  levoFLOXacin IVPB      levoFLOXacin IVPB 500 milliGRAM(s) IV Intermittent every 24 hours  lidocaine   Patch 1 Patch Transdermal daily  polyethylene glycol 3350 17 Gram(s) Oral daily  simvastatin 20 milliGRAM(s) Oral at bedtime  tamsulosin 0.4 milliGRAM(s) Oral at bedtime    MEDICATIONS  (PRN):  cyclobenzaprine 10 milliGRAM(s) Oral three times a day PRN Muscle Spasm  traMADol 50 milliGRAM(s) Oral every 8 hours PRN Moderate Pain (4 - 6)      Allergies    No Known Allergies    Intolerances        REVIEW OF SYSTEMS:  * General: denies chills, fatigue  * Eyes: denies vision changes  * Respiratory: denies cough, SOB, wheezing  * Cardio: denies chest pain, palpitations  * GI: denies abd pain, nausea, vomiting, diarrhea  * : denies dysuria  * Neuro: denies headaches, numbness, weakness  * MSK: reports severe back pain; denies joint pain  * Skin: denies itching, rashes    Vital Signs Last 24 Hrs  T(C): 36.7 (12 Jun 2019 05:15), Max: 36.7 (12 Jun 2019 05:15)  T(F): 98 (12 Jun 2019 05:15), Max: 98 (12 Jun 2019 05:15)  HR: 73 (12 Jun 2019 05:15) (73 - 84)  BP: 131/74 (12 Jun 2019 05:15) (116/66 - 140/71)  BP(mean): --  RR: 17 (12 Jun 2019 05:15) (15 - 17)  SpO2: 97% (12 Jun 2019 05:15) (97% - 100%)    PHYSICAL EXAM:  * General: no acute distress, well-nourished, well-developed  * HEENT: atraumatic, normocephalic; moist mucus membranes; supple neck; no JVD  * CN: EOMI, PERRL  * Respiratory: cta b/l; no wheezes, rales, rhonchi  * Cardio: RRR; no appreciable murmurs, rubs, gallops  * Abd: soft, nontender, nondistended, +BS  * Neuro: AAOx3; strength 5/5; sensation intact throughout  * Extremities: no clubbing, cyanosis, edema  * Skin: no rashes    LABS:                          12.4   5.65  )-----------( 258      ( 12 Jun 2019 07:12 )             37.7     06-12    134<L>  |  101  |  27<H>  ----------------------------<  146<H>  4.3   |  26  |  1.48<H>    Ca    9.4      12 Jun 2019 07:12      PTT - ( 11 Jun 2019 06:03 )  PTT:36.5 sec    CAPILLARY BLOOD GLUCOSE      POCT Blood Glucose.: 156 mg/dL (12 Jun 2019 08:24)  POCT Blood Glucose.: 207 mg/dL (11 Jun 2019 21:21)  POCT Blood Glucose.: 117 mg/dL (11 Jun 2019 16:47)  POCT Blood Glucose.: 150 mg/dL (11 Jun 2019 12:22)      RADIOLOGY & ADDITIONAL TESTS:   no new imaging    Consultant(s) Notes Reviewed: yes PGY1 Note discussed with Supervising Resident and Primary Attending.    Patient is a 78 year old male who presents with a chief complaint of B/L LE DVT (11 Jun 2019 17:14)      INTERVAL HPI/OVERNIGHT EVENTS:  No acute overnight events. Patient seen and examined at bedside. Patient is still reporting severe back pain, meds have just been adjusted, will continue to monitor while patient is in hospital. Patient denies nausea, vomiting, diarrhea, chest pain, SOB, headache. Medically stable for dc pending GABBY and auth.    MEDICATIONS  (STANDING):  apixaban 10 milliGRAM(s) Oral every 12 hours  docusate sodium 100 milliGRAM(s) Oral two times a day  gabapentin 300 milliGRAM(s) Oral every 12 hours  insulin lispro (HumaLOG) corrective regimen sliding scale   SubCutaneous Before meals and at bedtime  latanoprost 0.005% Ophthalmic Solution 1 Drop(s) Both EYES at bedtime  levoFLOXacin IVPB      levoFLOXacin IVPB 500 milliGRAM(s) IV Intermittent every 24 hours  lidocaine   Patch 1 Patch Transdermal daily  polyethylene glycol 3350 17 Gram(s) Oral daily  simvastatin 20 milliGRAM(s) Oral at bedtime  tamsulosin 0.4 milliGRAM(s) Oral at bedtime    MEDICATIONS  (PRN):  cyclobenzaprine 10 milliGRAM(s) Oral three times a day PRN Muscle Spasm  traMADol 50 milliGRAM(s) Oral every 8 hours PRN Moderate Pain (4 - 6)      Allergies    No Known Allergies          REVIEW OF SYSTEMS:  * General: denies chills, fatigue  * Eyes: denies vision changes  * Respiratory: denies cough, SOB, wheezing  * Cardio: denies chest pain, palpitations  * GI: denies abd pain, nausea, vomiting, diarrhea  * : denies dysuria  * Neuro: denies headaches, numbness, weakness  * MSK: reports severe back pain; denies joint pain  * Skin: denies itching, rashes  * Psych: no depression, no anxiety    Vital Signs Last 24 Hrs  T(C): 36.7 (12 Jun 2019 05:15), Max: 36.7 (12 Jun 2019 05:15)  T(F): 98 (12 Jun 2019 05:15), Max: 98 (12 Jun 2019 05:15)  HR: 73 (12 Jun 2019 05:15) (73 - 84)  BP: 131/74 (12 Jun 2019 05:15) (116/66 - 140/71)  BP(mean): --  RR: 17 (12 Jun 2019 05:15) (15 - 17)  SpO2: 97% (12 Jun 2019 05:15) (97% - 100%)    PHYSICAL EXAM:  * General: no acute distress, well-nourished, well-developed  * HEENT: atraumatic, normocephalic; moist mucus membranes; supple neck; no JVD  * CN: EOMI, PERRL  * Respiratory: cta b/l; no wheezes, rales, rhonchi  * Cardio: RRR; no appreciable murmurs, rubs, gallops  * Abd: soft, nontender, nondistended, +BS  * Neuro: AAOx3; strength 5/5; sensation intact throughout  * Extremities: no clubbing, cyanosis, edema  * Skin: no rashes    LABS:                          12.4   5.65  )-----------( 258      ( 12 Jun 2019 07:12 )             37.7     06-12    134<L>  |  101  |  27<H>  ----------------------------<  146<H>  4.3   |  26  |  1.48<H>    Ca    9.4      12 Jun 2019 07:12      PTT - ( 11 Jun 2019 06:03 )  PTT:36.5 sec    CAPILLARY BLOOD GLUCOSE      POCT Blood Glucose.: 156 mg/dL (12 Jun 2019 08:24)  POCT Blood Glucose.: 207 mg/dL (11 Jun 2019 21:21)  POCT Blood Glucose.: 117 mg/dL (11 Jun 2019 16:47)  POCT Blood Glucose.: 150 mg/dL (11 Jun 2019 12:22)      RADIOLOGY & ADDITIONAL TESTS:   no new imaging    Consultant(s) Notes Reviewed: yes

## 2019-06-12 NOTE — PROGRESS NOTE ADULT - PROBLEM SELECTOR PLAN 10
Pt is full code   Next of kin is daughter Shyla [phone]
Pt is full code   Next of kin is daughter Shyla [phone]

## 2019-06-12 NOTE — PROGRESS NOTE ADULT - PROBLEM SELECTOR PLAN 3
Recently treated with levaquin ( ms)  - C/w levaquin for 2 days more to complete 7 days course  - Afebrile, no leukocytosis  - Blood cx negative

## 2019-06-12 NOTE — PROGRESS NOTE ADULT - SUBJECTIVE AND OBJECTIVE BOX
NP Note discussed with  Primary Attending    CC: lower back pain    Patient is a 78y old  Male who presents with a chief complaint of sob and lower back pain. Pain worsens with movement.  Pt is oob to chair but states that pain is still present and worsens at night.  No nausea or vomiting.  ESR elevated and CRP elevated.  Antibodies sent off.  Pt is ambulating with cane.        INTERVAL HPI/OVERNIGHT EVENTS: no new complaints    MEDICATIONS  (STANDING):  apixaban 10 milliGRAM(s) Oral every 12 hours  cyclobenzaprine 10 milliGRAM(s) Oral three times a day  docusate sodium 100 milliGRAM(s) Oral two times a day  gabapentin 300 milliGRAM(s) Oral every 8 hours  insulin lispro (HumaLOG) corrective regimen sliding scale   SubCutaneous Before meals and at bedtime  latanoprost 0.005% Ophthalmic Solution 1 Drop(s) Both EYES at bedtime  levoFLOXacin IVPB      levoFLOXacin IVPB 500 milliGRAM(s) IV Intermittent every 24 hours  lidocaine   Patch 1 Patch Transdermal daily  polyethylene glycol 3350 17 Gram(s) Oral daily  simvastatin 20 milliGRAM(s) Oral at bedtime  sodium chloride 0.9%. 1000 milliLiter(s) (50 mL/Hr) IV Continuous <Continuous>  tamsulosin 0.4 milliGRAM(s) Oral at bedtime    MEDICATIONS  (PRN):  oxyCODONE    IR 5 milliGRAM(s) Oral every 8 hours PRN Severe Pain (7 - 10)      __________________________________________________  REVIEW OF SYSTEMS:    CONSTITUTIONAL: No fever,   EYES: no acute visual disturbances  NECK: No pain or stiffness  RESPIRATORY: No cough; No shortness of breath  CARDIOVASCULAR: No chest pain, no palpitations  GASTROINTESTINAL: No pain. No nausea or vomiting; No diarrhea   NEUROLOGICAL: No headache or numbness, no tremors  MUSCULOSKELETAL: + lower back pain + pain down legs  GENITOURINARY: no dysuria, no frequency, no hesitancy  PSYCHIATRY: no depression , no anxiety  ALL OTHER  ROS negative        Vital Signs Last 24 Hrs  T(C): 36.6 (12 Jun 2019 14:35), Max: 36.7 (12 Jun 2019 05:15)  T(F): 97.8 (12 Jun 2019 14:35), Max: 98 (12 Jun 2019 05:15)  HR: 76 (12 Jun 2019 14:35) (73 - 84)  BP: 114/90 (12 Jun 2019 14:35) (114/90 - 131/74)  BP(mean): --  RR: 18 (12 Jun 2019 14:35) (17 - 18)  SpO2: 98% (12 Jun 2019 14:35) (97% - 100%)    ________________________________________________  PHYSICAL EXAM:  GENERAL: NAD  CHEST/LUNG: Clear to auscultation bilaterally with good air entry   HEART: S1 S2  regular; no murmurs, gallops or rubs  ABDOMEN: Soft, Nontender, Nondistended; Bowel sounds present  EXTREMITIES: no cyanosis; no edema; no calf tenderness  SKIN: warm and dry; no rash  NERVOUS SYSTEM:  Awake and alert; Oriented  to place, person and time ; no new deficits  MUSCULOSKELETAL: + lumbar spine tenderness + decreased rom   _________________________________________________  LABS:                        12.4   5.65  )-----------( 258      ( 12 Jun 2019 07:12 )             37.7     06-12    134<L>  |  101  |  27<H>  ----------------------------<  146<H>  4.3   |  26  |  1.48<H>    Ca    9.4      12 Jun 2019 07:12      PTT - ( 11 Jun 2019 06:03 )  PTT:36.5 sec    CAPILLARY BLOOD GLUCOSE      POCT Blood Glucose.: 163 mg/dL (12 Jun 2019 16:39)  POCT Blood Glucose.: 241 mg/dL (12 Jun 2019 11:39)  POCT Blood Glucose.: 156 mg/dL (12 Jun 2019 08:24)  POCT Blood Glucose.: 207 mg/dL (11 Jun 2019 21:21)  POCT Blood Glucose.: 117 mg/dL (11 Jun 2019 16:47)        RADIOLOGY & ADDITIONAL TESTS:    Imaging Personally Reviewed:  YES/NO    Consultant(s) Notes Reviewed:   YES/ No    Care Discussed with Consultants :     Plan of care was discussed with patient and /or primary care giver; all questions and concerns were addressed and care was aligned with patient's wishes.

## 2019-06-12 NOTE — PROGRESS NOTE ADULT - PROVIDER SPECIALTY LIST ADULT
Heme/Onc
Heme/Onc
Internal Medicine
Internal Medicine
Neurology
Pain Medicine
Heme/Onc
Internal Medicine
Internal Medicine

## 2019-06-12 NOTE — PROGRESS NOTE ADULT - PROBLEM SELECTOR PROBLEM 1
Acute deep vein thrombosis (DVT) of femoral vein of both lower extremities
Acute midline low back pain with bilateral sciatica
Acute deep vein thrombosis (DVT) of femoral vein of both lower extremities

## 2019-06-12 NOTE — PROGRESS NOTE ADULT - SUBJECTIVE AND OBJECTIVE BOX
INTERVAL HPI/OVERNIGHT EVENTS: Able to get out of bed and walk with a cane today; he was unable to sleep last night because of pain    HEALTH ISSUES - PROBLEM Dx:  Acute constipation: Acute constipation  Acute midline low back pain with bilateral sciatica: Acute midline low back pain with bilateral sciatica  Pulmonary nodules: Pulmonary nodules  Spondylosis: Spondylosis  Pleural nodule: Pleural nodule  Low back pain: Low back pain  Dyspnea: Dyspnea  Femoropopliteal thrombophlebitis  Goals of care, counseling/discussion: Goals of care, counseling/discussion  Prophylactic measure: Prophylactic measure  Hypercholesterolemia: Hypercholesterolemia  Diabetes: Diabetes  HTN (hypertension): HTN (hypertension)  CARINA (acute kidney injury): CARINA (acute kidney injury)  Pneumonia: Pneumonia  Acute deep vein thrombosis (DVT) of femoral vein of both lower extremities: Acute deep vein thrombosis (DVT) of femoral vein of both lower extremities          MEDICATIONS  (STANDING):  apixaban 10 milliGRAM(s) Oral every 12 hours  cyclobenzaprine 10 milliGRAM(s) Oral three times a day  docusate sodium 100 milliGRAM(s) Oral two times a day  gabapentin 300 milliGRAM(s) Oral every 8 hours  insulin lispro (HumaLOG) corrective regimen sliding scale   SubCutaneous Before meals and at bedtime  latanoprost 0.005% Ophthalmic Solution 1 Drop(s) Both EYES at bedtime  levoFLOXacin IVPB      levoFLOXacin IVPB 500 milliGRAM(s) IV Intermittent every 24 hours  lidocaine   Patch 1 Patch Transdermal daily  polyethylene glycol 3350 17 Gram(s) Oral daily  simvastatin 20 milliGRAM(s) Oral at bedtime  sodium chloride 0.9%. 1000 milliLiter(s) (50 mL/Hr) IV Continuous <Continuous>  tamsulosin 0.4 milliGRAM(s) Oral at bedtime    MEDICATIONS  (PRN):      Allergies    No Known Allergies    Intolerances        REVIEW OF SYSTEMS  REVIEW OF SYSTEMS:    CONSTITUTIONAL: No weakness, fatigue, malaise, fevers or chills, no weight change, appetite change  EYES: No visual changes; No double vision,  No vertigo, eye pain  Ears: no otalgia, no otorhea, no hearing loss, tinnitus  Nose: no epistaxis, rhinorrhea, post-discharge, sinus pressure  Throat: no throat pain, no oral lesions, tooth pain   NECK: No pain or stiffness  RESPIRATORY: No cough (productive or dry), wheezing, hemoptysis; No shortness of breath, orthnopnea, PND, [place], snoring,  CARDIOVASCULAR: No chest pain or palpitations, no leg edema, no claudication    GASTROINTESTINAL: No abdominal or epigastric pain. No nausea, vomiting, or hematemesis; No diarrhea or constipation. No melena or hematochezia.  GENITOURINARY: No dysuria, frequency, urgency or hematuria, no pelvic pain, urinary incontinence, urgency  Muscloskeletal: Back pain severe sharp and aching  NEUROLOGICAL: No numbness or weakness, headache, memory loss, seizures, dizziness, vertigo, syncope, ataxia  SKIN: No pruritis, rashes, lesions or new moles  Psych: No anxiety, sadness, insomnia, suicide thoughts  Endocrine: No Heat or Cold intolerance, polydipsia, polyphagia  Heme/Lymph: no LN enlargement, no easy bruising or bleeding             Vital Signs Last 24 Hrs  T(C): 36.6 (12 Jun 2019 14:35), Max: 36.7 (12 Jun 2019 05:15)  T(F): 97.8 (12 Jun 2019 14:35), Max: 98 (12 Jun 2019 05:15)  HR: 76 (12 Jun 2019 14:35) (73 - 84)  BP: 114/90 (12 Jun 2019 14:35) (114/90 - 131/74)  BP(mean): --  RR: 18 (12 Jun 2019 14:35) (17 - 18)  SpO2: 98% (12 Jun 2019 14:35) (97% - 100%)    PHYSICAL EXAM:  Constitutional: awake and alert.  HEENT: PERRLA, EOMI,   Neck: Supple.  Respiratory: Breath sounds are clear bilaterally  Cardiovascular: S1 and S2, regular / irregular rhythm  Gastrointestinal: soft, nontender  Extremities:  no edema  Vascular: Caritid Bruit - no  Musculoskeletal: no joint swelling/tenderness, no abnormal movements  Skin: No rashes    Neurological exam:  HF: A x O x 3. Appropriately interactive, normal affect. Speech fluent, No Aphasia or paraphasic errors. Naming /repetition intact   CN: ANOOP, EOMI, VFF, facial sensation normal, no NLFD, tongue midline, Palate moves equally, SCM equal bilaterally  Motor: No pronator drift, Strength 5/5 in all 4 ext, normal bulk and tone, no tremor, rigidity or bradykinesia.    Sens: Intact to light touch / PP/ VS/ JS    Reflexes: Symmetric and normal . BJ 2+, BR 2+, KJ 2+, AJ 2+, downgoing toes b/l  Coord:  No FNFA, dysmetria, SONJA intact   Gait/Balance: Able to stand and walk trendlenberg fashion with a cane a few steps.  Bilateral SLRT -              LABS:                        12.4   5.65  )-----------( 258      ( 12 Jun 2019 07:12 )             37.7     06-12    134<L>  |  101  |  27<H>  ----------------------------<  146<H>  4.3   |  26  |  1.48<H>    Ca    9.4      12 Jun 2019 07:12      PTT - ( 11 Jun 2019 06:03 )  PTT:36.5 sec      RADIOLOGY & ADDITIONAL TESTS:  < from: MR Lumbar Spine No Cont (06.11.19 @ 11:32) >  EXAM:  MR SPINE LUMBAR                            PROCEDURE DATE:  06/11/2019          INTERPRETATION:  MRI lumbar spine without contrast    History back pain and weight loss    Limited comparison made with CT from 05/23/17    There is moderately severe upper lumbar dextroscoliosis. There is no   compression deformity. There is degenerative disc change and dorsal disc   osteophyte complex mildly deforming the ventral spinal cord with overall   mild spinal stenosis at T10-11. Dorsal osteophyte encroaches on and is   not displace or deform the spinal cord at T11-12.    There is ankylosis at T12-L1 and L1-L2 without dorsal bulging or ridging   or spinal stenosis    There is severe disc space narrowing with increased T2 signal in the   endplates and disc at L2-L3. There is no rudy endplate destruction There   is mild dorsal osteophyte with pronounced far lateral osteophyte. There   is severe bilateral foraminal stenosis and mild central canal stenosis    . There is ankylosis of the L3-L4 disc space. There is severe facet   arthropathy on the right. Far lateral osteophyte results in mild   right-sided foraminal stenosis without central canal stenosis    There is grade 1 degenerative spondylolisthesis related to severe   right-sided facet arthropathy at L4-L5. There is mild right-sided   foraminal stenosis without central canal stenosis.    There is severe degenerative disc change at L5-S1 with minimal   anterolisthesis related to severe right and moderate left facet   arthropathy without significant spinal canal or foraminal stenosis    IMPRESSION:    Severe spondylosis related to scoliosis with multilevel ankylosis.   Intradiscal and endplate signal change at L2-L3, most likely   degenerative. Although discitis could be considered in the appropriate   clinical setting.    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      NANI AIKEN M.D., ATTENDING RADIOLOGIST  This document has been electronically signed. Jun 11 2019  1:13PM    < end of copied text >

## 2019-06-12 NOTE — PROGRESS NOTE ADULT - SUBJECTIVE AND OBJECTIVE BOX
back pain continues  MRI  only showed arthritis  no sob or chest pain.    MEDICATIONS  (STANDING):  apixaban 10 milliGRAM(s) Oral every 12 hours  docusate sodium 100 milliGRAM(s) Oral two times a day  gabapentin 300 milliGRAM(s) Oral every 12 hours  insulin lispro (HumaLOG) corrective regimen sliding scale   SubCutaneous Before meals and at bedtime  latanoprost 0.005% Ophthalmic Solution 1 Drop(s) Both EYES at bedtime  levoFLOXacin IVPB      levoFLOXacin IVPB 500 milliGRAM(s) IV Intermittent every 24 hours  lidocaine   Patch 1 Patch Transdermal daily  polyethylene glycol 3350 17 Gram(s) Oral daily  simvastatin 20 milliGRAM(s) Oral at bedtime  sodium chloride 0.9%. 1000 milliLiter(s) (50 mL/Hr) IV Continuous <Continuous>  tamsulosin 0.4 milliGRAM(s) Oral at bedtime    MEDICATIONS  (PRN):  cyclobenzaprine 10 milliGRAM(s) Oral three times a day PRN Muscle Spasm  traMADol 50 milliGRAM(s) Oral every 8 hours PRN Moderate Pain (4 - 6)      Allergies    No Known Allergies    Intolerances        Vital Signs Last 24 Hrs  T(C): 36.7 (12 Jun 2019 05:15), Max: 36.7 (12 Jun 2019 05:15)  T(F): 98 (12 Jun 2019 05:15), Max: 98 (12 Jun 2019 05:15)  HR: 73 (12 Jun 2019 05:15) (73 - 84)  BP: 131/74 (12 Jun 2019 05:15) (116/66 - 131/74)  BP(mean): --  RR: 17 (12 Jun 2019 05:15) (15 - 17)  SpO2: 97% (12 Jun 2019 05:15) (97% - 100%)    PHYSICAL EXAM  General: adult in NAD  HEENT: clear oropharynx, anicteric sclera, pink conjunctiva  Neck: supple  CV: normal S1/S2 with no murmur rubs or gallops  Lungs: positive air movement b/l ant lungs,clear to auscultation, no wheezes, no rales  Abdomen: soft non-tender non-distended, no hepatosplenomegaly  Ext: no clubbing cyanosis or edema  Skin: no rashes and no petechiae  Neuro: alert and oriented X 4, no focal deficits  LABS:                          12.4   5.65  )-----------( 258      ( 12 Jun 2019 07:12 )             37.7         Mean Cell Volume : 92.4 fl  Mean Cell Hemoglobin : 30.4 pg  Mean Cell Hemoglobin Concentration : 32.9 gm/dL  Auto Neutrophil # : 3.18 K/uL  Auto Lymphocyte # : 1.68 K/uL  Auto Monocyte # : 0.49 K/uL  Auto Eosinophil # : 0.26 K/uL  Auto Basophil # : 0.01 K/uL  Auto Neutrophil % : 56.3 %  Auto Lymphocyte % : 29.7 %  Auto Monocyte % : 8.7 %  Auto Eosinophil % : 4.6 %  Auto Basophil % : 0.2 %    Serial CBC  Hematocrit 37.7  Hemoglobin 12.4  Plat 258  RBC 4.08  WBC 5.65  Serial CBC  Hematocrit 37.7  Hemoglobin 12.5  Plat 218  RBC 4.05  WBC 6.01  Serial CBC  Hematocrit 35.9  Hemoglobin 11.7  Plat 207  RBC 3.84  WBC 6.22  Serial CBC  Hematocrit 34.4  Hemoglobin 11.3  Plat 172  RBC 3.69  WBC 6.80  Serial CBC  Hematocrit 38.5  Hemoglobin 12.4  Plat 186  RBC 4.08  WBC 6.95    06-12    134<L>  |  101  |  27<H>  ----------------------------<  146<H>  4.3   |  26  |  1.48<H>    Ca    9.4      12 Jun 2019 07:12        PTT - ( 11 Jun 2019 06:03 )  PTT:36.5 sec              BLOOD SMEAR INTERPRETATION:       RADIOLOGY & ADDITIONAL STUDIES:

## 2019-06-12 NOTE — PROGRESS NOTE ADULT - ASSESSMENT
77 y/o M patient with a significant PMHx of Asthma, DM, HTN, Hypercholesterolemia, Glaucoma and PSHx of craniotomy for traumatic ICH (2008) presents to the ED with sob and sharp back pain . Patient reports he has been having worsening chronic back pain left greater than right for years, getting gradually worse for the past 3 months. Patient adds his back pain radiates to the b/l knees. Patient says he takes Tramadol for his back pain to no relief. Patient also adds he has been experiencing a cough for x 2 weeks and shortness of breath for the past week. Patient says he went to Dr. Puckett where he was prescribed Levaquin and methylprednisolone. Patient says he also uses Advair BID with no relief. Patient describes his phlegm as blood streaked and sometimes brown in color. Cough has improved with medication but SOB unchanged. Worse with exertion. PE revealed B/L leg swelling and mild pain of the leg from the knee down . LE Doppler showed : Nonocclusive thrombus in bilateral superficial femoral and popliteal veins .  Pt denies numbness and tingling of the L leg, Denies fevers and chills. No pain when walking. Denies sedentary lifestyle, denies recent travel. Denies any deviation from her normal everyday activities. Denies family history of blood clots in the legs or lungs Patient denies headache , chest pain, fever, incontinence, focal weakness, paresthesias, or any other complaints. NKDA.    EKG : NSR   UA : -ve     Admitted to Telemetry for management of DVT, r/o PE, and monitoring
79 y/o M patient with a significant PMHx of Asthma, DM, HTN, Hypercholesterolemia, Glaucoma and PSHx of craniotomy for traumatic ICH (2008) presents to the ED with sob and sharp back pain . Patient reports he has been having worsening chronic back pain left greater than right for years, getting gradually worse for the past 3 months. Patient adds his back pain radiates to the b/l knees. Patient says he takes Tramadol for his back pain to no relief. Patient also adds he has been experiencing a cough for x 2 weeks and shortness of breath for the past week. Patient says he went to Dr. Puckett where he was prescribed Levaquin and methylprednisolone. Patient says he also uses Advair BID with no relief. Patient describes his phlegm as blood streaked and sometimes brown in color. Cough has improved with medication but SOB unchanged. Worse with exertion. PE revealed B/L leg swelling and mild pain of the leg from the knee down . LE Doppler showed : Nonocclusive thrombus in bilateral superficial femoral and popliteal veins .  Pt denies numbness and tingling of the L leg, Denies fevers and chills. No pain when walking. Denies sedentary lifestyle, denies recent travel. Denies any deviation from her normal everyday activities. Denies family history of blood clots in the legs or lungs Patient denies headache , chest pain, fever, incontinence, focal weakness, paresthesias, or any other complaints. NKDA.    EKG : NSR   UA : -ve     Admitted to Telemetry for management of DVT, r/o PE, and monitoring
79 y/o M patient with a significant PMHx of Asthma, DM, HTN, Hypercholesterolemia, Glaucoma and PSHx of craniotomy for traumatic ICH (2008) presents to the ED with sob and sharp back pain . Patient reports he has been having worsening chronic back pain left greater than right for years, getting gradually worse for the past 3 months. Patient adds his back pain radiates to the b/l knees. Patient says he takes Tramadol for his back pain to no relief. Patient also adds he has been experiencing a cough for x 2 weeks and shortness of breath for the past week. Patient says he went to Dr. Puckett where he was prescribed Levaquin and methylprednisolone. Patient says he also uses Advair BID with no relief. Patient describes his phlegm as blood streaked and sometimes brown in color. Cough has improved with medication but SOB unchanged. Worse with exertion. PE revealed B/L leg swelling and mild pain of the leg from the knee down . LE Doppler showed : Nonocclusive thrombus in bilateral superficial femoral and popliteal veins .  Pt denies numbness and tingling of the L leg, Denies fevers and chills. No pain when walking. Denies sedentary lifestyle, denies recent travel. Denies any deviation from her normal everyday activities. Denies family history of blood clots in the legs or lungs Patient denies headache , chest pain, fever, incontinence, focal weakness, paresthesias, or any other complaints. NKDA.    EKG : NSR   UA : -ve     Admitted to Telemetry for management of DVT, r/o PE, and monitoring
Low back pain with radiation, but improving with anti-arthritic medications; Plan - await serological test results particularly repeat ESR because the lst ESR was significantly high and CRP, LIANE RF; continue current pain management protocol. Will follow
77 y/o M patient with a significant PMHx of Asthma, DM, HTN, Hypercholesterolemia, Glaucoma and PSHx of craniotomy for traumatic ICH (2008) presents to the ED with sob and sharp back pain . Patient reports he has been having worsening chronic back pain left greater than right for years, getting gradually worse for the past 3 months. Patient adds his back pain radiates to the b/l knees. Patient says he takes Tramadol for his back pain to no relief. Patient also adds he has been experiencing a cough for x 2 weeks and shortness of breath for the past week. Patient says he went to Dr. Puckett where he was prescribed Levaquin and methylprednisolone. Patient says he also uses Advair BID with no relief. Patient describes his phlegm as blood streaked and sometimes brown in color. Cough has improved with medication but SOB unchanged. Worse with exertion. PE revealed B/L leg swelling and mild pain of the leg from the knee down . LE Doppler showed : Nonocclusive thrombus in bilateral superficial femoral and popliteal veins .  Pt denies numbness and tingling of the L leg, Denies fevers and chills. No pain when walking. Denies sedentary lifestyle, denies recent travel. Denies any deviation from her normal everyday activities. Denies family history of blood clots in the legs or lungs Patient denies headache , chest pain, fever, incontinence, focal weakness, paresthesias, or any other complaints. NKDA.    EKG : NSR   UA : -ve     Admitted to Telemetry for management of DVT, r/o PE, and monitoring

## 2019-06-12 NOTE — PROGRESS NOTE ADULT - PROBLEM SELECTOR PLAN 1
- flexeril 10mg po q 8 hours - changed to atc  - gabapentin 300mg po q 8   - tramadol not effective - pt was on this at home   - transition to oxy 5mg po q 8 hours prn  - lidocaine patch  - neurology on case   - pending wilner  - stool softeners  - oob  - ortho spine f/u

## 2019-06-12 NOTE — PROGRESS NOTE ADULT - PROBLEM SELECTOR PLAN 9
IMPROVE VTE Individual Risk Assessment    RISK                                                          Points  [x] current VTE                                           3  [] Thrombophilia                                        2  [] Lower limb paralysis                              2   [] Current Cancer                                       2   [x] Immobilization > 24 hrs                        1  [] ICU/CCU stay > 24 hours                       1  [x] Age > 60                                                   1    IMPROVE VTE Score: 5  on eliquis for b/l LE DVT  no need for GI ppx.

## 2019-06-12 NOTE — PROGRESS NOTE ADULT - ATTENDING COMMENTS
Patient seen and examined. Patient's history, vitals, labs, imaging studies reviewed. Discussed with above resident, agree with note with edits, educated on the diagnosis and management of above medical conditions. Await GABBY placement. Plan of care discussed with patient, and agrees, all questions answered.   Maribel Trimble MD

## 2019-06-12 NOTE — PROGRESS NOTE ADULT - PROBLEM SELECTOR PLAN 2
P/w low back pain  - MRI showing spondylosis with ankylosis, discitis vs degeneration at L2-L3, and mild impingement at T10-T11  - No need for neuro or ortho treatment  - C/w gabapentin, flexeril prn, lidocaine patch, and tramadol prn  - Neuro Dr Fink  - Ortho Dr Bell  - Pain shannon Barron

## 2019-06-13 ENCOUNTER — TRANSCRIPTION ENCOUNTER (OUTPATIENT)
Age: 79
End: 2019-06-13

## 2019-06-13 VITALS — OXYGEN SATURATION: 99 % | SYSTOLIC BLOOD PRESSURE: 120 MMHG | HEART RATE: 74 BPM | DIASTOLIC BLOOD PRESSURE: 76 MMHG

## 2019-06-13 LAB
ALBUMIN SERPL ELPH-MCNC: 3.1 G/DL — LOW (ref 3.5–5)
ALP SERPL-CCNC: 65 U/L — SIGNIFICANT CHANGE UP (ref 40–120)
ALT FLD-CCNC: 32 U/L DA — SIGNIFICANT CHANGE UP (ref 10–60)
ANION GAP SERPL CALC-SCNC: 7 MMOL/L — SIGNIFICANT CHANGE UP (ref 5–17)
AST SERPL-CCNC: 20 U/L — SIGNIFICANT CHANGE UP (ref 10–40)
BILIRUB SERPL-MCNC: 0.2 MG/DL — SIGNIFICANT CHANGE UP (ref 0.2–1.2)
BUN SERPL-MCNC: 24 MG/DL — HIGH (ref 7–18)
CALCIUM SERPL-MCNC: 9.5 MG/DL — SIGNIFICANT CHANGE UP (ref 8.4–10.5)
CHLORIDE SERPL-SCNC: 103 MMOL/L — SIGNIFICANT CHANGE UP (ref 96–108)
CO2 SERPL-SCNC: 27 MMOL/L — SIGNIFICANT CHANGE UP (ref 22–31)
CREAT SERPL-MCNC: 1.37 MG/DL — HIGH (ref 0.5–1.3)
CULTURE RESULTS: SIGNIFICANT CHANGE UP
CULTURE RESULTS: SIGNIFICANT CHANGE UP
GLUCOSE BLDC GLUCOMTR-MCNC: 151 MG/DL — HIGH (ref 70–99)
GLUCOSE BLDC GLUCOMTR-MCNC: 151 MG/DL — HIGH (ref 70–99)
GLUCOSE BLDC GLUCOMTR-MCNC: 175 MG/DL — HIGH (ref 70–99)
GLUCOSE SERPL-MCNC: 188 MG/DL — HIGH (ref 70–99)
HCT VFR BLD CALC: 35.7 % — LOW (ref 39–50)
HGB BLD-MCNC: 11.7 G/DL — LOW (ref 13–17)
MCHC RBC-ENTMCNC: 30.5 PG — SIGNIFICANT CHANGE UP (ref 27–34)
MCHC RBC-ENTMCNC: 32.8 GM/DL — SIGNIFICANT CHANGE UP (ref 32–36)
MCV RBC AUTO: 93 FL — SIGNIFICANT CHANGE UP (ref 80–100)
NRBC # BLD: 0 /100 WBCS — SIGNIFICANT CHANGE UP (ref 0–0)
PLATELET # BLD AUTO: 258 K/UL — SIGNIFICANT CHANGE UP (ref 150–400)
POTASSIUM SERPL-MCNC: 4.2 MMOL/L — SIGNIFICANT CHANGE UP (ref 3.5–5.3)
POTASSIUM SERPL-SCNC: 4.2 MMOL/L — SIGNIFICANT CHANGE UP (ref 3.5–5.3)
PROT SERPL-MCNC: 7.8 G/DL — SIGNIFICANT CHANGE UP (ref 6–8.3)
RBC # BLD: 3.84 M/UL — LOW (ref 4.2–5.8)
RBC # FLD: 12.3 % — SIGNIFICANT CHANGE UP (ref 10.3–14.5)
SODIUM SERPL-SCNC: 137 MMOL/L — SIGNIFICANT CHANGE UP (ref 135–145)
SPECIMEN SOURCE: SIGNIFICANT CHANGE UP
SPECIMEN SOURCE: SIGNIFICANT CHANGE UP
WBC # BLD: 4.61 K/UL — SIGNIFICANT CHANGE UP (ref 3.8–10.5)
WBC # FLD AUTO: 4.61 K/UL — SIGNIFICANT CHANGE UP (ref 3.8–10.5)

## 2019-06-13 PROCEDURE — 86038 ANTINUCLEAR ANTIBODIES: CPT

## 2019-06-13 PROCEDURE — 97530 THERAPEUTIC ACTIVITIES: CPT

## 2019-06-13 PROCEDURE — 86147 CARDIOLIPIN ANTIBODY EA IG: CPT

## 2019-06-13 PROCEDURE — 86431 RHEUMATOID FACTOR QUANT: CPT

## 2019-06-13 PROCEDURE — 86146 BETA-2 GLYCOPROTEIN ANTIBODY: CPT

## 2019-06-13 PROCEDURE — 86592 SYPHILIS TEST NON-TREP QUAL: CPT

## 2019-06-13 PROCEDURE — 97163 PT EVAL HIGH COMPLEX 45 MIN: CPT

## 2019-06-13 PROCEDURE — 71275 CT ANGIOGRAPHY CHEST: CPT

## 2019-06-13 PROCEDURE — 82962 GLUCOSE BLOOD TEST: CPT

## 2019-06-13 PROCEDURE — 99285 EMERGENCY DEPT VISIT HI MDM: CPT | Mod: 25

## 2019-06-13 PROCEDURE — 85652 RBC SED RATE AUTOMATED: CPT

## 2019-06-13 PROCEDURE — 80061 LIPID PANEL: CPT

## 2019-06-13 PROCEDURE — 85610 PROTHROMBIN TIME: CPT

## 2019-06-13 PROCEDURE — 80053 COMPREHEN METABOLIC PANEL: CPT

## 2019-06-13 PROCEDURE — 80048 BASIC METABOLIC PNL TOTAL CA: CPT

## 2019-06-13 PROCEDURE — 86593 SYPHILIS TEST NON-TREP QUANT: CPT

## 2019-06-13 PROCEDURE — 85027 COMPLETE CBC AUTOMATED: CPT

## 2019-06-13 PROCEDURE — 87086 URINE CULTURE/COLONY COUNT: CPT

## 2019-06-13 PROCEDURE — 94640 AIRWAY INHALATION TREATMENT: CPT

## 2019-06-13 PROCEDURE — 93306 TTE W/DOPPLER COMPLETE: CPT

## 2019-06-13 PROCEDURE — 72148 MRI LUMBAR SPINE W/O DYE: CPT

## 2019-06-13 PROCEDURE — 82378 CARCINOEMBRYONIC ANTIGEN: CPT

## 2019-06-13 PROCEDURE — 86301 IMMUNOASSAY TUMOR CA 19-9: CPT

## 2019-06-13 PROCEDURE — 85379 FIBRIN DEGRADATION QUANT: CPT

## 2019-06-13 PROCEDURE — 86140 C-REACTIVE PROTEIN: CPT

## 2019-06-13 PROCEDURE — 74177 CT ABD & PELVIS W/CONTRAST: CPT

## 2019-06-13 PROCEDURE — 81001 URINALYSIS AUTO W/SCOPE: CPT

## 2019-06-13 PROCEDURE — 87040 BLOOD CULTURE FOR BACTERIA: CPT

## 2019-06-13 PROCEDURE — 96374 THER/PROPH/DIAG INJ IV PUSH: CPT | Mod: XU

## 2019-06-13 PROCEDURE — 86304 IMMUNOASSAY TUMOR CA 125: CPT

## 2019-06-13 PROCEDURE — 36415 COLL VENOUS BLD VENIPUNCTURE: CPT

## 2019-06-13 PROCEDURE — 93005 ELECTROCARDIOGRAM TRACING: CPT

## 2019-06-13 PROCEDURE — 93970 EXTREMITY STUDY: CPT

## 2019-06-13 PROCEDURE — 85730 THROMBOPLASTIN TIME PARTIAL: CPT

## 2019-06-13 PROCEDURE — 97116 GAIT TRAINING THERAPY: CPT

## 2019-06-13 PROCEDURE — 71045 X-RAY EXAM CHEST 1 VIEW: CPT

## 2019-06-13 PROCEDURE — 86780 TREPONEMA PALLIDUM: CPT

## 2019-06-13 RX ORDER — TRAMADOL HYDROCHLORIDE 50 MG/1
0 TABLET ORAL
Qty: 14 | Refills: 0 | DISCHARGE

## 2019-06-13 RX ORDER — OXYCODONE HYDROCHLORIDE 5 MG/1
1 TABLET ORAL
Qty: 90 | Refills: 0
Start: 2019-06-13 | End: 2019-07-12

## 2019-06-13 RX ADMIN — Medication 100 MILLIGRAM(S): at 07:03

## 2019-06-13 RX ADMIN — Medication 1: at 08:31

## 2019-06-13 RX ADMIN — APIXABAN 10 MILLIGRAM(S): 2.5 TABLET, FILM COATED ORAL at 07:03

## 2019-06-13 RX ADMIN — Medication 1 APPLICATION(S): at 07:04

## 2019-06-13 RX ADMIN — GABAPENTIN 300 MILLIGRAM(S): 400 CAPSULE ORAL at 07:03

## 2019-06-13 RX ADMIN — CYCLOBENZAPRINE HYDROCHLORIDE 10 MILLIGRAM(S): 10 TABLET, FILM COATED ORAL at 07:03

## 2019-06-13 NOTE — DISCHARGE NOTE NURSING/CASE MANAGEMENT/SOCIAL WORK - NSDCDPATPORTLINK_GEN_ALL_CORE
You can access the [facility] Patient Portal, offered by [facility], by registering with the following website: [email or web address]

## 2019-06-14 LAB — ANA TITR SER: NEGATIVE — SIGNIFICANT CHANGE UP

## 2019-06-18 LAB — T PALLIDUM AB TITR SER: POSITIVE

## 2019-06-21 NOTE — CHART NOTE - NSCHARTNOTEFT_GEN_A_CORE
Received call from lab today around 11:05AM, about patient's syphilis lab results. Patient was discharged to [facility] (). Patient is under to care of Dr. MAKEDA Saez there (/ [phone]).    Maribel Trimble MD Received call from lab today around 11:05AM, about patient's syphilis lab results. Patient was discharged to [facility] (). Patient is under to care of Dr. MAKEDA Saez there (/ [phone]/ [phone]). Dr. Saez informed about these results and he will speak to patient and decide about treatment as needed.  Maribel Trimble MD  6/21/2019

## 2020-01-22 NOTE — ED ADULT NURSE NOTE - CARDIO WDL
Alen Gibbs  1970  Diagnosis - Rotator cuff strain / Impingement syndrome shoulder left Physical Therapy Progress Note  01/22/20 0900   Signing Clinician's Name / Credentials   Signing clinician's name / credentials Lorenzo Wynn PT, DPT   Session Number   Session Number 3 (Start of Care Date - 1/9/2020)   Progress Note/Recertification   Progress Note Due Date 02/20/20   Progress Note Completed Date 01/22/20   Adult Goals   PT Ortho Eval Goals 1;2;3;4   Ortho Goal 1   Goal Identifier HEP   Goal Description Pt will be independent in HEP in order to achieve an maintain long term treatment goals.   Target Date 02/09/20   Date Met 01/22/20   Ortho Goal 2   Goal Identifier Lifting   Goal Description Pt will be able to lift chest compression equipment at work with a minimal increase in pain 1-2/10.  (Not met. Still on light duty.)   Target Date 02/20/20   Ortho Goal 3   Goal Identifier Sleeping   Goal Description Pt will be able to sleep through the night without waking up due to shoulder pain.   Target Date 02/20/20   Date Met 01/22/20   Ortho Goal 4   Goal Identifier Bathing   Goal Description Pt will be able to wash his back with a minimal increase in pain 1-2/10.   Target Date 02/20/20   Date Met 01/22/20   Subjective Report   Subjective Report Shoulder has been feeling pretty good over the last week. No pain. Tired of being on light duty at work. 0/10 pain right now.   Objective Measures   Objective Measures Objective Measure 1;Objective Measure 2;Objective Measure 3   Objective Measure 1   Objective Measure Strength   Details Flexion - 5/5 / Abduction - 5/5 / ER - 5/5 / IR - 5/5   Objective Measure 2   Objective Measure AROM   Details Flexion - 170 / Abduction - 171 / IR - T6   Objective Measure 3   Objective Measure PROM   Details ER - 85   Treatment Interventions   Interventions Therapeutic Procedure/Exercise   Therapeutic Procedure/exercise   Therapeutic Procedures: strength, endurance, ROM,  flexibillity minutes (05434) 28   Skilled Intervention Stretching and strengthening exercise education   Patient Response No increase in discomfort noted   Treatment Detail UBE with 2.5 level resistance / Band on wall 2x10 with YTB / PNF D2 2x15 with YTB / Low rows 2x15 with gray TB / Resisted IR in standing 2x15 with gray TB / Scaption 2x15 with 5# /    Education   Learner Patient   Readiness Acceptance   Method Booklet/handout;Explanation;Demonstration   Response Verbalizes Understanding;Demonstrates Understanding   Plan   Home program Low rows / Resisted IR in standing / PNF D2 / Band on wall / Wall walks for flexion x10 with 10 second holds / Rows x15 with black TB / Scaption x15 with 3# and x15 with 5# / ER in SL    Updates to plan of care PT on hold   Comments   Comments Pt is doing well in physical therapy and has met 3 of 4 goals. Pt has not met 4th and final goal as he is not back to regular duty at work. Pt's strength and ROM are excellent and he has had very little pain recently. Pt will be put on hold at this time in case of flare up in symptoms upon returning to regular duty.     Total Session Time   Timed Code Treatment Minutes 28   Total Treatment Time (sum of timed and untimed services) 28   AMBULATORY CLINICS ONLY-MEDICAL AND TREATMENT DIAGNOSIS   PT Diagnosis Left shoulder pain with flexibility and strength deficits     Referring Physician - Juan Manuel Lombardo MD   Normal rate, regular rhythm, normal S1, S2 heart sounds heard.

## 2021-03-11 ENCOUNTER — EMERGENCY (EMERGENCY)
Facility: HOSPITAL | Age: 81
LOS: 1 days | Discharge: HOME | End: 2021-03-11
Attending: STUDENT IN AN ORGANIZED HEALTH CARE EDUCATION/TRAINING PROGRAM
Payer: COMMERCIAL

## 2021-03-11 VITALS
OXYGEN SATURATION: 98 % | RESPIRATION RATE: 17 BRPM | SYSTOLIC BLOOD PRESSURE: 140 MMHG | DIASTOLIC BLOOD PRESSURE: 78 MMHG | TEMPERATURE: 99 F | HEART RATE: 79 BPM

## 2021-03-11 VITALS
OXYGEN SATURATION: 96 % | DIASTOLIC BLOOD PRESSURE: 80 MMHG | HEART RATE: 89 BPM | HEIGHT: 68 IN | TEMPERATURE: 100 F | RESPIRATION RATE: 16 BRPM | SYSTOLIC BLOOD PRESSURE: 148 MMHG

## 2021-03-11 DIAGNOSIS — Z98.890 OTHER SPECIFIED POSTPROCEDURAL STATES: Chronic | ICD-10-CM

## 2021-03-11 PROBLEM — J45.909 UNSPECIFIED ASTHMA, UNCOMPLICATED: Chronic | Status: ACTIVE | Noted: 2019-06-08

## 2021-03-11 PROBLEM — I10 ESSENTIAL (PRIMARY) HYPERTENSION: Chronic | Status: ACTIVE | Noted: 2019-06-08

## 2021-03-11 LAB
ALBUMIN SERPL ELPH-MCNC: 3.9 G/DL — SIGNIFICANT CHANGE UP (ref 3.5–5)
ALP SERPL-CCNC: 73 U/L — SIGNIFICANT CHANGE UP (ref 40–120)
ALT FLD-CCNC: 27 U/L DA — SIGNIFICANT CHANGE UP (ref 10–60)
ANION GAP SERPL CALC-SCNC: 3 MMOL/L — LOW (ref 5–17)
APTT BLD: 32.2 SEC — SIGNIFICANT CHANGE UP (ref 27.5–35.5)
AST SERPL-CCNC: 29 U/L — SIGNIFICANT CHANGE UP (ref 10–40)
BASOPHILS # BLD AUTO: 0.01 K/UL — SIGNIFICANT CHANGE UP (ref 0–0.2)
BASOPHILS NFR BLD AUTO: 0.2 % — SIGNIFICANT CHANGE UP (ref 0–2)
BILIRUB SERPL-MCNC: 0.5 MG/DL — SIGNIFICANT CHANGE UP (ref 0.2–1.2)
BUN SERPL-MCNC: 21 MG/DL — HIGH (ref 7–18)
CALCIUM SERPL-MCNC: 9.1 MG/DL — SIGNIFICANT CHANGE UP (ref 8.4–10.5)
CHLORIDE SERPL-SCNC: 111 MMOL/L — HIGH (ref 96–108)
CO2 SERPL-SCNC: 26 MMOL/L — SIGNIFICANT CHANGE UP (ref 22–31)
CREAT SERPL-MCNC: 1.46 MG/DL — HIGH (ref 0.5–1.3)
EOSINOPHIL # BLD AUTO: 0.07 K/UL — SIGNIFICANT CHANGE UP (ref 0–0.5)
EOSINOPHIL NFR BLD AUTO: 1.1 % — SIGNIFICANT CHANGE UP (ref 0–6)
GLUCOSE SERPL-MCNC: 66 MG/DL — LOW (ref 70–99)
HCT VFR BLD CALC: 39.3 % — SIGNIFICANT CHANGE UP (ref 39–50)
HGB BLD-MCNC: 12.9 G/DL — LOW (ref 13–17)
IMM GRANULOCYTES NFR BLD AUTO: 0.3 % — SIGNIFICANT CHANGE UP (ref 0–1.5)
INR BLD: 1.29 RATIO — HIGH (ref 0.88–1.16)
LIDOCAIN IGE QN: 177 U/L — SIGNIFICANT CHANGE UP (ref 73–393)
LYMPHOCYTES # BLD AUTO: 1.26 K/UL — SIGNIFICANT CHANGE UP (ref 1–3.3)
LYMPHOCYTES # BLD AUTO: 20.7 % — SIGNIFICANT CHANGE UP (ref 13–44)
MAGNESIUM SERPL-MCNC: 2.4 MG/DL — SIGNIFICANT CHANGE UP (ref 1.6–2.6)
MCHC RBC-ENTMCNC: 31.8 PG — SIGNIFICANT CHANGE UP (ref 27–34)
MCHC RBC-ENTMCNC: 32.8 GM/DL — SIGNIFICANT CHANGE UP (ref 32–36)
MCV RBC AUTO: 96.8 FL — SIGNIFICANT CHANGE UP (ref 80–100)
MONOCYTES # BLD AUTO: 0.76 K/UL — SIGNIFICANT CHANGE UP (ref 0–0.9)
MONOCYTES NFR BLD AUTO: 12.5 % — SIGNIFICANT CHANGE UP (ref 2–14)
NEUTROPHILS # BLD AUTO: 3.98 K/UL — SIGNIFICANT CHANGE UP (ref 1.8–7.4)
NEUTROPHILS NFR BLD AUTO: 65.2 % — SIGNIFICANT CHANGE UP (ref 43–77)
NRBC # BLD: 0 /100 WBCS — SIGNIFICANT CHANGE UP (ref 0–0)
NT-PROBNP SERPL-SCNC: 49 PG/ML — SIGNIFICANT CHANGE UP (ref 0–450)
PLATELET # BLD AUTO: 126 K/UL — LOW (ref 150–400)
POTASSIUM SERPL-MCNC: 5.4 MMOL/L — HIGH (ref 3.5–5.3)
POTASSIUM SERPL-SCNC: 5.4 MMOL/L — HIGH (ref 3.5–5.3)
PROT SERPL-MCNC: 7.8 G/DL — SIGNIFICANT CHANGE UP (ref 6–8.3)
PROTHROM AB SERPL-ACNC: 15.2 SEC — HIGH (ref 10.6–13.6)
RBC # BLD: 4.06 M/UL — LOW (ref 4.2–5.8)
RBC # FLD: 13 % — SIGNIFICANT CHANGE UP (ref 10.3–14.5)
SODIUM SERPL-SCNC: 140 MMOL/L — SIGNIFICANT CHANGE UP (ref 135–145)
TROPONIN I SERPL-MCNC: <0.015 NG/ML — SIGNIFICANT CHANGE UP (ref 0–0.04)
WBC # BLD: 6.1 K/UL — SIGNIFICANT CHANGE UP (ref 3.8–10.5)
WBC # FLD AUTO: 6.1 K/UL — SIGNIFICANT CHANGE UP (ref 3.8–10.5)

## 2021-03-11 PROCEDURE — 80053 COMPREHEN METABOLIC PANEL: CPT

## 2021-03-11 PROCEDURE — 99284 EMERGENCY DEPT VISIT MOD MDM: CPT | Mod: 25

## 2021-03-11 PROCEDURE — 84484 ASSAY OF TROPONIN QUANT: CPT

## 2021-03-11 PROCEDURE — 36415 COLL VENOUS BLD VENIPUNCTURE: CPT

## 2021-03-11 PROCEDURE — 99285 EMERGENCY DEPT VISIT HI MDM: CPT

## 2021-03-11 PROCEDURE — 83735 ASSAY OF MAGNESIUM: CPT

## 2021-03-11 PROCEDURE — 71045 X-RAY EXAM CHEST 1 VIEW: CPT | Mod: 26

## 2021-03-11 PROCEDURE — 93005 ELECTROCARDIOGRAM TRACING: CPT

## 2021-03-11 PROCEDURE — 85730 THROMBOPLASTIN TIME PARTIAL: CPT

## 2021-03-11 PROCEDURE — 85025 COMPLETE CBC W/AUTO DIFF WBC: CPT

## 2021-03-11 PROCEDURE — 71045 X-RAY EXAM CHEST 1 VIEW: CPT

## 2021-03-11 PROCEDURE — 83690 ASSAY OF LIPASE: CPT

## 2021-03-11 PROCEDURE — 83880 ASSAY OF NATRIURETIC PEPTIDE: CPT

## 2021-03-11 PROCEDURE — 85610 PROTHROMBIN TIME: CPT

## 2021-03-11 RX ORDER — ACETAMINOPHEN 500 MG
975 TABLET ORAL ONCE
Refills: 0 | Status: COMPLETED | OUTPATIENT
Start: 2021-03-11 | End: 2021-03-11

## 2021-03-11 RX ORDER — LIDOCAINE 4 G/100G
1 CREAM TOPICAL ONCE
Refills: 0 | Status: COMPLETED | OUTPATIENT
Start: 2021-03-11 | End: 2021-03-11

## 2021-03-11 RX ADMIN — Medication 975 MILLIGRAM(S): at 18:22

## 2021-03-11 RX ADMIN — LIDOCAINE 1 PATCH: 4 CREAM TOPICAL at 18:23

## 2021-03-11 NOTE — ED PROVIDER NOTE - PHYSICAL EXAMINATION
General: well appearing male, no acute distress   HEENT: normocephalic, atraumatic   Respiratory: normal work of breathing, lungs clear to auscultation bilaterally   Cardiac: regular rate and rhythm   Abdomen: soft, non-tender, no guarding or rebound   MSK: no swelling or tenderness of lower extremities, moving all extremities spontaneously, full bilateral shoulder ROM  Skin: warm, dry   Neuro: A&Ox3  Psych: appropriate affect

## 2021-03-11 NOTE — ED PROVIDER NOTE - PATIENT PORTAL LINK FT
You can access the FollowMyHealth Patient Portal offered by [facility] by registering at the following website: [email or web address]. By joining [facility]’s FollowMyHealth portal, you will also be able to view your health information using other applications (apps) compatible with our system.

## 2021-03-11 NOTE — ED PROVIDER NOTE - CLINICAL SUMMARY MEDICAL DECISION MAKING FREE TEXT BOX
80M presenting with right sided chest pain. pain worse with movement and exertion. reproducible with arm movement but no tenderness on exam with full ROM. pain constant since last night. unlikely ACS, PE, likely MSK pain. will get labs, cxr, will reassess.

## 2021-03-11 NOTE — ED PROVIDER NOTE - NSFOLLOWUPINSTRUCTIONS_ED_ALL_ED_FT
You were seen in the emergency department for chest pain.     Please follow-up with your primary care doctor in the next 24-48 hours.     If you have any worsening symptoms, severe chest pain, nausea, vomiting, or shortness of breath, please return to the emergency department. You were seen in the emergency department for chest pain.     Please follow-up with your primary care doctor in the next 24-48 hours.     Please take Tylenol and Ibuprofen as prescribed on the bottles for pain control.     If you have any worsening symptoms, severe chest pain, nausea, vomiting, or shortness of breath, please return to the emergency department.

## 2021-03-11 NOTE — ED PROVIDER NOTE - OBJECTIVE STATEMENT
80M, pmh of Asthma, DM, HTN, Hypercholesterolemia, Glaucoma and PSHx of craniotomy for traumatic ICH, presenting with chest pain. patient reports right sided chest pain since last night. pain worse with walking and with moving right arm. no similar symptoms in the past. pain does not radiate. no headache, shortness of breath, nausea, vomiting, abdominal pain, pain or swelling of lower extremities.

## 2022-01-18 NOTE — ED ADULT TRIAGE NOTE - CHIEF COMPLAINT QUOTE
Overall doing fair  Continues to struggle with the anxiety  Does have carpal tunnel syndrome on the right hand and may benefit from wearing a fix wrist splint to bed at night  Try to keep the position of the hands around waist level when sleeping and that should help with circulation into the arms  Be sure to use moisturizing cream immediately after drying to help with the intermittent problem with the rash on the hands  Will give flu shot today    Continue all meds as is R flank pain almost a two weeks

## 2023-06-16 ENCOUNTER — EMERGENCY (EMERGENCY)
Facility: HOSPITAL | Age: 83
LOS: 1 days | Discharge: HOME | End: 2023-06-16
Attending: EMERGENCY MEDICINE
Payer: COMMERCIAL

## 2023-06-16 VITALS
HEART RATE: 61 BPM | RESPIRATION RATE: 18 BRPM | OXYGEN SATURATION: 98 % | SYSTOLIC BLOOD PRESSURE: 133 MMHG | DIASTOLIC BLOOD PRESSURE: 65 MMHG | TEMPERATURE: 97 F

## 2023-06-16 VITALS
OXYGEN SATURATION: 98 % | HEART RATE: 77 BPM | RESPIRATION RATE: 17 BRPM | SYSTOLIC BLOOD PRESSURE: 121 MMHG | DIASTOLIC BLOOD PRESSURE: 66 MMHG | TEMPERATURE: 98 F | WEIGHT: 175.05 LBS

## 2023-06-16 DIAGNOSIS — Z98.890 OTHER SPECIFIED POSTPROCEDURAL STATES: Chronic | ICD-10-CM

## 2023-06-16 LAB
ALBUMIN SERPL ELPH-MCNC: 3.8 G/DL — SIGNIFICANT CHANGE UP (ref 3.5–5)
ALP SERPL-CCNC: 74 U/L — SIGNIFICANT CHANGE UP (ref 40–120)
ALT FLD-CCNC: 26 U/L DA — SIGNIFICANT CHANGE UP (ref 10–60)
ANION GAP SERPL CALC-SCNC: 4 MMOL/L — LOW (ref 5–17)
APPEARANCE UR: CLEAR — SIGNIFICANT CHANGE UP
APTT BLD: 36 SEC — HIGH (ref 27.5–35.5)
AST SERPL-CCNC: 27 U/L — SIGNIFICANT CHANGE UP (ref 10–40)
BASOPHILS # BLD AUTO: 0.01 K/UL — SIGNIFICANT CHANGE UP (ref 0–0.2)
BASOPHILS NFR BLD AUTO: 0.2 % — SIGNIFICANT CHANGE UP (ref 0–2)
BILIRUB SERPL-MCNC: 0.4 MG/DL — SIGNIFICANT CHANGE UP (ref 0.2–1.2)
BILIRUB UR-MCNC: NEGATIVE — SIGNIFICANT CHANGE UP
BUN SERPL-MCNC: 21 MG/DL — HIGH (ref 7–18)
CALCIUM SERPL-MCNC: 9.7 MG/DL — SIGNIFICANT CHANGE UP (ref 8.4–10.5)
CHLORIDE SERPL-SCNC: 109 MMOL/L — HIGH (ref 96–108)
CO2 SERPL-SCNC: 25 MMOL/L — SIGNIFICANT CHANGE UP (ref 22–31)
COLOR SPEC: YELLOW — SIGNIFICANT CHANGE UP
CREAT SERPL-MCNC: 1.56 MG/DL — HIGH (ref 0.5–1.3)
DIFF PNL FLD: NEGATIVE — SIGNIFICANT CHANGE UP
EGFR: 44 ML/MIN/1.73M2 — LOW
EOSINOPHIL # BLD AUTO: 0.16 K/UL — SIGNIFICANT CHANGE UP (ref 0–0.5)
EOSINOPHIL NFR BLD AUTO: 2.8 % — SIGNIFICANT CHANGE UP (ref 0–6)
GLUCOSE SERPL-MCNC: 123 MG/DL — HIGH (ref 70–99)
GLUCOSE UR QL: NEGATIVE — SIGNIFICANT CHANGE UP
HCT VFR BLD CALC: 39.5 % — SIGNIFICANT CHANGE UP (ref 39–50)
HGB BLD-MCNC: 12.5 G/DL — LOW (ref 13–17)
IMM GRANULOCYTES NFR BLD AUTO: 0.2 % — SIGNIFICANT CHANGE UP (ref 0–0.9)
INR BLD: 1.26 RATIO — HIGH (ref 0.88–1.16)
KETONES UR-MCNC: NEGATIVE — SIGNIFICANT CHANGE UP
LACTATE SERPL-SCNC: 1.5 MMOL/L — SIGNIFICANT CHANGE UP (ref 0.7–2)
LEUKOCYTE ESTERASE UR-ACNC: NEGATIVE — SIGNIFICANT CHANGE UP
LYMPHOCYTES # BLD AUTO: 0.9 K/UL — LOW (ref 1–3.3)
LYMPHOCYTES # BLD AUTO: 16 % — SIGNIFICANT CHANGE UP (ref 13–44)
MCHC RBC-ENTMCNC: 30.8 PG — SIGNIFICANT CHANGE UP (ref 27–34)
MCHC RBC-ENTMCNC: 31.6 GM/DL — LOW (ref 32–36)
MCV RBC AUTO: 97.3 FL — SIGNIFICANT CHANGE UP (ref 80–100)
MONOCYTES # BLD AUTO: 0.48 K/UL — SIGNIFICANT CHANGE UP (ref 0–0.9)
MONOCYTES NFR BLD AUTO: 8.5 % — SIGNIFICANT CHANGE UP (ref 2–14)
NEUTROPHILS # BLD AUTO: 4.07 K/UL — SIGNIFICANT CHANGE UP (ref 1.8–7.4)
NEUTROPHILS NFR BLD AUTO: 72.3 % — SIGNIFICANT CHANGE UP (ref 43–77)
NITRITE UR-MCNC: NEGATIVE — SIGNIFICANT CHANGE UP
NRBC # BLD: 0 /100 WBCS — SIGNIFICANT CHANGE UP (ref 0–0)
PH UR: 5 — SIGNIFICANT CHANGE UP (ref 5–8)
PLATELET # BLD AUTO: 176 K/UL — SIGNIFICANT CHANGE UP (ref 150–400)
POTASSIUM SERPL-MCNC: 4.4 MMOL/L — SIGNIFICANT CHANGE UP (ref 3.5–5.3)
POTASSIUM SERPL-SCNC: 4.4 MMOL/L — SIGNIFICANT CHANGE UP (ref 3.5–5.3)
PROT SERPL-MCNC: 8.2 G/DL — SIGNIFICANT CHANGE UP (ref 6–8.3)
PROT UR-MCNC: NEGATIVE — SIGNIFICANT CHANGE UP
PROTHROM AB SERPL-ACNC: 15 SEC — HIGH (ref 10.5–13.4)
RBC # BLD: 4.06 M/UL — LOW (ref 4.2–5.8)
RBC # FLD: 13 % — SIGNIFICANT CHANGE UP (ref 10.3–14.5)
SODIUM SERPL-SCNC: 138 MMOL/L — SIGNIFICANT CHANGE UP (ref 135–145)
SP GR SPEC: 1.01 — SIGNIFICANT CHANGE UP (ref 1.01–1.02)
TROPONIN I, HIGH SENSITIVITY RESULT: 5.7 NG/L — SIGNIFICANT CHANGE UP
UROBILINOGEN FLD QL: NEGATIVE — SIGNIFICANT CHANGE UP
WBC # BLD: 5.63 K/UL — SIGNIFICANT CHANGE UP (ref 3.8–10.5)
WBC # FLD AUTO: 5.63 K/UL — SIGNIFICANT CHANGE UP (ref 3.8–10.5)

## 2023-06-16 PROCEDURE — 73590 X-RAY EXAM OF LOWER LEG: CPT

## 2023-06-16 PROCEDURE — 71046 X-RAY EXAM CHEST 2 VIEWS: CPT | Mod: 26

## 2023-06-16 PROCEDURE — 85025 COMPLETE CBC W/AUTO DIFF WBC: CPT

## 2023-06-16 PROCEDURE — 87040 BLOOD CULTURE FOR BACTERIA: CPT

## 2023-06-16 PROCEDURE — 80053 COMPREHEN METABOLIC PANEL: CPT

## 2023-06-16 PROCEDURE — 87086 URINE CULTURE/COLONY COUNT: CPT

## 2023-06-16 PROCEDURE — 93005 ELECTROCARDIOGRAM TRACING: CPT

## 2023-06-16 PROCEDURE — 36415 COLL VENOUS BLD VENIPUNCTURE: CPT

## 2023-06-16 PROCEDURE — 85730 THROMBOPLASTIN TIME PARTIAL: CPT

## 2023-06-16 PROCEDURE — 85610 PROTHROMBIN TIME: CPT

## 2023-06-16 PROCEDURE — 87077 CULTURE AEROBIC IDENTIFY: CPT

## 2023-06-16 PROCEDURE — 93970 EXTREMITY STUDY: CPT

## 2023-06-16 PROCEDURE — 84484 ASSAY OF TROPONIN QUANT: CPT

## 2023-06-16 PROCEDURE — 93970 EXTREMITY STUDY: CPT | Mod: 26

## 2023-06-16 PROCEDURE — 93010 ELECTROCARDIOGRAM REPORT: CPT

## 2023-06-16 PROCEDURE — 81003 URINALYSIS AUTO W/O SCOPE: CPT

## 2023-06-16 PROCEDURE — 83605 ASSAY OF LACTIC ACID: CPT

## 2023-06-16 PROCEDURE — 96374 THER/PROPH/DIAG INJ IV PUSH: CPT

## 2023-06-16 PROCEDURE — 73590 X-RAY EXAM OF LOWER LEG: CPT | Mod: 26,LT,RT

## 2023-06-16 PROCEDURE — 99285 EMERGENCY DEPT VISIT HI MDM: CPT | Mod: 25

## 2023-06-16 PROCEDURE — 71046 X-RAY EXAM CHEST 2 VIEWS: CPT

## 2023-06-16 PROCEDURE — 87186 SC STD MICRODIL/AGAR DIL: CPT

## 2023-06-16 PROCEDURE — 99285 EMERGENCY DEPT VISIT HI MDM: CPT

## 2023-06-16 RX ORDER — AMPICILLIN SODIUM AND SULBACTAM SODIUM 250; 125 MG/ML; MG/ML
3 INJECTION, POWDER, FOR SUSPENSION INTRAMUSCULAR; INTRAVENOUS ONCE
Refills: 0 | Status: COMPLETED | OUTPATIENT
Start: 2023-06-16 | End: 2023-06-16

## 2023-06-16 RX ORDER — SODIUM CHLORIDE 9 MG/ML
2500 INJECTION INTRAMUSCULAR; INTRAVENOUS; SUBCUTANEOUS ONCE
Refills: 0 | Status: COMPLETED | OUTPATIENT
Start: 2023-06-16 | End: 2023-06-16

## 2023-06-16 RX ADMIN — AMPICILLIN SODIUM AND SULBACTAM SODIUM 200 GRAM(S): 250; 125 INJECTION, POWDER, FOR SUSPENSION INTRAMUSCULAR; INTRAVENOUS at 17:39

## 2023-06-16 RX ADMIN — SODIUM CHLORIDE 2500 MILLILITER(S): 9 INJECTION INTRAMUSCULAR; INTRAVENOUS; SUBCUTANEOUS at 17:40

## 2023-06-16 NOTE — ED PROVIDER NOTE - CLINICAL SUMMARY MEDICAL DECISION MAKING FREE TEXT BOX
82-year-old male presents to ED with bilateral lower extremity cellulitis which failed outpatient antibiotics.  Swelling has increased as has surrounding erythema.  No chest pain or shortness of breath no nausea no vomiting no diarrhea afebrile here in ED.  We will check labs, x-ray rule out osteo, IV antibiotics, anticipate admission

## 2023-06-16 NOTE — ED PROVIDER NOTE - PATIENT PORTAL LINK FT
You can access the FollowMyHealth Patient Portal offered by [facility] by registering at the following website: [email or web address]. By joining [facility]’s FollowMyHealth portal, you will also be able to view your health information using other applications (apps) compatible with our system.

## 2023-06-16 NOTE — ED PROVIDER NOTE - OBJECTIVE STATEMENT
82-year-old male history of diabetes, hypertension, hyperlipidemia, asthma, BPH, history of a craniotomy, on Eliquis presents with 1 week of bilateral lower extremity swelling.  As per patient he had a mechanical fall last week.  Patient was attempting to step over a box and tripped over a box falling.  Patient was able to get up without any assistance.  Patient denies any LOC.  No head trauma.  Patient developed wounds to bilateral lower extremities following the fall.  Patient followed up with PMD for lower extremity wounds and was prescribed cephalexin.  Patient finished course of antibiotics yesterday.  Patient still has lower extremity swelling as well as warmth and erythema.  Patient went back to PMD was referred to the ER for IV antibiotics for bilateral lower extremity cellulitis which failed outpatient treatment.  No fever, no chest pain, no shortness of breath, no vomiting,  no diarrhea.

## 2023-06-16 NOTE — ED PROVIDER NOTE - PROGRESS NOTE DETAILS
admission advised for failed outpatient treatment however patient lives alone and says he did not prepare for admission.  pt unwilling to be admitted.  Risks of leaving AMA explained to patient.  Will add new prescription, will dc AMA.

## 2023-06-16 NOTE — ED ADULT NURSE NOTE - THROAT
"-- DO NOT REPLY / DO NOT REPLY ALL --  -- Message is from the [email or web address]--    General Patient Message      Reason for Call: Patient is returning the missed call and wanted to inform the doctor his eye appointment is this Friday December 20,2019 at 8:30 AM.    Caller Information       Type Contact Phone    12/17/2019 06:46 PM Phone (Incoming) Shivani Purcell (Self) [phone] (H)          Alternative phone number: none. Turnaround time given to caller: ""This message will be sent to [facility] Provider's name]. The clinical team will fulfill your request as soon as they review your message when the office opens tomorrow. \""}    " asymptomatic

## 2023-06-16 NOTE — ED PROVIDER NOTE - NSFOLLOWUPINSTRUCTIONS_ED_ALL_ED_FT
Cellulitis, Adult  A person's legs and feet. One leg is normal and the other leg is affected by cellulitis.  Cellulitis is a skin infection. The infected area is usually warm, red, swollen, and tender. This condition occurs most often in the arms and lower legs. The infection can travel to the muscles, blood, and underlying tissue and become serious. It is very important to get treated for this condition.    What are the causes?  Cellulitis is caused by bacteria. The bacteria enter through a break in the skin, such as a cut, burn, insect bite, open sore, or crack.    What increases the risk?  This condition is more likely to occur in people who:  Have a weak body defense system (immune system).  Have open wounds on the skin, such as cuts, burns, bites, and scrapes. Bacteria can enter the body through these open wounds.  Are older than 60 years of age.  Have diabetes.  Have a type of long-lasting (chronic) liver disease (cirrhosis) or kidney disease.  Are obese.  Have a skin condition such as:  Itchy rash (eczema).  Slow movement of blood in the veins (venous stasis).  Fluid buildup below the skin (edema).  Have had radiation therapy.  Use IV drugs.  What are the signs or symptoms?  Symptoms of this condition include:  Redness, streaking, or spotting on the skin.  Swollen area of the skin.  Tenderness or pain when an area of the skin is touched.  Warm skin.  A fever.  Chills.  Blisters.  How is this diagnosed?  This condition is diagnosed based on a medical history and physical exam. You may also have tests, including:  Blood tests.  Imaging tests.  How is this treated?  Treatment for this condition may include:  Medicines, such as antibiotic medicines or medicines to treat allergies (antihistamines).  Supportive care, such as rest and application of cold or warm cloths (compresses) to the skin.  Hospital care, if the condition is severe.  The infection usually starts to get better within 1–2 days of treatment.    Follow these instructions at home:  A comparison of three sample cups showing dark yellow, yellow, and pale yellow urine.  Medicines    Take over-the-counter and prescription medicines only as told by your health care provider.  If you were prescribed an antibiotic medicine, take it as told by your health care provider. Do not stop taking the antibiotic even if you start to feel better.  General instructions    Drink enough fluid to keep your urine pale yellow.  Do not touch or rub the infected area.  Raise (elevate) the infected area above the level of your heart while you are sitting or lying down.  Apply warm or cold compresses to the affected area as told by your health care provider.  Keep all follow-up visits as told by your health care provider. This is important. These visits let your health care provider make sure a more serious infection is not developing.  Contact a health care provider if:  You have a fever.  Your symptoms do not begin to improve within 1–2 days of starting treatment.  Your bone or joint underneath the infected area becomes painful after the skin has healed.  Your infection returns in the same area or another area.  You notice a swollen bump in the infected area.  You develop new symptoms.  You have a general ill feeling (malaise) with muscle aches and pains.  Get help right away if:  Your symptoms get worse.  You feel very sleepy.  You develop vomiting or diarrhea that persists.  You notice red streaks coming from the infected area.  Your red area gets larger or turns dark in color.  These symptoms may represent a serious problem that is an emergency. Do not wait to see if the symptoms will go away. Get medical help right away. Call your local emergency services (911 in the [place] Do not drive yourself to the hospital.    Summary  Cellulitis is a skin infection. This condition occurs most often in the arms and lower legs.  Treatment for this condition may include medicines, such as antibiotic medicines or antihistamines.  Take over-the-counter and prescription medicines only as told by your health care provider. If you were prescribed an antibiotic medicine, do not stop taking the antibiotic even if you start to feel better.  Contact a health care provider if your symptoms do not begin to improve within 1–2 days of starting treatment or your symptoms get worse.  Keep all follow-up visits as told by your health care provider. This is important. These visits let your health care provider make sure that a more serious infection is not developing.  This information is not intended to replace advice given to you by your health care provider. Make sure you discuss any questions you have with your health care provider.    Document Revised: 09/28/2022 Document Reviewed: 09/29/2022

## 2023-06-16 NOTE — ED ADULT NURSE NOTE - NSFALLRISKINTERV_ED_ALL_ED
Assistance OOB with selected safe patient handling equipment if applicable/Communicate fall risk and risk factors to all staff, patient, and family/Provide patient with walking aids/Provide visual cue: yellow wristband, yellow gown, etc/Reinforce activity limits and safety measures with patient and family/Call bell, personal items and telephone in reach/Instruct patient to call for assistance before getting out of bed/chair/stretcher/Non-slip footwear applied when patient is off stretcher/[place] to call system/Physically safe environment - no spills, clutter or unnecessary equipment/Purposeful Proactive Rounding/Room/bathroom lighting operational, light cord in reach

## 2023-06-18 LAB
CULTURE RESULTS: SIGNIFICANT CHANGE UP
SPECIMEN SOURCE: SIGNIFICANT CHANGE UP

## 2023-06-19 LAB
-  AMIKACIN: SIGNIFICANT CHANGE UP
-  AZTREONAM: SIGNIFICANT CHANGE UP
-  CEFEPIME: SIGNIFICANT CHANGE UP
-  CEFTAZIDIME: SIGNIFICANT CHANGE UP
-  CIPROFLOXACIN: SIGNIFICANT CHANGE UP
-  GENTAMICIN: SIGNIFICANT CHANGE UP
-  IMIPENEM: SIGNIFICANT CHANGE UP
-  LEVOFLOXACIN: SIGNIFICANT CHANGE UP
-  MEROPENEM: SIGNIFICANT CHANGE UP
-  PIPERACILLIN/TAZOBACTAM: SIGNIFICANT CHANGE UP
-  TOBRAMYCIN: SIGNIFICANT CHANGE UP
METHOD TYPE: SIGNIFICANT CHANGE UP
ORGANISM # SPEC MICROSCOPIC CNT: SIGNIFICANT CHANGE UP

## 2023-06-21 LAB
CULTURE RESULTS: SIGNIFICANT CHANGE UP
CULTURE RESULTS: SIGNIFICANT CHANGE UP
SPECIMEN SOURCE: SIGNIFICANT CHANGE UP
SPECIMEN SOURCE: SIGNIFICANT CHANGE UP

## 2023-06-22 NOTE — PATIENT PROFILE ADULT - NSASFUNCLEVELADLTRANSFER_GEN_A_NUR
-- DO NOT REPLY / DO NOT REPLY ALL --  -- Message is from Engagement Center Operations (ECO) --    General Patient Message: Rashida from [facility] is calling wondering if we received a physcians order. It was faxed over yesterday 06/21/2023. Please call back to assist.  If it was received please fax back over to the fax number provided on the paperwork.    Caller Information       Type Contact Phone/Fax    06/22/2023 09:48 AM CDT [facility] (Incoming) [phone]        Alternative phone number: [phone] ext [phone] hours from 7-3:30    Can a detailed message be left? Yes    Message Turnaround:     Is it Working Hours? Yes - Working Hours     [place]:    Please give this turnaround time to the caller:   \"This message will be sent to [state Provider's name]. The clinical team will fulfill your request as soon as they review your message.\"                 3 = assistive equipment and person

## 2023-06-23 ENCOUNTER — INPATIENT (INPATIENT)
Facility: HOSPITAL | Age: 83
LOS: 3 days | Discharge: HOME | DRG: 603 | End: 2023-06-27
Attending: INTERNAL MEDICINE | Admitting: INTERNAL MEDICINE
Payer: COMMERCIAL

## 2023-06-23 VITALS
OXYGEN SATURATION: 99 % | RESPIRATION RATE: 18 BRPM | TEMPERATURE: 98 F | HEIGHT: 66 IN | HEART RATE: 72 BPM | WEIGHT: 169.09 LBS | SYSTOLIC BLOOD PRESSURE: 121 MMHG | DIASTOLIC BLOOD PRESSURE: 68 MMHG

## 2023-06-23 DIAGNOSIS — E78.5 HYPERLIPIDEMIA, UNSPECIFIED: ICD-10-CM

## 2023-06-23 DIAGNOSIS — L03.90 CELLULITIS, UNSPECIFIED: ICD-10-CM

## 2023-06-23 DIAGNOSIS — N18.9 CHRONIC KIDNEY DISEASE, UNSPECIFIED: ICD-10-CM

## 2023-06-23 DIAGNOSIS — N40.0 BENIGN PROSTATIC HYPERPLASIA WITHOUT LOWER URINARY TRACT SYMPTOMS: ICD-10-CM

## 2023-06-23 DIAGNOSIS — E11.9 TYPE 2 DIABETES MELLITUS WITHOUT COMPLICATIONS: ICD-10-CM

## 2023-06-23 DIAGNOSIS — I10 ESSENTIAL (PRIMARY) HYPERTENSION: ICD-10-CM

## 2023-06-23 DIAGNOSIS — Z29.9 ENCOUNTER FOR PROPHYLACTIC MEASURES, UNSPECIFIED: ICD-10-CM

## 2023-06-23 DIAGNOSIS — Z98.890 OTHER SPECIFIED POSTPROCEDURAL STATES: Chronic | ICD-10-CM

## 2023-06-23 DIAGNOSIS — Z86.718 PERSONAL HISTORY OF OTHER VENOUS THROMBOSIS AND EMBOLISM: ICD-10-CM

## 2023-06-23 LAB
ALBUMIN SERPL ELPH-MCNC: 3.3 G/DL — LOW (ref 3.5–5)
ALP SERPL-CCNC: 72 U/L — SIGNIFICANT CHANGE UP (ref 40–120)
ALT FLD-CCNC: 23 U/L DA — SIGNIFICANT CHANGE UP (ref 10–60)
ANION GAP SERPL CALC-SCNC: 4 MMOL/L — LOW (ref 5–17)
ANION GAP SERPL CALC-SCNC: 5 MMOL/L — SIGNIFICANT CHANGE UP (ref 5–17)
AST SERPL-CCNC: 32 U/L — SIGNIFICANT CHANGE UP (ref 10–40)
BASOPHILS # BLD AUTO: 0.02 K/UL — SIGNIFICANT CHANGE UP (ref 0–0.2)
BASOPHILS NFR BLD AUTO: 0.5 % — SIGNIFICANT CHANGE UP (ref 0–2)
BILIRUB SERPL-MCNC: 0.4 MG/DL — SIGNIFICANT CHANGE UP (ref 0.2–1.2)
BUN SERPL-MCNC: 17 MG/DL — SIGNIFICANT CHANGE UP (ref 7–18)
BUN SERPL-MCNC: 17 MG/DL — SIGNIFICANT CHANGE UP (ref 7–18)
CALCIUM SERPL-MCNC: 9.1 MG/DL — SIGNIFICANT CHANGE UP (ref 8.4–10.5)
CALCIUM SERPL-MCNC: 9.2 MG/DL — SIGNIFICANT CHANGE UP (ref 8.4–10.5)
CHLORIDE SERPL-SCNC: 107 MMOL/L — SIGNIFICANT CHANGE UP (ref 96–108)
CHLORIDE SERPL-SCNC: 111 MMOL/L — HIGH (ref 96–108)
CO2 SERPL-SCNC: 24 MMOL/L — SIGNIFICANT CHANGE UP (ref 22–31)
CO2 SERPL-SCNC: 27 MMOL/L — SIGNIFICANT CHANGE UP (ref 22–31)
CREAT SERPL-MCNC: 1.42 MG/DL — HIGH (ref 0.5–1.3)
CREAT SERPL-MCNC: 1.43 MG/DL — HIGH (ref 0.5–1.3)
CRP SERPL-MCNC: <3 MG/L — SIGNIFICANT CHANGE UP
EGFR: 49 ML/MIN/1.73M2 — LOW
EGFR: 49 ML/MIN/1.73M2 — LOW
EOSINOPHIL # BLD AUTO: 0.23 K/UL — SIGNIFICANT CHANGE UP (ref 0–0.5)
EOSINOPHIL NFR BLD AUTO: 5.3 % — SIGNIFICANT CHANGE UP (ref 0–6)
ERYTHROCYTE [SEDIMENTATION RATE] IN BLOOD: 27 MM/HR — HIGH (ref 0–20)
GLUCOSE BLDC GLUCOMTR-MCNC: 61 MG/DL — LOW (ref 70–99)
GLUCOSE BLDC GLUCOMTR-MCNC: 84 MG/DL — SIGNIFICANT CHANGE UP (ref 70–99)
GLUCOSE BLDC GLUCOMTR-MCNC: 87 MG/DL — SIGNIFICANT CHANGE UP (ref 70–99)
GLUCOSE SERPL-MCNC: 156 MG/DL — HIGH (ref 70–99)
GLUCOSE SERPL-MCNC: 97 MG/DL — SIGNIFICANT CHANGE UP (ref 70–99)
HCT VFR BLD CALC: 35.8 % — LOW (ref 39–50)
HGB BLD-MCNC: 11.6 G/DL — LOW (ref 13–17)
IMM GRANULOCYTES NFR BLD AUTO: 0.2 % — SIGNIFICANT CHANGE UP (ref 0–0.9)
LYMPHOCYTES # BLD AUTO: 0.76 K/UL — LOW (ref 1–3.3)
LYMPHOCYTES # BLD AUTO: 17.4 % — SIGNIFICANT CHANGE UP (ref 13–44)
MCHC RBC-ENTMCNC: 31 PG — SIGNIFICANT CHANGE UP (ref 27–34)
MCHC RBC-ENTMCNC: 32.4 GM/DL — SIGNIFICANT CHANGE UP (ref 32–36)
MCV RBC AUTO: 95.7 FL — SIGNIFICANT CHANGE UP (ref 80–100)
MONOCYTES # BLD AUTO: 0.4 K/UL — SIGNIFICANT CHANGE UP (ref 0–0.9)
MONOCYTES NFR BLD AUTO: 9.2 % — SIGNIFICANT CHANGE UP (ref 2–14)
NEUTROPHILS # BLD AUTO: 2.94 K/UL — SIGNIFICANT CHANGE UP (ref 1.8–7.4)
NEUTROPHILS NFR BLD AUTO: 67.4 % — SIGNIFICANT CHANGE UP (ref 43–77)
NRBC # BLD: 0 /100 WBCS — SIGNIFICANT CHANGE UP (ref 0–0)
PLATELET # BLD AUTO: 194 K/UL — SIGNIFICANT CHANGE UP (ref 150–400)
POTASSIUM SERPL-MCNC: 3.9 MMOL/L — SIGNIFICANT CHANGE UP (ref 3.5–5.3)
POTASSIUM SERPL-MCNC: 5.4 MMOL/L — HIGH (ref 3.5–5.3)
POTASSIUM SERPL-SCNC: 3.9 MMOL/L — SIGNIFICANT CHANGE UP (ref 3.5–5.3)
POTASSIUM SERPL-SCNC: 5.4 MMOL/L — HIGH (ref 3.5–5.3)
PROT SERPL-MCNC: 7.4 G/DL — SIGNIFICANT CHANGE UP (ref 6–8.3)
RBC # BLD: 3.74 M/UL — LOW (ref 4.2–5.8)
RBC # FLD: 13.4 % — SIGNIFICANT CHANGE UP (ref 10.3–14.5)
SODIUM SERPL-SCNC: 138 MMOL/L — SIGNIFICANT CHANGE UP (ref 135–145)
SODIUM SERPL-SCNC: 140 MMOL/L — SIGNIFICANT CHANGE UP (ref 135–145)
WBC # BLD: 4.36 K/UL — SIGNIFICANT CHANGE UP (ref 3.8–10.5)
WBC # FLD AUTO: 4.36 K/UL — SIGNIFICANT CHANGE UP (ref 3.8–10.5)

## 2023-06-23 PROCEDURE — 99285 EMERGENCY DEPT VISIT HI MDM: CPT

## 2023-06-23 PROCEDURE — 99223 1ST HOSP IP/OBS HIGH 75: CPT | Mod: GC

## 2023-06-23 PROCEDURE — 93010 ELECTROCARDIOGRAM REPORT: CPT

## 2023-06-23 RX ORDER — TAMSULOSIN HYDROCHLORIDE 0.4 MG/1
1 CAPSULE ORAL
Qty: 0 | Refills: 0 | DISCHARGE

## 2023-06-23 RX ORDER — DORZOLAMIDE HYDROCHLORIDE 20 MG/ML
1 SOLUTION/ DROPS OPHTHALMIC THREE TIMES A DAY
Refills: 0 | Status: DISCONTINUED | OUTPATIENT
Start: 2023-06-23 | End: 2023-06-27

## 2023-06-23 RX ORDER — LATANOPROST 0.05 MG/ML
1 SOLUTION/ DROPS OPHTHALMIC; TOPICAL AT BEDTIME
Refills: 0 | Status: DISCONTINUED | OUTPATIENT
Start: 2023-06-23 | End: 2023-06-27

## 2023-06-23 RX ORDER — INSULIN LISPRO 100/ML
VIAL (ML) SUBCUTANEOUS AT BEDTIME
Refills: 0 | Status: DISCONTINUED | OUTPATIENT
Start: 2023-06-23 | End: 2023-06-27

## 2023-06-23 RX ORDER — INSULIN LISPRO 100/ML
VIAL (ML) SUBCUTANEOUS
Refills: 0 | Status: DISCONTINUED | OUTPATIENT
Start: 2023-06-23 | End: 2023-06-27

## 2023-06-23 RX ORDER — SIMVASTATIN 20 MG/1
20 TABLET, FILM COATED ORAL AT BEDTIME
Refills: 0 | Status: DISCONTINUED | OUTPATIENT
Start: 2023-06-23 | End: 2023-06-27

## 2023-06-23 RX ORDER — FUROSEMIDE 40 MG
20 TABLET ORAL DAILY
Refills: 0 | Status: DISCONTINUED | OUTPATIENT
Start: 2023-06-23 | End: 2023-06-24

## 2023-06-23 RX ORDER — DORZOLAMIDE HYDROCHLORIDE 20 MG/ML
1 SOLUTION/ DROPS OPHTHALMIC
Refills: 0 | DISCHARGE

## 2023-06-23 RX ORDER — LATANOPROST 0.05 MG/ML
1 SOLUTION/ DROPS OPHTHALMIC; TOPICAL
Qty: 0 | Refills: 0 | DISCHARGE

## 2023-06-23 RX ORDER — GABAPENTIN 400 MG/1
300 CAPSULE ORAL EVERY 12 HOURS
Refills: 0 | Status: DISCONTINUED | OUTPATIENT
Start: 2023-06-23 | End: 2023-06-27

## 2023-06-23 RX ORDER — VANCOMYCIN HCL 1 G
1000 VIAL (EA) INTRAVENOUS ONCE
Refills: 0 | Status: COMPLETED | OUTPATIENT
Start: 2023-06-23 | End: 2023-06-23

## 2023-06-23 RX ORDER — ACETAMINOPHEN 500 MG
650 TABLET ORAL EVERY 6 HOURS
Refills: 0 | Status: DISCONTINUED | OUTPATIENT
Start: 2023-06-23 | End: 2023-06-27

## 2023-06-23 RX ORDER — TAMSULOSIN HYDROCHLORIDE 0.4 MG/1
0.4 CAPSULE ORAL AT BEDTIME
Refills: 0 | Status: DISCONTINUED | OUTPATIENT
Start: 2023-06-23 | End: 2023-06-27

## 2023-06-23 RX ORDER — ACETAMINOPHEN 500 MG
1000 TABLET ORAL ONCE
Refills: 0 | Status: COMPLETED | OUTPATIENT
Start: 2023-06-23 | End: 2023-06-23

## 2023-06-23 RX ORDER — DULOXETINE HYDROCHLORIDE 30 MG/1
1 CAPSULE, DELAYED RELEASE ORAL
Refills: 0 | DISCHARGE

## 2023-06-23 RX ORDER — SODIUM CHLORIDE 9 MG/ML
1000 INJECTION, SOLUTION INTRAVENOUS
Refills: 0 | Status: DISCONTINUED | OUTPATIENT
Start: 2023-06-23 | End: 2023-06-24

## 2023-06-23 RX ORDER — METFORMIN HYDROCHLORIDE 850 MG/1
1 TABLET ORAL
Qty: 0 | Refills: 0 | DISCHARGE

## 2023-06-23 RX ORDER — ONDANSETRON 8 MG/1
4 TABLET, FILM COATED ORAL EVERY 8 HOURS
Refills: 0 | Status: DISCONTINUED | OUTPATIENT
Start: 2023-06-23 | End: 2023-06-27

## 2023-06-23 RX ORDER — GLIMEPIRIDE 1 MG
1 TABLET ORAL
Refills: 0 | DISCHARGE

## 2023-06-23 RX ORDER — SIMVASTATIN 20 MG/1
1 TABLET, FILM COATED ORAL
Qty: 0 | Refills: 0 | DISCHARGE

## 2023-06-23 RX ORDER — DULOXETINE HYDROCHLORIDE 30 MG/1
20 CAPSULE, DELAYED RELEASE ORAL DAILY
Refills: 0 | Status: DISCONTINUED | OUTPATIENT
Start: 2023-06-23 | End: 2023-06-27

## 2023-06-23 RX ORDER — FLUTICASONE PROPIONATE AND SALMETEROL 50; 250 UG/1; UG/1
1 POWDER ORAL; RESPIRATORY (INHALATION)
Qty: 0 | Refills: 0 | DISCHARGE

## 2023-06-23 RX ORDER — SITAGLIPTIN AND METFORMIN HYDROCHLORIDE 500; 50 MG/1; MG/1
1 TABLET, FILM COATED ORAL
Refills: 0 | DISCHARGE

## 2023-06-23 RX ORDER — FLUTICASONE PROPIONATE AND SALMETEROL 50; 250 UG/1; UG/1
1 POWDER ORAL; RESPIRATORY (INHALATION)
Refills: 0 | DISCHARGE

## 2023-06-23 RX ADMIN — SODIUM CHLORIDE 100 MILLILITER(S): 9 INJECTION, SOLUTION INTRAVENOUS at 22:21

## 2023-06-23 RX ADMIN — DORZOLAMIDE HYDROCHLORIDE 1 DROP(S): 20 SOLUTION/ DROPS OPHTHALMIC at 22:15

## 2023-06-23 RX ADMIN — SIMVASTATIN 20 MILLIGRAM(S): 20 TABLET, FILM COATED ORAL at 22:15

## 2023-06-23 RX ADMIN — TAMSULOSIN HYDROCHLORIDE 0.4 MILLIGRAM(S): 0.4 CAPSULE ORAL at 22:15

## 2023-06-23 RX ADMIN — LATANOPROST 1 DROP(S): 0.05 SOLUTION/ DROPS OPHTHALMIC; TOPICAL at 22:15

## 2023-06-23 RX ADMIN — Medication 1000 MILLIGRAM(S): at 13:05

## 2023-06-23 RX ADMIN — Medication 250 MILLIGRAM(S): at 13:00

## 2023-06-23 RX ADMIN — Medication 1000 MILLIGRAM(S): at 16:00

## 2023-06-23 RX ADMIN — Medication 400 MILLIGRAM(S): at 13:00

## 2023-06-23 RX ADMIN — SODIUM CHLORIDE 100 MILLILITER(S): 9 INJECTION, SOLUTION INTRAVENOUS at 18:07

## 2023-06-23 NOTE — ED ADULT NURSE NOTE - NSFALLUNIVINTERV_ED_ALL_ED
Bed/Stretcher in lowest position, wheels locked, appropriate side rails in place/Call bell, personal items and telephone in reach/Instruct patient to call for assistance before getting out of bed/chair/stretcher/Non-slip footwear applied when patient is off stretcher/[employer] to call system/Physically safe environment - no spills, clutter or unnecessary equipment/Purposeful proactive rounding/Room/bathroom lighting operational, light cord in reach

## 2023-06-23 NOTE — ED PROVIDER NOTE - PHYSICAL EXAMINATION
Exam:  General: Patient well appearing, vital signs within normal limits  HEENT: airway patent with moist mucous membranes  Cardiac: RRR S1/S2 with strong peripheral pulses  Respiratory: lungs clear without respiratory distress  GI: abdomen soft, non tender, non distended  Ext: bilateral lower extremity swelling and erythema with anterior shin wounds with serous fluid leakage  Psych: normal mood and affect

## 2023-06-23 NOTE — H&P ADULT - PROBLEM SELECTOR PLAN 1
p/w worsening LE edema x2wk s/p mechanical fall with b/l anterior shin injuries, now non-healing   (+) b/l LE edema +2 extending to the knee w/ b/l non-healing wounds w/ granulation tissue. RLE with weeping edema.  failed o/p abx on keflex and clindamycin   aseptic appearing, VSS with no leukocytosis which can be falsely low in the setting of recent abx use   start cefazolin   f/u BCx  ID consulted: Dr. Hernandez, will see tomorrow   Podiatry consulted, will see tomorrow

## 2023-06-23 NOTE — H&P ADULT - PROBLEM SELECTOR PLAN 2
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

## 2023-06-23 NOTE — H&P ADULT - NSHPREVIEWOFSYSTEMS_GEN_ALL_CORE
CONSTITUTIONAL: No weakness and fatigue; No fevers and chills  EYES/ENT: No visual changes;  No vertigo or throat pain   NECK: No pain or stiffness  RESPIRATORY: No cough, wheezing, hemoptysis; No shortness of breath  CARDIOVASCULAR: No chest pain or palpitations  GASTROINTESTINAL: No abdominal or epigastric pain. No nausea, vomiting, or hematemesis; No diarrhea or constipation. No melena or hematochezia.  GENITOURINARY: No dysuria, frequency or hematuria  NEUROLOGICAL: No numbness or weakness  SKIN: No itching, rashes

## 2023-06-23 NOTE — H&P ADULT - HISTORY OF PRESENT ILLNESS
82M from home, ambulates independently, PMHx HTN, HLD, DM w/ Diabetic Neuropathy, p/w complaining of bilateral lower extremity swelling and pain x 2wk. Reports that it began after he fell over a week ago, tripping over a machine at home and sustained some injuries to his legs. Describes the wounds as non- healing, now draining with fluid with worsening swelling and pain. Denies fevers, chills, and night sweats. He went to his PMD who started him on Clindamycin which he failed, then was sent to the ED last week to be admitted for IV antibiotics but signed out AMA d/t personal issues. States that the wound is still not healing despite extended abx use from the recent ED visit. Denies h/o venous insufficiency, does not follow vascular surgery. No reported SOB, chest pains, or palpitations.  Denies h/o CHF but is on Lasix at home. Reports that he has leg swelling prior to fall worse when on his feet for prolonged time and improve with elevation, now stating the leg swelling is worse since his injury.

## 2023-06-23 NOTE — ED ADULT NURSE NOTE - OBJECTIVE STATEMENT
Pt c/o bilateral legs pain x week. Pt noted redness, swelling and open wound to bilateral lower extremities.

## 2023-06-23 NOTE — H&P ADULT - ATTENDING COMMENTS
82M from home, ambulates independently, PMHx HTN, HLD, DM w/ Diabetic Neuropathy, Hx bilateral LE DVT on eliquis p/w complaining of bilateral lower extremity swelling and pain x 2wk.  Admit for bilateral extremity wounds with cellulitis, trauma with fall, acute on chronic LE edema, and CKD.    #Bilateral extremity wounds with cellulitis, outpatient treatment failed  #Trauma with fall  #Acute on chronic LE edema  #CKD  #Hx bilateral LE DVT on eliquis   #HTN, HLD, DM  Completed and failed outpatient treatment of cephalexin and clindamycin subsequently. Patient with bilateral open wounds with weeping on bilateral shins and surrounding erythema. Will admit for IV abx. LE with 2+ pitting edema to knees which is worse than baseline.  - Start vancomycin and cefazolin to cover MRSA and strep  - MRSA swab  - Increase home lasix to lasix 40mg PO during admission for increased swelling  - Baseline Cr 1.4 - monitor in setting of increased lasix and vancomycin  - ID consult  - Podiatry consult  - Restart home eliquis and home antiHTN/HLD medication  - PT eval

## 2023-06-23 NOTE — H&P ADULT - PROBLEM SELECTOR PLAN 3
h/o DM on Janumet and Glimepiride   will hold oral dm meds while inpatient   f/u A1c  c/w sliding scale  Adjust insulin as indicated  FS ACHS

## 2023-06-23 NOTE — ED PROVIDER NOTE - CLINICAL SUMMARY MEDICAL DECISION MAKING FREE TEXT BOX
Shin wounds and leg edema failing outpatient management with oral antibiotics despite 2 rounds.  Plan for labs and likely admission for failure of outpatient management.  Patient states that today he will accept admission and will stay for further treatment.

## 2023-06-23 NOTE — ED ADULT NURSE NOTE - NS ED NURSE LEVEL OF CONSCIOUSNESS MENTAL STATUS
Awake/Alert Topical Sulfur Applications Counseling: Topical Sulfur Counseling: Patient counseled that this medication may cause skin irritation or allergic reactions.  In the event of skin irritation, the patient was advised to reduce the amount of the drug applied or use it less frequently.   The patient verbalized understanding of the proper use and possible adverse effects of topical sulfur application.  All of the patient's questions and concerns were addressed.

## 2023-06-23 NOTE — H&P ADULT - NSHPPHYSICALEXAM_GEN_ALL_CORE
Vital Signs Last 24 Hrs  T(C): 36.8 (23 Jun 2023 11:12), Max: 36.8 (23 Jun 2023 11:12)  T(F): 98.2 (23 Jun 2023 11:12), Max: 98.2 (23 Jun 2023 11:12)  HR: 72 (23 Jun 2023 11:12) (72 - 72)  BP: 121/68 (23 Jun 2023 11:12) (121/68 - 121/68)  BP(mean): --  RR: 18 (23 Jun 2023 11:12) (18 - 18)  SpO2: 99% (23 Jun 2023 11:12) (99% - 99%)    Parameters below as of 23 Jun 2023 11:12  Patient On (Oxygen Delivery Method): room air    GENERAL: NAD, lying in bed comfortably  HEAD:  Atraumatic, Normocephalic  EYES: EOMI, PERRLA, conjunctiva and sclera clear  ENT: Moist mucous membranes  NECK: Supple, No JVD  CHEST/LUNG: Clear to auscultation bilaterally; No rales, rhonchi, wheezing, or rubs. Unlabored respirations  HEART: Regular rate and rhythm; No murmurs, rubs, or gallops  ABDOMEN: Bowel sounds present; Soft, Nontender, Nondistended. No hepatomegally  EXTREMITIES:  (+) b/l LE edema +2 extending to the knee w/ b/l non-healing wounds w/ granulation tissue. RLE with weeping edema.   NERVOUS SYSTEM:  Alert & Oriented X3, speech clear. No deficits   MSK: FROM all 4 extremities, full and equal strength  SKIN: No rashes or lesions You can access the FollowMyHealth Patient Portal offered by [facility] by registering at the following website: [email or web address]. By joining [email or web address]’s FollowMyHealth portal, you will also be able to view your health information using other applications (apps) compatible with our system.

## 2023-06-23 NOTE — ED ADULT NURSE NOTE - PATIENT'S SEXUAL ORIENTATION
Heterosexual Full Thickness Lip Wedge Repair (Flap) Text: Given the location of the defect and the proximity to free margins a full thickness wedge repair was deemed most appropriate.  Using a sterile surgical marker, the appropriate repair was drawn incorporating the defect and placing the expected incisions perpendicular to the vermilion border.  The vermilion border was also meticulously outlined to ensure appropriate reapproximation during the repair.  The area thus outlined was incised through and through with a #15 scalpel blade.  The muscularis and dermis were reaproximated with deep sutures following hemostasis. Care was taken to realign the vermilion border before proceeding with the superficial closure.  Once the vermilion was realigned the superfical and mucosal closure was finished.

## 2023-06-23 NOTE — ED ADULT TRIAGE NOTE - CHIEF COMPLAINT QUOTE
was here last week for Cellulitis of both legs , refused admission sent home on Clindamycin   was told to follow up today states legs not better

## 2023-06-23 NOTE — PATIENT PROFILE ADULT - STATED REASON FOR ADMISSION
Fell at home on 6/4 after tripping over exercise machine; Sent here by PCP Dr. Jovany Puckett because infection was not improving from home ABX

## 2023-06-23 NOTE — ED ADULT NURSE NOTE - CAS TRG GENERAL AIRWAY, MLM
[facility] Nurse Msg Pool             Hello,     The  authorization request for Eran Barrios has been denied .   The provider (MD, PA, or NP) will need to complete a peer to peer.       The scan has been denied because One of the following must be met. -Your doctor found that you did not improve after a recent six week trial of doctor prescribed treatment during follow up contact (in person, by phone, by mail, or by messaging). By recent, we mean within three months. -You have a sign or symptom that suggests a severe problem (serious underlying condition) for which a trial of treatment is not needed. Your records do not show that one of these applies to you..     The following information may be required to complete the peer to peer:       Insurance: [facility]   Phone number: [phone]   Date of scan: 11/12/2020   ID Number: [id number]   Case Number: [id number]   CPT Code(s): 66338   Description: MRI lumbar spine without contrast     Please reply to O PRE SERVICE MSG POOL [[id number]] by 11/13/20 with peer to peer results or with the provider decision     Thank you     Preservice         Patent

## 2023-06-23 NOTE — PATIENT PROFILE ADULT - FUNCTIONAL ASSESSMENT - BASIC MOBILITY 6.
4-calculated by average/Not able to assess (calculate score using [facility] averaging method)

## 2023-06-23 NOTE — H&P ADULT - ASSESSMENT
82M from home, ambulates independently, PMHx HTN, HLD, DM w/ Diabetic Neuropathy, p/w complaining of bilateral lower extremity swelling and pain x 2wk after a mechanical fall, now with non-healing wounds. Aseptic with VSS and no leukocytosis. Admitted for cellulitis requiring IV abx. Podiatry and ID consulted.

## 2023-06-23 NOTE — ED PROVIDER NOTE - OBJECTIVE STATEMENT
82-year-old man presenting complaining of bilateral lower extremity swelling and pain.  It began after he fell over a week ago and sustained some injuries to his legs.  His PMD started him on antibiotics and when that failed he was sent to the emergency room last week to be admitted for IV antibiotics but he signed out AMA because he had personal issues to take care of.  He is now returning today because his continue to have pain and swelling and the wounds are healing despite a different oral antibiotic prescribed after last ER visit.  He is denying any fevers or chills.  He is denying any chest pains or shortness of breath.  He reports that he has had some mild swelling of his legs in the past but they did only become the swollen since his injury.

## 2023-06-24 LAB
A1C WITH ESTIMATED AVERAGE GLUCOSE RESULT: 7.1 % — HIGH (ref 4–5.6)
ALBUMIN SERPL ELPH-MCNC: 3 G/DL — LOW (ref 3.5–5)
ALP SERPL-CCNC: 64 U/L — SIGNIFICANT CHANGE UP (ref 40–120)
ALT FLD-CCNC: 23 U/L DA — SIGNIFICANT CHANGE UP (ref 10–60)
ANION GAP SERPL CALC-SCNC: 6 MMOL/L — SIGNIFICANT CHANGE UP (ref 5–17)
AST SERPL-CCNC: 18 U/L — SIGNIFICANT CHANGE UP (ref 10–40)
BASOPHILS # BLD AUTO: 0.01 K/UL — SIGNIFICANT CHANGE UP (ref 0–0.2)
BASOPHILS NFR BLD AUTO: 0.3 % — SIGNIFICANT CHANGE UP (ref 0–2)
BILIRUB SERPL-MCNC: 0.4 MG/DL — SIGNIFICANT CHANGE UP (ref 0.2–1.2)
BUN SERPL-MCNC: 17 MG/DL — SIGNIFICANT CHANGE UP (ref 7–18)
CALCIUM SERPL-MCNC: 8.9 MG/DL — SIGNIFICANT CHANGE UP (ref 8.4–10.5)
CHLORIDE SERPL-SCNC: 110 MMOL/L — HIGH (ref 96–108)
CHOLEST SERPL-MCNC: 110 MG/DL — SIGNIFICANT CHANGE UP
CO2 SERPL-SCNC: 26 MMOL/L — SIGNIFICANT CHANGE UP (ref 22–31)
CREAT SERPL-MCNC: 1.22 MG/DL — SIGNIFICANT CHANGE UP (ref 0.5–1.3)
EGFR: 59 ML/MIN/1.73M2 — LOW
EOSINOPHIL # BLD AUTO: 0.24 K/UL — SIGNIFICANT CHANGE UP (ref 0–0.5)
EOSINOPHIL NFR BLD AUTO: 6.1 % — HIGH (ref 0–6)
ESTIMATED AVERAGE GLUCOSE: 157 MG/DL — HIGH (ref 68–114)
GLUCOSE BLDC GLUCOMTR-MCNC: 117 MG/DL — HIGH (ref 70–99)
GLUCOSE BLDC GLUCOMTR-MCNC: 83 MG/DL — SIGNIFICANT CHANGE UP (ref 70–99)
GLUCOSE BLDC GLUCOMTR-MCNC: 92 MG/DL — SIGNIFICANT CHANGE UP (ref 70–99)
GLUCOSE BLDC GLUCOMTR-MCNC: 94 MG/DL — SIGNIFICANT CHANGE UP (ref 70–99)
GLUCOSE SERPL-MCNC: 74 MG/DL — SIGNIFICANT CHANGE UP (ref 70–99)
HCT VFR BLD CALC: 35.4 % — LOW (ref 39–50)
HDLC SERPL-MCNC: 56 MG/DL — SIGNIFICANT CHANGE UP
HGB BLD-MCNC: 11.4 G/DL — LOW (ref 13–17)
IMM GRANULOCYTES NFR BLD AUTO: 0.3 % — SIGNIFICANT CHANGE UP (ref 0–0.9)
LIPID PNL WITH DIRECT LDL SERPL: 44 MG/DL — SIGNIFICANT CHANGE UP
LYMPHOCYTES # BLD AUTO: 1.19 K/UL — SIGNIFICANT CHANGE UP (ref 1–3.3)
LYMPHOCYTES # BLD AUTO: 30.3 % — SIGNIFICANT CHANGE UP (ref 13–44)
MAGNESIUM SERPL-MCNC: 1.9 MG/DL — SIGNIFICANT CHANGE UP (ref 1.6–2.6)
MCHC RBC-ENTMCNC: 31.1 PG — SIGNIFICANT CHANGE UP (ref 27–34)
MCHC RBC-ENTMCNC: 32.2 GM/DL — SIGNIFICANT CHANGE UP (ref 32–36)
MCV RBC AUTO: 96.5 FL — SIGNIFICANT CHANGE UP (ref 80–100)
MONOCYTES # BLD AUTO: 0.42 K/UL — SIGNIFICANT CHANGE UP (ref 0–0.9)
MONOCYTES NFR BLD AUTO: 10.7 % — SIGNIFICANT CHANGE UP (ref 2–14)
NEUTROPHILS # BLD AUTO: 2.06 K/UL — SIGNIFICANT CHANGE UP (ref 1.8–7.4)
NEUTROPHILS NFR BLD AUTO: 52.3 % — SIGNIFICANT CHANGE UP (ref 43–77)
NON HDL CHOLESTEROL: 54 MG/DL — SIGNIFICANT CHANGE UP
NRBC # BLD: 0 /100 WBCS — SIGNIFICANT CHANGE UP (ref 0–0)
PHOSPHATE SERPL-MCNC: 3.6 MG/DL — SIGNIFICANT CHANGE UP (ref 2.5–4.5)
PLATELET # BLD AUTO: 162 K/UL — SIGNIFICANT CHANGE UP (ref 150–400)
POTASSIUM SERPL-MCNC: 4 MMOL/L — SIGNIFICANT CHANGE UP (ref 3.5–5.3)
POTASSIUM SERPL-SCNC: 4 MMOL/L — SIGNIFICANT CHANGE UP (ref 3.5–5.3)
PROT SERPL-MCNC: 6.5 G/DL — SIGNIFICANT CHANGE UP (ref 6–8.3)
RBC # BLD: 3.67 M/UL — LOW (ref 4.2–5.8)
RBC # FLD: 13.2 % — SIGNIFICANT CHANGE UP (ref 10.3–14.5)
SODIUM SERPL-SCNC: 142 MMOL/L — SIGNIFICANT CHANGE UP (ref 135–145)
TRIGL SERPL-MCNC: 51 MG/DL — SIGNIFICANT CHANGE UP
WBC # BLD: 3.93 K/UL — SIGNIFICANT CHANGE UP (ref 3.8–10.5)
WBC # FLD AUTO: 3.93 K/UL — SIGNIFICANT CHANGE UP (ref 3.8–10.5)

## 2023-06-24 PROCEDURE — 73590 X-RAY EXAM OF LOWER LEG: CPT | Mod: 26,LT,RT

## 2023-06-24 PROCEDURE — 99222 1ST HOSP IP/OBS MODERATE 55: CPT

## 2023-06-24 PROCEDURE — 99233 SBSQ HOSP IP/OBS HIGH 50: CPT | Mod: GC

## 2023-06-24 RX ORDER — APIXABAN 2.5 MG/1
1 TABLET, FILM COATED ORAL
Refills: 0 | DISCHARGE

## 2023-06-24 RX ORDER — TRAMADOL HYDROCHLORIDE 50 MG/1
50 TABLET ORAL ONCE
Refills: 0 | Status: DISCONTINUED | OUTPATIENT
Start: 2023-06-24 | End: 2023-06-24

## 2023-06-24 RX ORDER — VANCOMYCIN HCL 1 G
VIAL (EA) INTRAVENOUS
Refills: 0 | Status: DISCONTINUED | OUTPATIENT
Start: 2023-06-24 | End: 2023-06-26

## 2023-06-24 RX ORDER — CEFAZOLIN SODIUM 1 G
VIAL (EA) INJECTION
Refills: 0 | Status: DISCONTINUED | OUTPATIENT
Start: 2023-06-24 | End: 2023-06-24

## 2023-06-24 RX ORDER — CEFAZOLIN SODIUM 1 G
2000 VIAL (EA) INJECTION EVERY 12 HOURS
Refills: 0 | Status: DISCONTINUED | OUTPATIENT
Start: 2023-06-24 | End: 2023-06-24

## 2023-06-24 RX ORDER — CEFAZOLIN SODIUM 1 G
2000 VIAL (EA) INJECTION ONCE
Refills: 0 | Status: COMPLETED | OUTPATIENT
Start: 2023-06-24 | End: 2023-06-24

## 2023-06-24 RX ORDER — ACETAMINOPHEN 500 MG
1000 TABLET ORAL ONCE
Refills: 0 | Status: COMPLETED | OUTPATIENT
Start: 2023-06-24 | End: 2023-06-24

## 2023-06-24 RX ORDER — VANCOMYCIN HCL 1 G
750 VIAL (EA) INTRAVENOUS EVERY 12 HOURS
Refills: 0 | Status: DISCONTINUED | OUTPATIENT
Start: 2023-06-24 | End: 2023-06-26

## 2023-06-24 RX ORDER — FUROSEMIDE 40 MG
40 TABLET ORAL DAILY
Refills: 0 | Status: DISCONTINUED | OUTPATIENT
Start: 2023-06-24 | End: 2023-06-27

## 2023-06-24 RX ORDER — APIXABAN 2.5 MG/1
5 TABLET, FILM COATED ORAL EVERY 12 HOURS
Refills: 0 | Status: DISCONTINUED | OUTPATIENT
Start: 2023-06-24 | End: 2023-06-27

## 2023-06-24 RX ORDER — VANCOMYCIN HCL 1 G
750 VIAL (EA) INTRAVENOUS ONCE
Refills: 0 | Status: COMPLETED | OUTPATIENT
Start: 2023-06-24 | End: 2023-06-24

## 2023-06-24 RX ADMIN — Medication 100 MILLIGRAM(S): at 12:11

## 2023-06-24 RX ADMIN — DULOXETINE HYDROCHLORIDE 20 MILLIGRAM(S): 30 CAPSULE, DELAYED RELEASE ORAL at 12:12

## 2023-06-24 RX ADMIN — SODIUM CHLORIDE 100 MILLILITER(S): 9 INJECTION, SOLUTION INTRAVENOUS at 05:16

## 2023-06-24 RX ADMIN — DORZOLAMIDE HYDROCHLORIDE 1 DROP(S): 20 SOLUTION/ DROPS OPHTHALMIC at 05:15

## 2023-06-24 RX ADMIN — GABAPENTIN 300 MILLIGRAM(S): 400 CAPSULE ORAL at 05:15

## 2023-06-24 RX ADMIN — Medication 500 MILLIGRAM(S): at 12:11

## 2023-06-24 RX ADMIN — DORZOLAMIDE HYDROCHLORIDE 1 DROP(S): 20 SOLUTION/ DROPS OPHTHALMIC at 21:46

## 2023-06-24 RX ADMIN — LATANOPROST 1 DROP(S): 0.05 SOLUTION/ DROPS OPHTHALMIC; TOPICAL at 21:46

## 2023-06-24 RX ADMIN — Medication 650 MILLIGRAM(S): at 02:37

## 2023-06-24 RX ADMIN — Medication 20 MILLIGRAM(S): at 05:15

## 2023-06-24 RX ADMIN — TRAMADOL HYDROCHLORIDE 50 MILLIGRAM(S): 50 TABLET ORAL at 18:18

## 2023-06-24 RX ADMIN — Medication 1000 MILLIGRAM(S): at 13:20

## 2023-06-24 RX ADMIN — SIMVASTATIN 20 MILLIGRAM(S): 20 TABLET, FILM COATED ORAL at 21:46

## 2023-06-24 RX ADMIN — Medication 500 MILLIGRAM(S): at 23:03

## 2023-06-24 RX ADMIN — APIXABAN 5 MILLIGRAM(S): 2.5 TABLET, FILM COATED ORAL at 17:19

## 2023-06-24 RX ADMIN — Medication 400 MILLIGRAM(S): at 12:21

## 2023-06-24 RX ADMIN — DORZOLAMIDE HYDROCHLORIDE 1 DROP(S): 20 SOLUTION/ DROPS OPHTHALMIC at 15:56

## 2023-06-24 RX ADMIN — GABAPENTIN 300 MILLIGRAM(S): 400 CAPSULE ORAL at 17:19

## 2023-06-24 RX ADMIN — TRAMADOL HYDROCHLORIDE 50 MILLIGRAM(S): 50 TABLET ORAL at 17:18

## 2023-06-24 RX ADMIN — Medication 650 MILLIGRAM(S): at 03:15

## 2023-06-24 RX ADMIN — TAMSULOSIN HYDROCHLORIDE 0.4 MILLIGRAM(S): 0.4 CAPSULE ORAL at 21:46

## 2023-06-24 NOTE — PROGRESS NOTE ADULT - PROBLEM SELECTOR PLAN 1
p/w worsening LE edema x2wk s/p mechanical fall with b/l anterior shin injuries, now non-healing   (+) b/l LE edema +2 extending to the knee w/ b/l non-healing wounds w/ granulation tissue. RLE with weeping edema.  failed o/p abx on keflex and clindamycin   aseptic appearing, VSS with no leukocytosis which can be falsely low in the setting of recent abx use   start cefazolin   f/u BCx  ID consulted: Dr. Hernandez, will see tomorrow   Podiatry consulted, will see tomorrow p/w worsening LE edema x2wk s/p mechanical fall with b/l anterior shin injuries  (+) b/l LE edema +2 extending to the knee w/ b/l non-healing wounds w/ granulation tissue.  failed o/p abx on keflex and clindamycin   aseptic appearing, VSS with no leukocytosis which can be falsely low in the setting of recent abx use   start cefazolin and vancomycin  f/u BCx  ID consulted: Dr. Hernandez  Podiatry consulted p/w worsening LE edema x2wk s/p mechanical fall with b/l anterior shin injuries  (+) b/l LE edema +2 extending to the knee w/ b/l non-healing wounds w/ granulation tissue.  failed o/p abx on keflex and clindamycin   aseptic appearing, VSS with no leukocytosis which can be falsely low in the setting of recent abx use   start cefazolin and vancomycin (renally dosed)  f/u BCx  ID consulted: Dr. Hernandez  Podiatry consulted p/w worsening LE edema x2wk s/p mechanical fall with b/l anterior shin injuries  (+) b/l LE edema +2 extending to the knee w/ b/l non-healing wounds w/ granulation tissue.  failed o/p abx on keflex and clindamycin   aseptic appearing, VSS with no leukocytosis which can be falsely low in the setting of recent abx use   start cefazolin and vancomycin (renally dosed)  F/U xray tibia/fibula to r/o gas gangrene/ osteomyelitis.   f/u BCx  ID consulted: Dr. Hernandez  Podiatry consulted

## 2023-06-24 NOTE — CONSULT NOTE ADULT - ASSESSMENT
82M from home, ambulates independently, PMHx HTN, HLD, DM w/ Diabetic Neuropathy, p/w complaining of increased bilateral lower extremity swelling and pain x 2wk. following a fall with trauma to his legs about 1wk PTA.  Describes the wounds as non- healing with drainage and worsening swelling and pain despite courses of keflex and clindamycin. Denies fevers, chills, and night sweats. CP or palpitations.  Denies h/o CHF but is on Lasix at home. Reports that he has chronic LE swelling which is worse when on his feet for prolonged time and improves with elevation. Started on vancomycin and cefazolin. Organisms causing cellulitis are usually G+C.     #LE cellulitis superimposed on chronic vascular insufficiency  --d/c cefazolin  --cont. vancomycin  --obtain vancomycin trough  --cont. LE elevation  --vascular consult    #DM--management as per medical team    Chart reviewed, labs reviewed, and discussed Dx and management ranjan Fonseca.

## 2023-06-24 NOTE — PROGRESS NOTE ADULT - PROBLEM SELECTOR PLAN 3
h/o DM on Janumet and Glimepiride   will hold oral dm meds while inpatient   f/u A1c  c/w sliding scale  Adjust insulin as indicated  FS ACHS h/o DM on Janumet and Glimepiride   will hold oral dm meds while inpatient   A1C 7.1  c/w sliding scale  Adjust insulin as indicated  FS ACHS

## 2023-06-24 NOTE — CONSULT NOTE ADULT - ASSESSMENT
Podiatry HPI  82 Male pmhx of HTN, HLD, DM w/ Diabetic Neuropathy, venous insufficiency p/w complaining of bilateral lower extremity swelling and pain x 2wk. Patient is ambulatory and lives at home. Patient states he fell over a machine at home and developed wounds to his anterior leg which have not closed. Patient denies LOC after fall however states he noticed some drainage from the legs. Patient visited his PCP who started him on clindamycin which did not resolve his symptoms. Patient then visited the ED and was admitted for IV abx however had to leave AMA to attend to a late payment at home. Patient does not visit an OP podiatrist or vascular doctor. Denies N/v/f/sob/    HPI:  82M from home, ambulates independently, PMHx HTN, HLD, DM w/ Diabetic Neuropathy, p/w complaining of bilateral lower extremity swelling and pain x 2wk. Reports that it began after he fell over a week ago, tripping over a machine at home and sustained some injuries to his legs. Describes the wounds as non- healing, now draining with fluid with worsening swelling and pain. Reports an increased in pain to swollen legs since his fall.  Denies fevers, chills, and night sweats. He went to his PMD who started him on Clindamycin which he failed, then was sent to the ED last week to be admitted for IV antibiotics but signed out AMA d/t personal issues. States that the wound is still not healing despite extended abx use from the recent ED visit. Denies h/o venous insufficiency, does not follow vascular surgery. No reported SOB, chest pains, or palpitations.  Denies h/o CHF but is on Lasix at home. Reports that he has leg swelling prior to fall worse when on his feet for prolonged time and improve with elevation, now stating the leg swelling is worse since his injury.     (23 Jun 2023 14:57)      PMH:Diabetes    Hypercholesterolemia    Glaucoma    Asthma    HTN (hypertension)      Allergies: No Known Allergies    Medications: acetaminophen     Tablet .. 650 milliGRAM(s) Oral every 6 hours PRN  apixaban 5 milliGRAM(s) Oral every 12 hours  ceFAZolin   IVPB 2000 milliGRAM(s) IV Intermittent once  ceFAZolin   IVPB 2000 milliGRAM(s) IV Intermittent every 12 hours  ceFAZolin   IVPB      dorzolamide 2% Ophthalmic Solution 1 Drop(s) Both EYES three times a day  DULoxetine 20 milliGRAM(s) Oral daily  furosemide    Tablet 40 milliGRAM(s) Oral daily  gabapentin 300 milliGRAM(s) Oral every 12 hours  insulin lispro (ADMELOG) corrective regimen sliding scale   SubCutaneous three times a day before meals  insulin lispro (ADMELOG) corrective regimen sliding scale   SubCutaneous at bedtime  lactated ringers. 1000 milliLiter(s) IV Continuous <Continuous>  latanoprost 0.005% Ophthalmic Solution 1 Drop(s) Both EYES at bedtime  ondansetron Injectable 4 milliGRAM(s) IV Push every 8 hours PRN  simvastatin 20 milliGRAM(s) Oral at bedtime  tamsulosin 0.4 milliGRAM(s) Oral at bedtime  vancomycin  IVPB 750 milliGRAM(s) IV Intermittent once  vancomycin  IVPB      vancomycin  IVPB 750 milliGRAM(s) IV Intermittent every 12 hours    FH:No pertinent family history in first degree relatives      PSX: H/O craniotomy      SH: acetaminophen     Tablet .. 650 milliGRAM(s) Oral every 6 hours PRN  apixaban 5 milliGRAM(s) Oral every 12 hours  ceFAZolin   IVPB 2000 milliGRAM(s) IV Intermittent once  ceFAZolin   IVPB 2000 milliGRAM(s) IV Intermittent every 12 hours  ceFAZolin   IVPB      dorzolamide 2% Ophthalmic Solution 1 Drop(s) Both EYES three times a day  DULoxetine 20 milliGRAM(s) Oral daily  furosemide    Tablet 40 milliGRAM(s) Oral daily  gabapentin 300 milliGRAM(s) Oral every 12 hours  insulin lispro (ADMELOG) corrective regimen sliding scale   SubCutaneous three times a day before meals  insulin lispro (ADMELOG) corrective regimen sliding scale   SubCutaneous at bedtime  lactated ringers. 1000 milliLiter(s) IV Continuous <Continuous>  latanoprost 0.005% Ophthalmic Solution 1 Drop(s) Both EYES at bedtime  ondansetron Injectable 4 milliGRAM(s) IV Push every 8 hours PRN  simvastatin 20 milliGRAM(s) Oral at bedtime  tamsulosin 0.4 milliGRAM(s) Oral at bedtime  vancomycin  IVPB 750 milliGRAM(s) IV Intermittent once  vancomycin  IVPB      vancomycin  IVPB 750 milliGRAM(s) IV Intermittent every 12 hours      Vital Signs Last 24 Hrs  T(C): 36.5 (24 Jun 2023 05:12), Max: 36.8 (23 Jun 2023 18:34)  T(F): 97.7 (24 Jun 2023 05:12), Max: 98.2 (23 Jun 2023 18:34)  HR: 54 (24 Jun 2023 05:12) (54 - 92)  BP: 114/69 (24 Jun 2023 05:12) (114/69 - 136/76)  BP(mean): --  RR: 18 (24 Jun 2023 05:12) (18 - 18)  SpO2: 99% (24 Jun 2023 05:12) (99% - 100%)    Parameters below as of 24 Jun 2023 05:12  Patient On (Oxygen Delivery Method): room air        LABS                        11.4   3.93  )-----------( 162      ( 24 Jun 2023 07:04 )             35.4               06-24    142  |  110<H>  |  17  ----------------------------<  74  4.0   |  26  |  1.22    Ca    8.9      24 Jun 2023 07:04  Phos  3.6     06-24  Mg     1.9     06-24    TPro  6.5  /  Alb  3.0<L>  /  TBili  0.4  /  DBili  x   /  AST  18  /  ALT  23  /  AlkPhos  64  06-24      ROS    PHYSICAL EXAM  LE Focused:    Vasc:  DP and PT fainltly palpable 2/2 to non pitting edema +1, no pedal hair present, b/l edema noted with RLE  Derm: Venous stasis wounds noted to RLE anterior shin measuring approximately 3X2.cm with granular wound bed with mild periwound erythema and diffuse redness LE, no undermining,ptb,flucutance,odor  Neuro: Protective sensation grossly diminished  MSK: ambulatory, ttp  to posterior calf RLE concern for DVT, mild TTP to LLE posterior calf      IMAGING: ?xray  pending xray   pending vascular studies   pending venous duplex B/L LE    CULTURES:   none     A:  b/l venous stasis wounds and cellulitis   r/o b/l LE DVT    P:   Patient evaluated and Chart reviewed   Discussed diagnosis and treatment with patient  Wound flushed with normal saline  Applied mupirocin with adaptic to b/l stasis wounds to anterior shin, DSD, no ace until venous duplex negative for DVT  pending xray tib fib  pending vascular studies   pending venous duplex B/L LE to r.o DVT (pt is on eliquis)  Continue with IV antibiotics As Per ID  WBAT to b/l LE  Discussed importance of daily foot examinations and proper shoe gear and to importance of lower Fasting Blood Glucose levels.   Podiatry to follow while in house.  Discussed with Attending Dr. Ryder SANCHEZM   Podiatry HPI  82 Male pmhx of HTN, HLD, DM w/ Diabetic Neuropathy, venous insufficiency p/w complaining of bilateral lower extremity swelling and pain x 2wk. Patient is ambulatory and lives at home. Patient states he fell over a machine at home and developed wounds to his anterior leg which have not closed. Patient denies LOC after fall however states he noticed some drainage from the legs. Patient visited his PCP who started him on clindamycin which did not resolve his symptoms. Patient then visited the ED and was admitted for IV abx however had to leave AMA to attend to a late payment at home. Patient does not visit an OP podiatrist or vascular doctor. Denies N/v/f/sob/    HPI:  82M from home, ambulates independently, PMHx HTN, HLD, DM w/ Diabetic Neuropathy, p/w complaining of bilateral lower extremity swelling and pain x 2wk. Reports that it began after he fell over a week ago, tripping over a machine at home and sustained some injuries to his legs. Describes the wounds as non- healing, now draining with fluid with worsening swelling and pain. Reports an increased in pain to swollen legs since his fall.  Denies fevers, chills, and night sweats. He went to his PMD who started him on Clindamycin which he failed, then was sent to the ED last week to be admitted for IV antibiotics but signed out AMA d/t personal issues. States that the wound is still not healing despite extended abx use from the recent ED visit. Denies h/o venous insufficiency, does not follow vascular surgery. No reported SOB, chest pains, or palpitations.  Denies h/o CHF but is on Lasix at home. Reports that he has leg swelling prior to fall worse when on his feet for prolonged time and improve with elevation, now stating the leg swelling is worse since his injury.     (23 Jun 2023 14:57)      PMH:Diabetes    Hypercholesterolemia    Glaucoma    Asthma    HTN (hypertension)      Allergies: No Known Allergies    Medications: acetaminophen     Tablet .. 650 milliGRAM(s) Oral every 6 hours PRN  apixaban 5 milliGRAM(s) Oral every 12 hours  ceFAZolin   IVPB 2000 milliGRAM(s) IV Intermittent once  ceFAZolin   IVPB 2000 milliGRAM(s) IV Intermittent every 12 hours  ceFAZolin   IVPB      dorzolamide 2% Ophthalmic Solution 1 Drop(s) Both EYES three times a day  DULoxetine 20 milliGRAM(s) Oral daily  furosemide    Tablet 40 milliGRAM(s) Oral daily  gabapentin 300 milliGRAM(s) Oral every 12 hours  insulin lispro (ADMELOG) corrective regimen sliding scale   SubCutaneous three times a day before meals  insulin lispro (ADMELOG) corrective regimen sliding scale   SubCutaneous at bedtime  lactated ringers. 1000 milliLiter(s) IV Continuous <Continuous>  latanoprost 0.005% Ophthalmic Solution 1 Drop(s) Both EYES at bedtime  ondansetron Injectable 4 milliGRAM(s) IV Push every 8 hours PRN  simvastatin 20 milliGRAM(s) Oral at bedtime  tamsulosin 0.4 milliGRAM(s) Oral at bedtime  vancomycin  IVPB 750 milliGRAM(s) IV Intermittent once  vancomycin  IVPB      vancomycin  IVPB 750 milliGRAM(s) IV Intermittent every 12 hours    FH:No pertinent family history in first degree relatives      PSX: H/O craniotomy      SH: acetaminophen     Tablet .. 650 milliGRAM(s) Oral every 6 hours PRN  apixaban 5 milliGRAM(s) Oral every 12 hours  ceFAZolin   IVPB 2000 milliGRAM(s) IV Intermittent once  ceFAZolin   IVPB 2000 milliGRAM(s) IV Intermittent every 12 hours  ceFAZolin   IVPB      dorzolamide 2% Ophthalmic Solution 1 Drop(s) Both EYES three times a day  DULoxetine 20 milliGRAM(s) Oral daily  furosemide    Tablet 40 milliGRAM(s) Oral daily  gabapentin 300 milliGRAM(s) Oral every 12 hours  insulin lispro (ADMELOG) corrective regimen sliding scale   SubCutaneous three times a day before meals  insulin lispro (ADMELOG) corrective regimen sliding scale   SubCutaneous at bedtime  lactated ringers. 1000 milliLiter(s) IV Continuous <Continuous>  latanoprost 0.005% Ophthalmic Solution 1 Drop(s) Both EYES at bedtime  ondansetron Injectable 4 milliGRAM(s) IV Push every 8 hours PRN  simvastatin 20 milliGRAM(s) Oral at bedtime  tamsulosin 0.4 milliGRAM(s) Oral at bedtime  vancomycin  IVPB 750 milliGRAM(s) IV Intermittent once  vancomycin  IVPB      vancomycin  IVPB 750 milliGRAM(s) IV Intermittent every 12 hours      Vital Signs Last 24 Hrs  T(C): 36.5 (24 Jun 2023 05:12), Max: 36.8 (23 Jun 2023 18:34)  T(F): 97.7 (24 Jun 2023 05:12), Max: 98.2 (23 Jun 2023 18:34)  HR: 54 (24 Jun 2023 05:12) (54 - 92)  BP: 114/69 (24 Jun 2023 05:12) (114/69 - 136/76)  BP(mean): --  RR: 18 (24 Jun 2023 05:12) (18 - 18)  SpO2: 99% (24 Jun 2023 05:12) (99% - 100%)    Parameters below as of 24 Jun 2023 05:12  Patient On (Oxygen Delivery Method): room air        LABS                        11.4   3.93  )-----------( 162      ( 24 Jun 2023 07:04 )             35.4               06-24    142  |  110<H>  |  17  ----------------------------<  74  4.0   |  26  |  1.22    Ca    8.9      24 Jun 2023 07:04  Phos  3.6     06-24  Mg     1.9     06-24    TPro  6.5  /  Alb  3.0<L>  /  TBili  0.4  /  DBili  x   /  AST  18  /  ALT  23  /  AlkPhos  64  06-24      ROS    PHYSICAL EXAM  LE Focused:    Vasc:  DP and PT fainltly palpable 2/2 to non pitting edema +1, no pedal hair present, b/l edema noted with RLE  Derm: Venous stasis wounds noted to RLE anterior shin measuring approximately 3X2.cm with granular wound bed with mild periwound erythema and diffuse redness LE, no undermining,ptb,flucutance,odor  Neuro: Protective sensation grossly diminished  MSK: ambulatory, ttp  to posterior calf RLE concern for DVT, mild TTP to LLE posterior calf      IMAGING: ?xray  pending xray   pending vascular studies   pending venous duplex B/L LE    CULTURES:   none     A:  b/l venous stasis wounds and cellulitis   r/o b/l LE DVT    P:   Patient evaluated and Chart reviewed   Discussed diagnosis and treatment with patient  Wound flushed with normal saline  Applied mupirocin with adaptic to b/l stasis wounds to anterior shin, DSD, no ace until venous duplex negative for DVT  pending xray tib fib  pending vascular studies   pending venous duplex B/L LE to r.o DVT (pt is on eliquis)  Continue with IV antibiotics As Per ID  WBAT to b/l LE  Discussed importance of daily foot examinations and proper shoe gear and to importance of lower Fasting Blood Glucose levels.   Podiatry to follow while in house.  discussed and evaluated  with Attending Dr. Ryder ROGER

## 2023-06-24 NOTE — PROGRESS NOTE ADULT - PROBLEM SELECTOR PLAN 2
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

## 2023-06-24 NOTE — PHYSICAL THERAPY INITIAL EVALUATION ADULT - ADDITIONAL COMMENTS
Patient lives in an apartment bl with elevator access. Started using cane in the last 3 weeks due to pain and wound on legs, otherwise Independent in all ADLs and ambulation.

## 2023-06-24 NOTE — CONSULT NOTE ADULT - ATTENDING COMMENTS
I, Dr. Kenny Soler, personally performed the services described in this documentary. All medical records entries made by the resident were at my direction and in my presence. I have reviewed the chart and agree that the record reflects my personal performance and is accurate and complete.

## 2023-06-24 NOTE — CONSULT NOTE ADULT - SUBJECTIVE AND OBJECTIVE BOX
HPI:  82M from home, ambulates independently, PMHx HTN, HLD, DM w/ Diabetic Neuropathy, p/w complaining of bilateral lower extremity swelling and pain x 2wk. Reports that it began after he fell over a week ago, tripping over a machine at home and sustained some injuries to his legs. Describes the wounds as non- healing, now draining with fluid with worsening swelling and pain. Denies fevers, chills, and night sweats. He went to his PMD who started him on keflex as per patient, then was sent to the ED last week to be admitted for IV antibiotics but signed out AMA d/t personal issues. States that the wound is still not healing despite reportedly additional abx (clindamycin) from the recent ED visit. Denies h/o venous insufficiency; does not follow vascular surgery. No reported SOB, chest pains, or palpitations.  Denies h/o CHF but is on Lasix at home. Reports that he has chronic LE swelling which is worse when on his feet for prolonged time and improves with elevation. Now stating the leg swelling is worse since his injury. Started on vancomycin and cefazolin.      PAST MEDICAL & SURGICAL HISTORY:  Diabetes      Hypercholesterolemia      Glaucoma      Asthma      HTN (hypertension)      H/O craniotomy    Cervical laminectomy in the past          MEDS:  acetaminophen     Tablet .. 650 milliGRAM(s) Oral every 6 hours PRN  apixaban 5 milliGRAM(s) Oral every 12 hours  ceFAZolin   IVPB 2000 milliGRAM(s) IV Intermittent every 12 hours  ceFAZolin   IVPB      dorzolamide 2% Ophthalmic Solution 1 Drop(s) Both EYES three times a day  DULoxetine 20 milliGRAM(s) Oral daily  furosemide    Tablet 40 milliGRAM(s) Oral daily  gabapentin 300 milliGRAM(s) Oral every 12 hours  insulin lispro (ADMELOG) corrective regimen sliding scale   SubCutaneous three times a day before meals  insulin lispro (ADMELOG) corrective regimen sliding scale   SubCutaneous at bedtime  lactated ringers. 1000 milliLiter(s) IV Continuous <Continuous>  latanoprost 0.005% Ophthalmic Solution 1 Drop(s) Both EYES at bedtime  ondansetron Injectable 4 milliGRAM(s) IV Push every 8 hours PRN  simvastatin 20 milliGRAM(s) Oral at bedtime  tamsulosin 0.4 milliGRAM(s) Oral at bedtime  vancomycin  IVPB 750 milliGRAM(s) IV Intermittent every 12 hours  vancomycin  IVPB          ALLERGIES  No Known Allergies      SOCIAL HISTORY: emigrated from [place] about 60 y ago; no cigs, no ETOH; retired    FAMILY HISTORY: not pertinent to pt's clinical presentation      ROS:    General: no fever or chills	    Skin:  see HPI  	  HEENT: no complaints    Respiratory and Thorax: + SOB (not new)  	  Cardiovascular:	no CP    GI: no N, V, diarrhea	    Genitourinary:	no urinary burning or pain    Musculoskeletal:	 see HPI; c/o post, mid-back pain    Neurological:	no complaints    Endocrine:	DM      PHYSICAL EXAM:    Vital Signs Last 24 Hrs  T(C): 36.5 (24 Jun 2023 05:12), Max: 36.8 (23 Jun 2023 18:34)  T(F): 97.7 (24 Jun 2023 05:12), Max: 98.2 (23 Jun 2023 18:34)  HR: 54 (24 Jun 2023 05:12) (54 - 92)  BP: 114/69 (24 Jun 2023 05:12) (114/69 - 136/76)  BP(mean): --  RR: 18 (24 Jun 2023 05:12) (18 - 18)  SpO2: 99% (24 Jun 2023 05:12) (99% - 100%)    Parameters below as of 24 Jun 2023 05:12  Patient On (Oxygen Delivery Method): room air        Gen: WD B male in NAD    HEENT: NC/AT; conj. clear; mouth--good oral hygiene    Neck: supple; post. , midline cervical scar approx. 4cm long     Lymph Nodes: no enlarged submand, cervical or supraclav LNs    Chest/Thorax: clear to auscultation    Cardiovascular: S1S2 reg with no murmurs, gallops, rubs    ABD: soft and non-tender with active BS    Genitourinary: no hodges in place    Extremities: bilat, 2+ LE swelling and mild erythema noted below knees and into feet; stasis changes noted; open wounds present R mid-tibia and L mid-tibia; very tender to palpation bilat. of entire LE below knees bilat    Neurological: A+O x3; Cr n grossly intact    Skin: see above    Psychiatric: affect appropriate      LABS/DIAGNOSTIC TESTS:                          11.4   3.93  )-----------( 162      ( 24 Jun 2023 07:04 )             35.4     WBC Count: 3.93 K/uL (06-24 @ 07:04)  WBC Count: 4.36 K/uL (06-23 @ 12:06)      06-24    142  |  110<H>  |  17  ----------------------------<  74  4.0   |  26  |  1.22    Ca    8.9      24 Jun 2023 07:04  Phos  3.6     06-24  Mg     1.9     06-24    TPro  6.5  /  Alb  3.0<L>  /  TBili  0.4  /  DBili  x   /  AST  18  /  ALT  23  /  AlkPhos  64  06-24      Urinalysis Basic - ( 24 Jun 2023 07:04 )    Color: x / Appearance: x / SG: x / pH: x  Gluc: 74 mg/dL / Ketone: x  / Bili: x / Urobili: x   Blood: x / Protein: x / Nitrite: x   Leuk Esterase: x / RBC: x / WBC x   Sq Epi: x / Non Sq Epi: x / Bacteria: x        LIVER FUNCTIONS - ( 24 Jun 2023 07:04 )  Alb: 3.0 g/dL / Pro: 6.5 g/dL / ALK PHOS: 64 U/L / ALT: 23 U/L DA / AST: 18 U/L / GGT: x               CULTURES:     Culture - Blood (collected 06-16-23 @ 16:15)  Source: .Blood Blood-Peripheral  Final Report (06-21-23 @ 21:00):    No Growth Final    Culture - Blood (collected 06-16-23 @ 16:05)  Source: .Blood Blood-Peripheral  Final Report (06-21-23 @ 21:00):    No Growth Final    Culture - Urine (collected 06-16-23 @ 16:00)  Source: Clean Catch Clean Catch (Midstream)  Final Report (06-18-23 @ 15:14):    **Please Note**: This is a Corrected Report**    <10,000 CFU/ml Normal Urogenital jai present    Previously reported as:    10,000 - 49,000 CFU/mL Gram Negative Rods  Organism: Pseudomonas aeruginosa (06-19-23 @ 22:01)  Organism: Pseudomonas aeruginosa (06-19-23 @ 22:01)      -  Levofloxacin: S <=0.5      -  Tobramycin: S <=2      -  Aztreonam: S <=4      -  Gentamicin: S 4      -  Cefepime: S <=2      -  Piperacillin/Tazobactam: S <=8      -  Ciprofloxacin: S <=0.25      -  Imipenem: S <=1      Method Type: MELITA      -  Meropenem: S <=1      -  Ceftazidime: S 8      -  Amikacin: S <=16        RADIOLOGY               HPI:  82M from home, ambulates independently, PMHx HTN, HLD, DM w/ Diabetic Neuropathy, p/w complaining of bilateral lower extremity swelling and pain x 2wk. Reports that it began after he fell over a week ago, tripping over a machine at home and sustained some injuries to his legs. Describes the wounds as non- healing, now draining with fluid with worsening swelling and pain. Denies fevers, chills, and night sweats. He went to his PMD who started him on keflex as per patient, then was sent to the ED last week to be admitted for IV antibiotics but signed out AMA d/t personal issues. States that the wound is still not healing despite reportedly additional abx (clindamycin) from the recent ED visit. Denies h/o venous insufficiency; does not follow vascular surgery. No reported SOB, chest pains, or palpitations.  Denies h/o CHF but is on Lasix at home. Reports that he has chronic LE swelling which is worse when on his feet for prolonged time and improves with elevation. Now stating the leg swelling is worse since his injury. Started on vancomycin and cefazolin.      PAST MEDICAL & SURGICAL HISTORY:  Diabetes      Hypercholesterolemia      Glaucoma      Asthma      HTN (hypertension)      H/O craniotomy    Cervical laminectomy in the past          MEDS:  acetaminophen     Tablet .. 650 milliGRAM(s) Oral every 6 hours PRN  apixaban 5 milliGRAM(s) Oral every 12 hours  ceFAZolin   IVPB 2000 milliGRAM(s) IV Intermittent every 12 hours  ceFAZolin   IVPB      dorzolamide 2% Ophthalmic Solution 1 Drop(s) Both EYES three times a day  DULoxetine 20 milliGRAM(s) Oral daily  furosemide    Tablet 40 milliGRAM(s) Oral daily  gabapentin 300 milliGRAM(s) Oral every 12 hours  insulin lispro (ADMELOG) corrective regimen sliding scale   SubCutaneous three times a day before meals  insulin lispro (ADMELOG) corrective regimen sliding scale   SubCutaneous at bedtime  lactated ringers. 1000 milliLiter(s) IV Continuous <Continuous>  latanoprost 0.005% Ophthalmic Solution 1 Drop(s) Both EYES at bedtime  ondansetron Injectable 4 milliGRAM(s) IV Push every 8 hours PRN  simvastatin 20 milliGRAM(s) Oral at bedtime  tamsulosin 0.4 milliGRAM(s) Oral at bedtime  vancomycin  IVPB 750 milliGRAM(s) IV Intermittent every 12 hours  vancomycin  IVPB          ALLERGIES  No Known Allergies      SOCIAL HISTORY: emigrated from [place] about 60 y ago; no cigs, no ETOH; retired    FAMILY HISTORY: not pertinent to pt's clinical presentation      ROS:    General: no fever or chills	    Skin:  see HPI  	  HEENT: no complaints    Respiratory and Thorax: + SOB (not new)  	  Cardiovascular:	no CP    GI: no N, V, diarrhea	    Genitourinary:	no urinary burning or pain    Musculoskeletal:	 see HPI; c/o post, mid-back pain    Neurological:	no complaints    Endocrine:	DM      PHYSICAL EXAM:    Vital Signs Last 24 Hrs  T(C): 36.5 (24 Jun 2023 05:12), Max: 36.8 (23 Jun 2023 18:34)  T(F): 97.7 (24 Jun 2023 05:12), Max: 98.2 (23 Jun 2023 18:34)  HR: 54 (24 Jun 2023 05:12) (54 - 92)  BP: 114/69 (24 Jun 2023 05:12) (114/69 - 136/76)  BP(mean): --  RR: 18 (24 Jun 2023 05:12) (18 - 18)  SpO2: 99% (24 Jun 2023 05:12) (99% - 100%)    Parameters below as of 24 Jun 2023 05:12  Patient On (Oxygen Delivery Method): room air        Gen: WD B male in NAD    HEENT: NC/AT; conj. clear; mouth--good oral hygiene    Neck: supple; post. , midline cervical scar approx. 4cm long     Lymph Nodes: no enlarged submand, cervical or supraclav LNs    Chest/Thorax: clear to auscultation    Cardiovascular: S1S2 reg with no murmurs, gallops, rubs    ABD: soft and non-tender with active BS    Genitourinary: no hodges in place    Extremities: bilat, 2+ LE swelling and mild erythema noted below knees and into feet; stasis changes noted; open wounds present R mid-tibia and L mid-tibia; very tender to palpation bilat. of entire LE below knees bilat; + mild warmth to palpation of affected areas    Neurological: A+O x3; Cr n grossly intact    Skin: see above    Psychiatric: affect appropriate      LABS/DIAGNOSTIC TESTS:                          11.4   3.93  )-----------( 162      ( 24 Jun 2023 07:04 )             35.4     WBC Count: 3.93 K/uL (06-24 @ 07:04)  WBC Count: 4.36 K/uL (06-23 @ 12:06)      06-24    142  |  110<H>  |  17  ----------------------------<  74  4.0   |  26  |  1.22    Ca    8.9      24 Jun 2023 07:04  Phos  3.6     06-24  Mg     1.9     06-24    TPro  6.5  /  Alb  3.0<L>  /  TBili  0.4  /  DBili  x   /  AST  18  /  ALT  23  /  AlkPhos  64  06-24      Urinalysis Basic - ( 24 Jun 2023 07:04 )    Color: x / Appearance: x / SG: x / pH: x  Gluc: 74 mg/dL / Ketone: x  / Bili: x / Urobili: x   Blood: x / Protein: x / Nitrite: x   Leuk Esterase: x / RBC: x / WBC x   Sq Epi: x / Non Sq Epi: x / Bacteria: x        LIVER FUNCTIONS - ( 24 Jun 2023 07:04 )  Alb: 3.0 g/dL / Pro: 6.5 g/dL / ALK PHOS: 64 U/L / ALT: 23 U/L DA / AST: 18 U/L / GGT: x               CULTURES:     Culture - Blood (collected 06-16-23 @ 16:15)  Source: .Blood Blood-Peripheral  Final Report (06-21-23 @ 21:00):    No Growth Final    Culture - Blood (collected 06-16-23 @ 16:05)  Source: .Blood Blood-Peripheral  Final Report (06-21-23 @ 21:00):    No Growth Final    Culture - Urine (collected 06-16-23 @ 16:00)  Source: Clean Catch Clean Catch (Midstream)  Final Report (06-18-23 @ 15:14):    **Please Note**: This is a Corrected Report**    <10,000 CFU/ml Normal Urogenital jai present    Previously reported as:    10,000 - 49,000 CFU/mL Gram Negative Rods  Organism: Pseudomonas aeruginosa (06-19-23 @ 22:01)  Organism: Pseudomonas aeruginosa (06-19-23 @ 22:01)      -  Levofloxacin: S <=0.5      -  Tobramycin: S <=2      -  Aztreonam: S <=4      -  Gentamicin: S 4      -  Cefepime: S <=2      -  Piperacillin/Tazobactam: S <=8      -  Ciprofloxacin: S <=0.25      -  Imipenem: S <=1      Method Type: MELITA      -  Meropenem: S <=1      -  Ceftazidime: S 8      -  Amikacin: S <=16        RADIOLOGY

## 2023-06-24 NOTE — PROGRESS NOTE ADULT - SUBJECTIVE AND OBJECTIVE BOX
PGY-1 Progress Note discussed with attending    INTERVAL HPI/OVERNIGHT EVENTS:   MEDICATIONS  (STANDING):  dorzolamide 2% Ophthalmic Solution 1 Drop(s) Both EYES three times a day  DULoxetine 20 milliGRAM(s) Oral daily  furosemide    Tablet 20 milliGRAM(s) Oral daily  gabapentin 300 milliGRAM(s) Oral every 12 hours  insulin lispro (ADMELOG) corrective regimen sliding scale   SubCutaneous three times a day before meals  insulin lispro (ADMELOG) corrective regimen sliding scale   SubCutaneous at bedtime  lactated ringers. 1000 milliLiter(s) (100 mL/Hr) IV Continuous <Continuous>  latanoprost 0.005% Ophthalmic Solution 1 Drop(s) Both EYES at bedtime  simvastatin 20 milliGRAM(s) Oral at bedtime  tamsulosin 0.4 milliGRAM(s) Oral at bedtime    MEDICATIONS  (PRN):  acetaminophen     Tablet .. 650 milliGRAM(s) Oral every 6 hours PRN Temp greater or equal to 38C (100.4F), Mild Pain (1 - 3)  ondansetron Injectable 4 milliGRAM(s) IV Push every 8 hours PRN Nausea and/or Vomiting      REVIEW OF SYSTEMS:  CONSTITUTIONAL: No fever, weight loss, or fatigue  RESPIRATORY: No cough, wheezing, chills or hemoptysis; No shortness of breath  CARDIOVASCULAR: No chest pain, palpitations, dizziness, or leg swelling  GASTROINTESTINAL: No abdominal pain. No nausea, vomiting, or hematemesis; No diarrhea or constipation. No melena or hematochezia.  GENITOURINARY: No dysuria or hematuria, urinary frequency  NEUROLOGICAL: No headaches, memory loss, loss of strength, numbness, or tremors  SKIN: No itching, burning, rashes, or lesions     Vital Signs Last 24 Hrs  T(C): 36.5 (24 Jun 2023 05:12), Max: 36.8 (23 Jun 2023 11:12)  T(F): 97.7 (24 Jun 2023 05:12), Max: 98.2 (23 Jun 2023 11:12)  HR: 54 (24 Jun 2023 05:12) (54 - 92)  BP: 114/69 (24 Jun 2023 05:12) (114/69 - 136/76)  BP(mean): --  RR: 18 (24 Jun 2023 05:12) (18 - 18)  SpO2: 99% (24 Jun 2023 05:12) (99% - 100%)    Parameters below as of 24 Jun 2023 05:12  Patient On (Oxygen Delivery Method): room air        PHYSICAL EXAMINATION:  GENERAL: NAD, well built  HEAD:  Atraumatic, Normocephalic  EYES:  conjunctiva and sclera clear  NECK: Supple, No JVD, Normal thyroid  CHEST/LUNG: Clear to auscultation. Clear to percussion bilaterally; No rales, rhonchi, wheezing, or rubs  HEART: Regular rate and rhythm; No murmurs, rubs, or gallops  ABDOMEN: Soft, Nontender, Nondistended; Bowel sounds present  NERVOUS SYSTEM:  Alert & Oriented X3,    EXTREMITIES:  2+ Peripheral Pulses, No clubbing, cyanosis, or edema  SKIN: warm dry                          11.4   3.93  )-----------( 162      ( 24 Jun 2023 07:04 )             35.4     06-24    142  |  110<H>  |  17  ----------------------------<  74  4.0   |  26  |  1.22    Ca    8.9      24 Jun 2023 07:04  Phos  3.6     06-24  Mg     1.9     06-24    TPro  6.5  /  Alb  3.0<L>  /  TBili  0.4  /  DBili  x   /  AST  18  /  ALT  23  /  AlkPhos  64  06-24    LIVER FUNCTIONS - ( 24 Jun 2023 07:04 )  Alb: 3.0 g/dL / Pro: 6.5 g/dL / ALK PHOS: 64 U/L / ALT: 23 U/L DA / AST: 18 U/L / GGT: x                   CAPILLARY BLOOD GLUCOSE      RADIOLOGY & ADDITIONAL TESTS:                   PGY-1 Progress Note discussed with attending    INTERVAL HPI/OVERNIGHT EVENTS:   No acute overnight events. Patient reports he bilateral burning pain and pins and needle sensation that is not resolving with gabapentin and oral Tylenol. Patient denies any other complains at this time.     MEDICATIONS  (STANDING):  dorzolamide 2% Ophthalmic Solution 1 Drop(s) Both EYES three times a day  DULoxetine 20 milliGRAM(s) Oral daily  furosemide    Tablet 20 milliGRAM(s) Oral daily  gabapentin 300 milliGRAM(s) Oral every 12 hours  insulin lispro (ADMELOG) corrective regimen sliding scale   SubCutaneous three times a day before meals  insulin lispro (ADMELOG) corrective regimen sliding scale   SubCutaneous at bedtime  lactated ringers. 1000 milliLiter(s) (100 mL/Hr) IV Continuous <Continuous>  latanoprost 0.005% Ophthalmic Solution 1 Drop(s) Both EYES at bedtime  simvastatin 20 milliGRAM(s) Oral at bedtime  tamsulosin 0.4 milliGRAM(s) Oral at bedtime    MEDICATIONS  (PRN):  acetaminophen     Tablet .. 650 milliGRAM(s) Oral every 6 hours PRN Temp greater or equal to 38C (100.4F), Mild Pain (1 - 3)  ondansetron Injectable 4 milliGRAM(s) IV Push every 8 hours PRN Nausea and/or Vomiting      REVIEW OF SYSTEMS:  CONSTITUTIONAL: No fever, weight loss, or fatigue  RESPIRATORY: No cough, wheezing, chills or hemoptysis; No shortness of breath  CARDIOVASCULAR: No chest pain, palpitations, dizziness, or leg swelling  GASTROINTESTINAL: No abdominal pain. No nausea, vomiting, or hematemesis; No diarrhea or constipation. No melena or hematochezia.  GENITOURINARY: No dysuria or hematuria, urinary frequency  NEUROLOGICAL: No headaches, memory loss, loss of strength, numbness, or tremors  Musculoskeletal: burning pain bilateral lower extremity  SKIN: No itching, burning, rashes, or lesions     Vital Signs Last 24 Hrs  T(C): 36.5 (24 Jun 2023 05:12), Max: 36.8 (23 Jun 2023 11:12)  T(F): 97.7 (24 Jun 2023 05:12), Max: 98.2 (23 Jun 2023 11:12)  HR: 54 (24 Jun 2023 05:12) (54 - 92)  BP: 114/69 (24 Jun 2023 05:12) (114/69 - 136/76)  BP(mean): --  RR: 18 (24 Jun 2023 05:12) (18 - 18)  SpO2: 99% (24 Jun 2023 05:12) (99% - 100%)    Parameters below as of 24 Jun 2023 05:12  Patient On (Oxygen Delivery Method): room air        PHYSICAL EXAMINATION:  GENERAL: NAD, well built  HEAD:  Atraumatic, Normocephalic  EYES:  conjunctiva and sclera clear  NECK: Supple  CHEST/LUNG: Clear to auscultation. No rales, rhonchi, wheezing, or rubs  HEART: Regular rate and rhythm; No murmurs, rubs, or gallops  ABDOMEN: Soft, Nontender, Nondistended; Bowel sounds present  NERVOUS SYSTEM:  Alert & Oriented X3,    EXTREMITIES:  2+ Peripheral Pulses, b/l LE edema2+,   SKIN: bilateral lower extremity non-healing wound with granulation tissue with surrounding area of erythema, pain, warmth and tenderness.                             11.4   3.93  )-----------( 162      ( 24 Jun 2023 07:04 )             35.4     06-24    142  |  110<H>  |  17  ----------------------------<  74  4.0   |  26  |  1.22    Ca    8.9      24 Jun 2023 07:04  Phos  3.6     06-24  Mg     1.9     06-24    TPro  6.5  /  Alb  3.0<L>  /  TBili  0.4  /  DBili  x   /  AST  18  /  ALT  23  /  AlkPhos  64  06-24    LIVER FUNCTIONS - ( 24 Jun 2023 07:04 )  Alb: 3.0 g/dL / Pro: 6.5 g/dL / ALK PHOS: 64 U/L / ALT: 23 U/L DA / AST: 18 U/L / GGT: x                   CAPILLARY BLOOD GLUCOSE      RADIOLOGY & ADDITIONAL TESTS:

## 2023-06-24 NOTE — PHYSICAL THERAPY INITIAL EVALUATION ADULT - DIAGNOSIS, PT EVAL
Pt. present w/deficits in  Body Structures/Function including strength, balance, pain (on both legs) leading to deficits in performing bed mobility, transfer and ambulation endurance.

## 2023-06-24 NOTE — PHYSICAL THERAPY INITIAL EVALUATION ADULT - GENERAL OBSERVATIONS, REHAB EVAL
Patient was seen for PT evaluation today. EMR, laboratory and radiology results reviewed. Pt received supine in bed, NAD, RN at bedside

## 2023-06-25 LAB
ALBUMIN SERPL ELPH-MCNC: 3.5 G/DL — SIGNIFICANT CHANGE UP (ref 3.5–5)
ALP SERPL-CCNC: 75 U/L — SIGNIFICANT CHANGE UP (ref 40–120)
ALT FLD-CCNC: 25 U/L DA — SIGNIFICANT CHANGE UP (ref 10–60)
ANION GAP SERPL CALC-SCNC: 8 MMOL/L — SIGNIFICANT CHANGE UP (ref 5–17)
AST SERPL-CCNC: 18 U/L — SIGNIFICANT CHANGE UP (ref 10–40)
BASOPHILS # BLD AUTO: 0.01 K/UL — SIGNIFICANT CHANGE UP (ref 0–0.2)
BASOPHILS NFR BLD AUTO: 0.2 % — SIGNIFICANT CHANGE UP (ref 0–2)
BILIRUB SERPL-MCNC: 0.5 MG/DL — SIGNIFICANT CHANGE UP (ref 0.2–1.2)
BUN SERPL-MCNC: 16 MG/DL — SIGNIFICANT CHANGE UP (ref 7–18)
CALCIUM SERPL-MCNC: 9.3 MG/DL — SIGNIFICANT CHANGE UP (ref 8.4–10.5)
CHLORIDE SERPL-SCNC: 108 MMOL/L — SIGNIFICANT CHANGE UP (ref 96–108)
CO2 SERPL-SCNC: 24 MMOL/L — SIGNIFICANT CHANGE UP (ref 22–31)
CREAT SERPL-MCNC: 1.24 MG/DL — SIGNIFICANT CHANGE UP (ref 0.5–1.3)
EGFR: 58 ML/MIN/1.73M2 — LOW
EOSINOPHIL # BLD AUTO: 0.27 K/UL — SIGNIFICANT CHANGE UP (ref 0–0.5)
EOSINOPHIL NFR BLD AUTO: 6 % — SIGNIFICANT CHANGE UP (ref 0–6)
GLUCOSE BLDC GLUCOMTR-MCNC: 109 MG/DL — HIGH (ref 70–99)
GLUCOSE BLDC GLUCOMTR-MCNC: 115 MG/DL — HIGH (ref 70–99)
GLUCOSE BLDC GLUCOMTR-MCNC: 121 MG/DL — HIGH (ref 70–99)
GLUCOSE BLDC GLUCOMTR-MCNC: 130 MG/DL — HIGH (ref 70–99)
GLUCOSE SERPL-MCNC: 104 MG/DL — HIGH (ref 70–99)
HCT VFR BLD CALC: 38.3 % — LOW (ref 39–50)
HGB BLD-MCNC: 12.3 G/DL — LOW (ref 13–17)
IMM GRANULOCYTES NFR BLD AUTO: 0.2 % — SIGNIFICANT CHANGE UP (ref 0–0.9)
LYMPHOCYTES # BLD AUTO: 1.37 K/UL — SIGNIFICANT CHANGE UP (ref 1–3.3)
LYMPHOCYTES # BLD AUTO: 30.6 % — SIGNIFICANT CHANGE UP (ref 13–44)
MAGNESIUM SERPL-MCNC: 2.1 MG/DL — SIGNIFICANT CHANGE UP (ref 1.6–2.6)
MCHC RBC-ENTMCNC: 30.4 PG — SIGNIFICANT CHANGE UP (ref 27–34)
MCHC RBC-ENTMCNC: 32.1 GM/DL — SIGNIFICANT CHANGE UP (ref 32–36)
MCV RBC AUTO: 94.8 FL — SIGNIFICANT CHANGE UP (ref 80–100)
MONOCYTES # BLD AUTO: 0.42 K/UL — SIGNIFICANT CHANGE UP (ref 0–0.9)
MONOCYTES NFR BLD AUTO: 9.4 % — SIGNIFICANT CHANGE UP (ref 2–14)
NEUTROPHILS # BLD AUTO: 2.39 K/UL — SIGNIFICANT CHANGE UP (ref 1.8–7.4)
NEUTROPHILS NFR BLD AUTO: 53.6 % — SIGNIFICANT CHANGE UP (ref 43–77)
NRBC # BLD: 0 /100 WBCS — SIGNIFICANT CHANGE UP (ref 0–0)
PHOSPHATE SERPL-MCNC: 4 MG/DL — SIGNIFICANT CHANGE UP (ref 2.5–4.5)
PLATELET # BLD AUTO: 180 K/UL — SIGNIFICANT CHANGE UP (ref 150–400)
POTASSIUM SERPL-MCNC: 4 MMOL/L — SIGNIFICANT CHANGE UP (ref 3.5–5.3)
POTASSIUM SERPL-SCNC: 4 MMOL/L — SIGNIFICANT CHANGE UP (ref 3.5–5.3)
PROT SERPL-MCNC: 7.6 G/DL — SIGNIFICANT CHANGE UP (ref 6–8.3)
RBC # BLD: 4.04 M/UL — LOW (ref 4.2–5.8)
RBC # FLD: 13.2 % — SIGNIFICANT CHANGE UP (ref 10.3–14.5)
SODIUM SERPL-SCNC: 140 MMOL/L — SIGNIFICANT CHANGE UP (ref 135–145)
VANCOMYCIN TROUGH SERPL-MCNC: 10.6 UG/ML — SIGNIFICANT CHANGE UP (ref 10–20)
WBC # BLD: 4.47 K/UL — SIGNIFICANT CHANGE UP (ref 3.8–10.5)
WBC # FLD AUTO: 4.47 K/UL — SIGNIFICANT CHANGE UP (ref 3.8–10.5)

## 2023-06-25 PROCEDURE — 99233 SBSQ HOSP IP/OBS HIGH 50: CPT | Mod: GC

## 2023-06-25 PROCEDURE — 93970 EXTREMITY STUDY: CPT | Mod: 26

## 2023-06-25 RX ORDER — POLYETHYLENE GLYCOL 3350 17 G/17G
17 POWDER, FOR SOLUTION ORAL EVERY 24 HOURS
Refills: 0 | Status: DISCONTINUED | OUTPATIENT
Start: 2023-06-25 | End: 2023-06-27

## 2023-06-25 RX ORDER — ALBUTEROL 90 UG/1
2 AEROSOL, METERED ORAL EVERY 6 HOURS
Refills: 0 | Status: DISCONTINUED | OUTPATIENT
Start: 2023-06-25 | End: 2023-06-27

## 2023-06-25 RX ORDER — BUDESONIDE AND FORMOTEROL FUMARATE DIHYDRATE 160; 4.5 UG/1; UG/1
2 AEROSOL RESPIRATORY (INHALATION)
Refills: 0 | Status: DISCONTINUED | OUTPATIENT
Start: 2023-06-25 | End: 2023-06-27

## 2023-06-25 RX ORDER — SENNA PLUS 8.6 MG/1
2 TABLET ORAL AT BEDTIME
Refills: 0 | Status: DISCONTINUED | OUTPATIENT
Start: 2023-06-25 | End: 2023-06-27

## 2023-06-25 RX ADMIN — TAMSULOSIN HYDROCHLORIDE 0.4 MILLIGRAM(S): 0.4 CAPSULE ORAL at 22:12

## 2023-06-25 RX ADMIN — POLYETHYLENE GLYCOL 3350 17 GRAM(S): 17 POWDER, FOR SOLUTION ORAL at 15:48

## 2023-06-25 RX ADMIN — APIXABAN 5 MILLIGRAM(S): 2.5 TABLET, FILM COATED ORAL at 05:25

## 2023-06-25 RX ADMIN — SIMVASTATIN 20 MILLIGRAM(S): 20 TABLET, FILM COATED ORAL at 22:13

## 2023-06-25 RX ADMIN — LATANOPROST 1 DROP(S): 0.05 SOLUTION/ DROPS OPHTHALMIC; TOPICAL at 22:13

## 2023-06-25 RX ADMIN — DORZOLAMIDE HYDROCHLORIDE 1 DROP(S): 20 SOLUTION/ DROPS OPHTHALMIC at 05:24

## 2023-06-25 RX ADMIN — ALBUTEROL 2 PUFF(S): 90 AEROSOL, METERED ORAL at 12:18

## 2023-06-25 RX ADMIN — DULOXETINE HYDROCHLORIDE 20 MILLIGRAM(S): 30 CAPSULE, DELAYED RELEASE ORAL at 12:19

## 2023-06-25 RX ADMIN — GABAPENTIN 300 MILLIGRAM(S): 400 CAPSULE ORAL at 17:03

## 2023-06-25 RX ADMIN — SENNA PLUS 2 TABLET(S): 8.6 TABLET ORAL at 22:12

## 2023-06-25 RX ADMIN — DORZOLAMIDE HYDROCHLORIDE 1 DROP(S): 20 SOLUTION/ DROPS OPHTHALMIC at 13:30

## 2023-06-25 RX ADMIN — APIXABAN 5 MILLIGRAM(S): 2.5 TABLET, FILM COATED ORAL at 17:03

## 2023-06-25 RX ADMIN — Medication 500 MILLIGRAM(S): at 22:13

## 2023-06-25 RX ADMIN — GABAPENTIN 300 MILLIGRAM(S): 400 CAPSULE ORAL at 05:24

## 2023-06-25 RX ADMIN — Medication 500 MILLIGRAM(S): at 12:19

## 2023-06-25 RX ADMIN — BUDESONIDE AND FORMOTEROL FUMARATE DIHYDRATE 2 PUFF(S): 160; 4.5 AEROSOL RESPIRATORY (INHALATION) at 22:13

## 2023-06-25 RX ADMIN — DORZOLAMIDE HYDROCHLORIDE 1 DROP(S): 20 SOLUTION/ DROPS OPHTHALMIC at 22:13

## 2023-06-25 RX ADMIN — Medication 40 MILLIGRAM(S): at 05:24

## 2023-06-25 NOTE — PROGRESS NOTE ADULT - PROBLEM SELECTOR PLAN 2
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

## 2023-06-25 NOTE — PROGRESS NOTE ADULT - PROBLEM SELECTOR PLAN 3
h/o DM on Janumet and Glimepiride   will hold oral dm meds while inpatient   A1C 7.1  c/w sliding scale  Adjust insulin as indicated  FS ACHS

## 2023-06-25 NOTE — PROGRESS NOTE ADULT - PROBLEM SELECTOR PLAN 1
p/w worsening LE edema x2wk s/p mechanical fall with b/l anterior shin injuries  (+) b/l LE edema +2 extending to the knee w/ b/l non-healing wounds w/ granulation tissue.  failed o/p abx on keflex and clindamycin   aseptic appearing, VSS with no leukocytosis which can be falsely low in the setting of recent abx use   start cefazolin and vancomycin (renally dosed)  - F/u xray tibia/fibula to r/o gas gangrene/ osteomyelitis  - F/u Vanc trough  BCx 6/16 NGTD  ID consulted: Dr. Hernandez  Podiatry consulted

## 2023-06-25 NOTE — PROGRESS NOTE ADULT - ASSESSMENT
A:  b/l venous stasis wounds and cellulitis   r/o b/l LE DVT    P:   Patient evaluated and Chart reviewed   Discussed diagnosis and treatment with patient  Applied Santyl, DSD, ACE to b/l LE  Recommend ultrasound RLE to rule out fluid collection to lower leg  pending xray tib fib  pending vascular studies   pending venous duplex B/L LE to r.o DVT (pt is on eliquis)  Continue with IV antibiotics As Per ID  WBAT to b/l LE  Discussed importance of daily foot examinations and proper shoe gear and to importance of lower Fasting Blood Glucose levels.   Podiatry to follow while in house.  Discussed and evaluated bedside with Attending Dr. Ryder SANCHEZM

## 2023-06-25 NOTE — PROGRESS NOTE ADULT - SUBJECTIVE AND OBJECTIVE BOX
Podiatry Interval: Pt evaluated bedside, resting comfortably. States pain to legs is mildly improved today. Denies any other pedal complaints. Denies any constitutional symptoms.     Podiatry HPI  82 Male pmhx of HTN, HLD, DM w/ Diabetic Neuropathy, venous insufficiency p/w complaining of bilateral lower extremity swelling and pain x 2wk. Patient is ambulatory and lives at home. Patient states he fell over a machine at home and developed wounds to his anterior leg which have not closed. Patient denies LOC after fall however states he noticed some drainage from the legs. Patient visited his PCP who started him on clindamycin which did not resolve his symptoms. Patient then visited the ED and was admitted for IV abx however had to leave AMA to attend to a late payment at home. Patient does not visit an OP podiatrist or vascular doctor. Denies N/v/f/sob/    HPI:  82M from home, ambulates independently, PMHx HTN, HLD, DM w/ Diabetic Neuropathy, p/w complaining of bilateral lower extremity swelling and pain x 2wk. Reports that it began after he fell over a week ago, tripping over a machine at home and sustained some injuries to his legs. Describes the wounds as non- healing, now draining with fluid with worsening swelling and pain. Reports an increased in pain to swollen legs since his fall.  Denies fevers, chills, and night sweats. He went to his PMD who started him on Clindamycin which he failed, then was sent to the ED last week to be admitted for IV antibiotics but signed out AMA d/t personal issues. States that the wound is still not healing despite extended abx use from the recent ED visit. Denies h/o venous insufficiency, does not follow vascular surgery. No reported SOB, chest pains, or palpitations.  Denies h/o CHF but is on Lasix at home. Reports that he has leg swelling prior to fall worse when on his feet for prolonged time and improve with elevation, now stating the leg swelling is worse since his injury.     (23 Jun 2023 14:57)      PMH:Diabetes    Hypercholesterolemia    Glaucoma    Asthma    HTN (hypertension)      Allergies: No Known Allergies    Medications acetaminophen     Tablet .. 650 milliGRAM(s) Oral every 6 hours PRN  apixaban 5 milliGRAM(s) Oral every 12 hours  dorzolamide 2% Ophthalmic Solution 1 Drop(s) Both EYES three times a day  DULoxetine 20 milliGRAM(s) Oral daily  furosemide    Tablet 40 milliGRAM(s) Oral daily  gabapentin 300 milliGRAM(s) Oral every 12 hours  insulin lispro (ADMELOG) corrective regimen sliding scale   SubCutaneous three times a day before meals  insulin lispro (ADMELOG) corrective regimen sliding scale   SubCutaneous at bedtime  latanoprost 0.005% Ophthalmic Solution 1 Drop(s) Both EYES at bedtime  ondansetron Injectable 4 milliGRAM(s) IV Push every 8 hours PRN  simvastatin 20 milliGRAM(s) Oral at bedtime  tamsulosin 0.4 milliGRAM(s) Oral at bedtime  vancomycin  IVPB 750 milliGRAM(s) IV Intermittent every 12 hours  vancomycin  IVPB        FHNo pertinent family history in first degree relatives    ,   PMHDiabetes    Hypercholesterolemia    Glaucoma    Asthma    HTN (hypertension)       PSHH/O craniotomy        Labs                          12.3   4.47  )-----------( 180      ( 25 Jun 2023 06:47 )             38.3      06-25    140  |  108  |  16  ----------------------------<  104<H>  4.0   |  24  |  1.24    Ca    9.3      25 Jun 2023 06:47  Phos  4.0     06-25  Mg     2.1     06-25    TPro  7.6  /  Alb  3.5  /  TBili  0.5  /  DBili  x   /  AST  18  /  ALT  25  /  AlkPhos  75  06-25     Vital Signs Last 24 Hrs  T(C): 37.3 (25 Jun 2023 05:23), Max: 37.3 (25 Jun 2023 05:23)  T(F): 99.2 (25 Jun 2023 05:23), Max: 99.2 (25 Jun 2023 05:23)  HR: 74 (25 Jun 2023 05:23) (53 - 74)  BP: 138/72 (25 Jun 2023 05:23) (111/75 - 138/72)  BP(mean): --  RR: 18 (25 Jun 2023 05:23) (18 - 18)  SpO2: 98% (25 Jun 2023 05:23) (98% - 100%)    Parameters below as of 25 Jun 2023 05:23  Patient On (Oxygen Delivery Method): room air      Sedimentation Rate, Erythrocyte: 27 mm/Hr (06-23-23 @ 12:06)         C-Reactive Protein, Serum: <3 mg/L (06-23-23 @ 12:06)   WBC Count: 4.47 K/uL (06-25-23 @ 06:47)      ROS  Unremarkable outside HPI    PHYSICAL EXAM  LE Focused:    Vasc:  DP and PT palpable. b/l LE non pitting edema +1, no pedal hair present  Derm: Venous stasis wounds noted to RLE anterior shin measuring approximately 3X2.cm with granular wound bed with mild periwound erythema and diffuse redness, no undermining, no PTB, no malodor, mild fluctuance vs. edema proximally. LLE stasis wound without drainage, no PTB, no fluctuance  Neuro: Protective sensation grossly diminished  MSK: ambulatory, ttp  to posterior calf RLE concern for DVT, mild TTP to LLE posterior calf      IMAGING: ?xray  pending xray   pending vascular studies   pending venous duplex B/L LE    CULTURES:   none

## 2023-06-25 NOTE — PROGRESS NOTE ADULT - SUBJECTIVE AND OBJECTIVE BOX
PGY-1 Progress Note discussed with attending    PAGER #: [phone]] TILL 5:00 PM  PLEASE CONTACT ON CALL TEAM:  - On Call Team (Please refer to Wil) FROM 5:00 PM - 8:30PM  - Nightfloat Team FROM 8:30 -7:30 AM    INTERVAL HPI/OVERNIGHT EVENTS: Patient seen and examined at bedside. No acute events overnight. No new complaints today.     MEDICATIONS  (STANDING):  apixaban 5 milliGRAM(s) Oral every 12 hours  dorzolamide 2% Ophthalmic Solution 1 Drop(s) Both EYES three times a day  DULoxetine 20 milliGRAM(s) Oral daily  furosemide    Tablet 40 milliGRAM(s) Oral daily  gabapentin 300 milliGRAM(s) Oral every 12 hours  insulin lispro (ADMELOG) corrective regimen sliding scale   SubCutaneous three times a day before meals  insulin lispro (ADMELOG) corrective regimen sliding scale   SubCutaneous at bedtime  latanoprost 0.005% Ophthalmic Solution 1 Drop(s) Both EYES at bedtime  simvastatin 20 milliGRAM(s) Oral at bedtime  tamsulosin 0.4 milliGRAM(s) Oral at bedtime  vancomycin  IVPB 750 milliGRAM(s) IV Intermittent every 12 hours  vancomycin  IVPB        MEDICATIONS  (PRN):  acetaminophen     Tablet .. 650 milliGRAM(s) Oral every 6 hours PRN Temp greater or equal to 38C (100.4F), Mild Pain (1 - 3)  ondansetron Injectable 4 milliGRAM(s) IV Push every 8 hours PRN Nausea and/or Vomiting      REVIEW OF SYSTEMS:  CONSTITUTIONAL: No fever, weight loss, or fatigue  RESPIRATORY: No cough, wheezing, chills or hemoptysis; No shortness of breath  CARDIOVASCULAR: No chest pain, palpitations, dizziness, or leg swelling  GASTROINTESTINAL: No abdominal pain. No nausea, vomiting, or hematemesis; No diarrhea or constipation. No melena or hematochezia.  GENITOURINARY: No dysuria or hematuria, urinary frequency  NEUROLOGICAL: No headaches, memory loss, loss of strength, numbness, or tremors  SKIN: No itching, burning, rashes, or lesions     Vital Signs Last 24 Hrs  T(C): 37.3 (25 Jun 2023 05:23), Max: 37.3 (25 Jun 2023 05:23)  T(F): 99.2 (25 Jun 2023 05:23), Max: 99.2 (25 Jun 2023 05:23)  HR: 74 (25 Jun 2023 05:23) (53 - 74)  BP: 138/72 (25 Jun 2023 05:23) (111/75 - 138/72)  BP(mean): --  RR: 18 (25 Jun 2023 05:23) (18 - 18)  SpO2: 98% (25 Jun 2023 05:23) (98% - 100%)    Parameters below as of 25 Jun 2023 05:23  Patient On (Oxygen Delivery Method): room air        PHYSICAL EXAMINATION:  GENERAL: NAD, well built  HEAD:  Atraumatic, Normocephalic  EYES:  conjunctiva and sclera clear  NECK: Supple  CHEST/LUNG: Clear to auscultation. No rales, rhonchi, wheezing, or rubs  HEART: Regular rate and rhythm; No murmurs, rubs, or gallops  ABDOMEN: Soft, Nontender, Nondistended; Bowel sounds present  NERVOUS SYSTEM:  Alert & Oriented X3,    EXTREMITIES:  2+ Peripheral Pulses, b/l LE edema2+,   SKIN: bilateral lower extremity non-healing wound with granulation tissue with surrounding area of erythema, pain, warmth and tenderness.                           12.3   4.47  )-----------( 180      ( 25 Jun 2023 06:47 )             38.3     06-25    140  |  108  |  16  ----------------------------<  104<H>  4.0   |  24  |  1.24    Ca    9.3      25 Jun 2023 06:47  Phos  4.0     06-25  Mg     2.1     06-25    TPro  7.6  /  Alb  3.5  /  TBili  0.5  /  DBili  x   /  AST  18  /  ALT  25  /  AlkPhos  75  06-25    LIVER FUNCTIONS - ( 25 Jun 2023 06:47 )  Alb: 3.5 g/dL / Pro: 7.6 g/dL / ALK PHOS: 75 U/L / ALT: 25 U/L DA / AST: 18 U/L / GGT: x                   CAPILLARY BLOOD GLUCOSE      RADIOLOGY & ADDITIONAL TESTS:                   PGY-1 Progress Note discussed with attending    PAGER #: [[phone]] TILL 5:00 PM  PLEASE CONTACT ON CALL TEAM:  - On Call Team (Please refer to Wil) FROM 5:00 PM - 8:30PM  - Nightfloat Team FROM 8:30 -7:30 AM    INTERVAL HPI/OVERNIGHT EVENTS: Patient seen and examined at bedside. No acute events overnight. No new complaints today.     MEDICATIONS  (STANDING):  apixaban 5 milliGRAM(s) Oral every 12 hours  dorzolamide 2% Ophthalmic Solution 1 Drop(s) Both EYES three times a day  DULoxetine 20 milliGRAM(s) Oral daily  furosemide    Tablet 40 milliGRAM(s) Oral daily  gabapentin 300 milliGRAM(s) Oral every 12 hours  insulin lispro (ADMELOG) corrective regimen sliding scale   SubCutaneous three times a day before meals  insulin lispro (ADMELOG) corrective regimen sliding scale   SubCutaneous at bedtime  latanoprost 0.005% Ophthalmic Solution 1 Drop(s) Both EYES at bedtime  simvastatin 20 milliGRAM(s) Oral at bedtime  tamsulosin 0.4 milliGRAM(s) Oral at bedtime  vancomycin  IVPB 750 milliGRAM(s) IV Intermittent every 12 hours  vancomycin  IVPB        MEDICATIONS  (PRN):  acetaminophen     Tablet .. 650 milliGRAM(s) Oral every 6 hours PRN Temp greater or equal to 38C (100.4F), Mild Pain (1 - 3)  ondansetron Injectable 4 milliGRAM(s) IV Push every 8 hours PRN Nausea and/or Vomiting      REVIEW OF SYSTEMS:  CONSTITUTIONAL: No fever, weight loss, or fatigue  RESPIRATORY: No cough, wheezing, chills or hemoptysis; No shortness of breath  CARDIOVASCULAR: No chest pain, palpitations, dizziness, or leg swelling  GASTROINTESTINAL: No abdominal pain. No nausea, vomiting, or hematemesis; No diarrhea or constipation. No melena or hematochezia.  GENITOURINARY: No dysuria or hematuria, urinary frequency  NEUROLOGICAL: No headaches, memory loss, loss of strength, numbness, or tremors  SKIN: No itching, burning, rashes, or lesions     Vital Signs Last 24 Hrs  T(C): 37.3 (25 Jun 2023 05:23), Max: 37.3 (25 Jun 2023 05:23)  T(F): 99.2 (25 Jun 2023 05:23), Max: 99.2 (25 Jun 2023 05:23)  HR: 74 (25 Jun 2023 05:23) (53 - 74)  BP: 138/72 (25 Jun 2023 05:23) (111/75 - 138/72)  BP(mean): --  RR: 18 (25 Jun 2023 05:23) (18 - 18)  SpO2: 98% (25 Jun 2023 05:23) (98% - 100%)    Parameters below as of 25 Jun 2023 05:23  Patient On (Oxygen Delivery Method): room air        PHYSICAL EXAMINATION:  GENERAL: NAD, well built  HEAD:  Atraumatic, Normocephalic  EYES:  conjunctiva and sclera clear  NECK: Supple  CHEST/LUNG: Clear to auscultation. No rales, rhonchi, wheezing, or rubs  HEART: Regular rate and rhythm; No murmurs, rubs, or gallops  ABDOMEN: Soft, Nontender, Nondistended; Bowel sounds present  NERVOUS SYSTEM:  Alert & Oriented X3,    EXTREMITIES:  2+ Peripheral Pulses, b/l LE edema2+ up to the proximal knee   SKIN: bilateral lower extremity non-healing wound with granulation tissue with surrounding area of erythema, pain, warmth and tenderness.                           12.3   4.47  )-----------( 180      ( 25 Jun 2023 06:47 )             38.3     06-25    140  |  108  |  16  ----------------------------<  104<H>  4.0   |  24  |  1.24    Ca    9.3      25 Jun 2023 06:47  Phos  4.0     06-25  Mg     2.1     06-25    TPro  7.6  /  Alb  3.5  /  TBili  0.5  /  DBili  x   /  AST  18  /  ALT  25  /  AlkPhos  75  06-25    LIVER FUNCTIONS - ( 25 Jun 2023 06:47 )  Alb: 3.5 g/dL / Pro: 7.6 g/dL / ALK PHOS: 75 U/L / ALT: 25 U/L DA / AST: 18 U/L / GGT: x                   CAPILLARY BLOOD GLUCOSE      RADIOLOGY & ADDITIONAL TESTS:

## 2023-06-26 LAB
ALBUMIN SERPL ELPH-MCNC: 3.4 G/DL — LOW (ref 3.5–5)
ALP SERPL-CCNC: 73 U/L — SIGNIFICANT CHANGE UP (ref 40–120)
ALT FLD-CCNC: 32 U/L DA — SIGNIFICANT CHANGE UP (ref 10–60)
ANION GAP SERPL CALC-SCNC: 7 MMOL/L — SIGNIFICANT CHANGE UP (ref 5–17)
AST SERPL-CCNC: 25 U/L — SIGNIFICANT CHANGE UP (ref 10–40)
BASOPHILS # BLD AUTO: 0.01 K/UL — SIGNIFICANT CHANGE UP (ref 0–0.2)
BASOPHILS NFR BLD AUTO: 0.2 % — SIGNIFICANT CHANGE UP (ref 0–2)
BILIRUB SERPL-MCNC: 0.5 MG/DL — SIGNIFICANT CHANGE UP (ref 0.2–1.2)
BUN SERPL-MCNC: 19 MG/DL — HIGH (ref 7–18)
CALCIUM SERPL-MCNC: 9.3 MG/DL — SIGNIFICANT CHANGE UP (ref 8.4–10.5)
CHLORIDE SERPL-SCNC: 108 MMOL/L — SIGNIFICANT CHANGE UP (ref 96–108)
CO2 SERPL-SCNC: 25 MMOL/L — SIGNIFICANT CHANGE UP (ref 22–31)
CREAT SERPL-MCNC: 1.29 MG/DL — SIGNIFICANT CHANGE UP (ref 0.5–1.3)
EGFR: 55 ML/MIN/1.73M2 — LOW
EOSINOPHIL # BLD AUTO: 0.24 K/UL — SIGNIFICANT CHANGE UP (ref 0–0.5)
EOSINOPHIL NFR BLD AUTO: 5.4 % — SIGNIFICANT CHANGE UP (ref 0–6)
GLUCOSE BLDC GLUCOMTR-MCNC: 118 MG/DL — HIGH (ref 70–99)
GLUCOSE BLDC GLUCOMTR-MCNC: 125 MG/DL — HIGH (ref 70–99)
GLUCOSE BLDC GLUCOMTR-MCNC: 143 MG/DL — HIGH (ref 70–99)
GLUCOSE BLDC GLUCOMTR-MCNC: 190 MG/DL — HIGH (ref 70–99)
GLUCOSE SERPL-MCNC: 116 MG/DL — HIGH (ref 70–99)
HCT VFR BLD CALC: 37.8 % — LOW (ref 39–50)
HGB BLD-MCNC: 12.2 G/DL — LOW (ref 13–17)
IMM GRANULOCYTES NFR BLD AUTO: 0.2 % — SIGNIFICANT CHANGE UP (ref 0–0.9)
LYMPHOCYTES # BLD AUTO: 1.29 K/UL — SIGNIFICANT CHANGE UP (ref 1–3.3)
LYMPHOCYTES # BLD AUTO: 28.9 % — SIGNIFICANT CHANGE UP (ref 13–44)
MAGNESIUM SERPL-MCNC: 2.1 MG/DL — SIGNIFICANT CHANGE UP (ref 1.6–2.6)
MCHC RBC-ENTMCNC: 30.6 PG — SIGNIFICANT CHANGE UP (ref 27–34)
MCHC RBC-ENTMCNC: 32.3 GM/DL — SIGNIFICANT CHANGE UP (ref 32–36)
MCV RBC AUTO: 94.7 FL — SIGNIFICANT CHANGE UP (ref 80–100)
MONOCYTES # BLD AUTO: 0.48 K/UL — SIGNIFICANT CHANGE UP (ref 0–0.9)
MONOCYTES NFR BLD AUTO: 10.7 % — SIGNIFICANT CHANGE UP (ref 2–14)
NEUTROPHILS # BLD AUTO: 2.44 K/UL — SIGNIFICANT CHANGE UP (ref 1.8–7.4)
NEUTROPHILS NFR BLD AUTO: 54.6 % — SIGNIFICANT CHANGE UP (ref 43–77)
NRBC # BLD: 0 /100 WBCS — SIGNIFICANT CHANGE UP (ref 0–0)
PHOSPHATE SERPL-MCNC: 3.9 MG/DL — SIGNIFICANT CHANGE UP (ref 2.5–4.5)
PLATELET # BLD AUTO: 179 K/UL — SIGNIFICANT CHANGE UP (ref 150–400)
POTASSIUM SERPL-MCNC: 3.8 MMOL/L — SIGNIFICANT CHANGE UP (ref 3.5–5.3)
POTASSIUM SERPL-SCNC: 3.8 MMOL/L — SIGNIFICANT CHANGE UP (ref 3.5–5.3)
PROT SERPL-MCNC: 7.4 G/DL — SIGNIFICANT CHANGE UP (ref 6–8.3)
RBC # BLD: 3.99 M/UL — LOW (ref 4.2–5.8)
RBC # FLD: 13 % — SIGNIFICANT CHANGE UP (ref 10.3–14.5)
SODIUM SERPL-SCNC: 140 MMOL/L — SIGNIFICANT CHANGE UP (ref 135–145)
WBC # BLD: 4.47 K/UL — SIGNIFICANT CHANGE UP (ref 3.8–10.5)
WBC # FLD AUTO: 4.47 K/UL — SIGNIFICANT CHANGE UP (ref 3.8–10.5)

## 2023-06-26 PROCEDURE — 93923 UPR/LXTR ART STDY 3+ LVLS: CPT | Mod: 26

## 2023-06-26 PROCEDURE — 99232 SBSQ HOSP IP/OBS MODERATE 35: CPT

## 2023-06-26 PROCEDURE — 99233 SBSQ HOSP IP/OBS HIGH 50: CPT | Mod: GC

## 2023-06-26 RX ORDER — VANCOMYCIN HCL 1 G
1000 VIAL (EA) INTRAVENOUS EVERY 12 HOURS
Refills: 0 | Status: DISCONTINUED | OUTPATIENT
Start: 2023-06-26 | End: 2023-06-26

## 2023-06-26 RX ORDER — CEPHALEXIN 500 MG
250 CAPSULE ORAL EVERY 6 HOURS
Refills: 0 | Status: DISCONTINUED | OUTPATIENT
Start: 2023-06-26 | End: 2023-06-27

## 2023-06-26 RX ADMIN — APIXABAN 5 MILLIGRAM(S): 2.5 TABLET, FILM COATED ORAL at 17:23

## 2023-06-26 RX ADMIN — GABAPENTIN 300 MILLIGRAM(S): 400 CAPSULE ORAL at 05:49

## 2023-06-26 RX ADMIN — BUDESONIDE AND FORMOTEROL FUMARATE DIHYDRATE 2 PUFF(S): 160; 4.5 AEROSOL RESPIRATORY (INHALATION) at 12:11

## 2023-06-26 RX ADMIN — GABAPENTIN 300 MILLIGRAM(S): 400 CAPSULE ORAL at 17:22

## 2023-06-26 RX ADMIN — Medication 40 MILLIGRAM(S): at 05:49

## 2023-06-26 RX ADMIN — LATANOPROST 1 DROP(S): 0.05 SOLUTION/ DROPS OPHTHALMIC; TOPICAL at 21:45

## 2023-06-26 RX ADMIN — Medication 250 MILLIGRAM(S): at 23:34

## 2023-06-26 RX ADMIN — SIMVASTATIN 20 MILLIGRAM(S): 20 TABLET, FILM COATED ORAL at 21:45

## 2023-06-26 RX ADMIN — SENNA PLUS 2 TABLET(S): 8.6 TABLET ORAL at 21:45

## 2023-06-26 RX ADMIN — Medication 250 MILLIGRAM(S): at 12:12

## 2023-06-26 RX ADMIN — DULOXETINE HYDROCHLORIDE 20 MILLIGRAM(S): 30 CAPSULE, DELAYED RELEASE ORAL at 12:11

## 2023-06-26 RX ADMIN — TAMSULOSIN HYDROCHLORIDE 0.4 MILLIGRAM(S): 0.4 CAPSULE ORAL at 21:43

## 2023-06-26 RX ADMIN — BUDESONIDE AND FORMOTEROL FUMARATE DIHYDRATE 2 PUFF(S): 160; 4.5 AEROSOL RESPIRATORY (INHALATION) at 23:34

## 2023-06-26 RX ADMIN — POLYETHYLENE GLYCOL 3350 17 GRAM(S): 17 POWDER, FOR SOLUTION ORAL at 17:22

## 2023-06-26 RX ADMIN — DORZOLAMIDE HYDROCHLORIDE 1 DROP(S): 20 SOLUTION/ DROPS OPHTHALMIC at 21:46

## 2023-06-26 RX ADMIN — APIXABAN 5 MILLIGRAM(S): 2.5 TABLET, FILM COATED ORAL at 05:49

## 2023-06-26 RX ADMIN — Medication 250 MILLIGRAM(S): at 17:23

## 2023-06-26 RX ADMIN — DORZOLAMIDE HYDROCHLORIDE 1 DROP(S): 20 SOLUTION/ DROPS OPHTHALMIC at 05:49

## 2023-06-26 RX ADMIN — DORZOLAMIDE HYDROCHLORIDE 1 DROP(S): 20 SOLUTION/ DROPS OPHTHALMIC at 13:01

## 2023-06-26 NOTE — PROGRESS NOTE ADULT - ASSESSMENT
82M from home, ambulates independently, PMHx HTN, HLD, DM w/ Diabetic Neuropathy, p/w complaining of increased bilateral lower extremity swelling and pain x 2wk. following a fall with trauma to his legs about 1wk PTA.  Describes the wounds as non- healing with drainage and worsening swelling and pain despite courses of keflex and clindamycin. Denies fevers, chills, and night sweats. CP or palpitations.  Denies h/o CHF but is on Lasix at home. Reports that he has chronic LE swelling which is worse when on his feet for prolonged time and improves with elevation. Of major concern is presence of bilat DVTs with possible acute thrombus on top of chronic thrombus. Has been on vancomycin for superimposed cellulitis. LEs with reduced swelling, erythema, and no further warmth.    #LE cellulitis superimposed on chronic vascular insufficiency/LE DVTs--clinically better  --d/c vancomycin  --Rx with cephalexin 250mg p.o. QID for 4d more  --cont. LE elevation  --vascular consult    #DM--management as per medical team    Chart reviewed, labs/imaging reviewed, and discussed Dx and management ranjan Cantu and with the patient. Please call again if needed.

## 2023-06-26 NOTE — PROGRESS NOTE ADULT - SUBJECTIVE AND OBJECTIVE BOX
Subjective/Objective:      MEDS  acetaminophen     Tablet .. 650 milliGRAM(s) Oral every 6 hours PRN  albuterol    90 MICROgram(s) HFA Inhaler 2 Puff(s) Inhalation every 6 hours PRN  apixaban 5 milliGRAM(s) Oral every 12 hours  budesonide 160 MICROgram(s)/formoterol 4.5 MICROgram(s) Inhaler 2 Puff(s) Inhalation two times a day  dorzolamide 2% Ophthalmic Solution 1 Drop(s) Both EYES three times a day  DULoxetine 20 milliGRAM(s) Oral daily  furosemide    Tablet 40 milliGRAM(s) Oral daily  gabapentin 300 milliGRAM(s) Oral every 12 hours  insulin lispro (ADMELOG) corrective regimen sliding scale   SubCutaneous three times a day before meals  insulin lispro (ADMELOG) corrective regimen sliding scale   SubCutaneous at bedtime  latanoprost 0.005% Ophthalmic Solution 1 Drop(s) Both EYES at bedtime  ondansetron Injectable 4 milliGRAM(s) IV Push every 8 hours PRN  polyethylene glycol 3350 17 Gram(s) Oral every 24 hours  senna 2 Tablet(s) Oral at bedtime  simvastatin 20 milliGRAM(s) Oral at bedtime  tamsulosin 0.4 milliGRAM(s) Oral at bedtime  vancomycin  IVPB 1000 milliGRAM(s) IV Intermittent every 12 hours (D3)      PHYSICAL EXAM:    Vital Signs Last 24 Hrs  T(C): 37.1 (26 Jun 2023 05:15), Max: 37.2 (25 Jun 2023 13:22)  T(F): 98.8 (26 Jun 2023 05:15), Max: 98.9 (25 Jun 2023 13:22)  HR: 68 (26 Jun 2023 05:15) (55 - 68)  BP: 124/74 (26 Jun 2023 05:15) (120/67 - 152/77)  BP(mean): --  RR: 18 (26 Jun 2023 05:15) (18 - 18)  SpO2: 98% (26 Jun 2023 05:15) (98% - 99%)    Parameters below as of 26 Jun 2023 05:15  Patient On (Oxygen Delivery Method): room air        GEN:    HEENT:    CHEST/Respiratory:    Cardiovascular:    Abdomen:    Genitourinary:    Extremities:     Neurological:    Skin:      LABS/DIAGNOSTIC TESTS                            12.2   4.47  )-----------( 179      ( 26 Jun 2023 08:10 )             37.8       WBC Count: 4.47 K/uL (06-26 @ 08:10)  WBC Count: 4.47 K/uL (06-25 @ 06:47)  WBC Count: 3.93 K/uL (06-24 @ 07:04)  WBC Count: 4.36 K/uL (06-23 @ 12:06)      06-26    140  |  108  |  19<H>  ----------------------------<  116<H>  3.8   |  25  |  1.29    Ca    9.3      26 Jun 2023 08:10  Phos  3.9     06-26  Mg     2.1     06-26    TPro  7.4  /  Alb  3.4<L>  /  TBili  0.5  /  DBili  x   /  AST  25  /  ALT  32  /  AlkPhos  73  06-26      Urinalysis Basic - ( 26 Jun 2023 08:10 )    Color: x / Appearance: x / SG: x / pH: x  Gluc: 116 mg/dL / Ketone: x  / Bili: x / Urobili: x   Blood: x / Protein: x / Nitrite: x   Leuk Esterase: x / RBC: x / WBC x   Sq Epi: x / Non Sq Epi: x / Bacteria: x            LACTATE:      CULTURES      Culture - Blood (collected 06-16-23 @ 16:15)  Source: .Blood Blood-Peripheral  Final Report (06-21-23 @ 21:00):    No Growth Final    Culture - Blood (collected 06-16-23 @ 16:05)  Source: .Blood Blood-Peripheral  Final Report (06-21-23 @ 21:00):    No Growth Final    Culture - Urine (collected 06-16-23 @ 16:00)  Source: Clean Catch Clean Catch (Midstream)  Final Report (06-18-23 @ 15:14):    **Please Note**: This is a Corrected Report**    <10,000 CFU/ml Normal Urogenital jai present    Previously reported as:    10,000 - 49,000 CFU/mL Gram Negative Rods  Organism: Pseudomonas aeruginosa (06-19-23 @ 22:01)  Organism: Pseudomonas aeruginosa (06-19-23 @ 22:01)      Method Type: MELITA      -  Amikacin: S <=16      -  Aztreonam: S <=4      -  Cefepime: S <=2      -  Ceftazidime: S 8      -  Ciprofloxacin: S <=0.25      -  Gentamicin: S 4      -  Imipenem: S <=1      -  Levofloxacin: S <=0.5      -  Meropenem: S <=1      -  Piperacillin/Tazobactam: S <=8      -  Tobramycin: S <=2          RADIOLOGY  < from: US Duplex Venous Lower Ext Complete, Bilateral (06.25.23 @ 10:34) >    ACC: [id number] EXAM:  US DPLX LWR EXT VEINS COMPL BI   ORDERED BY: EVGENY MARTINEZ     PROCEDURE DATE:  06/25/2023          INTERPRETATION:  CLINICAL INFORMATION: Bilateral calf pain.    COMPARISON: Bilateral lower extremity venous Doppler 6/16/2023 and   6/8/2019    TECHNIQUE: Duplex sonography of the BILATERAL LOWER extremity veins with   color and spectral Doppler, with and without compression.    FINDINGS:    RIGHT:    Deep venous thrombosis in the right common femoral and femoral veins. The   thrombus is at least partially chronic post thrombotic change, however   color flow was better seen in the femoral vein on the prior study;   thrombus was likely present in the right common femoral vein upon   rereview of the images no prior study and differences overall between the   current prior study may be technical, however acute on chronic thrombus   cannot be excluded.    Normal compressibility of the right popliteal vein.    Calf veins could not be evaluated secondary to an open wound and bandage.    LEFT:  Deep venous thrombosis in the left common femoral, femoral, and popliteal   veins. The thrombus is at least partially chronic post thrombotic change,   however thrombus was previously not seen in the common femoral vein and   color flow is better seen in the femoral veins on the prior study;   differences between the current prior study may be technical, however   acute on chronic thrombus cannot be excluded.    Calf veins could not be evaluated secondary to an open woundand bandage.    IMPRESSION:    Deep venous thrombosis in the right common femoral and femoral veins and   left common femoral, femoral, and popliteal veins; at least some of the   thrombus is chronic post thrombotic change, however thrombus was   previously not seen in the left common femoral vein and color flow was   better seen in the femoral veins on the prior study; differences may be   technical, however acute on chronic thrombus cannot be excluded;   follow-up is recommended.    Calf veins could not be evaluated secondary to open wounds and overlying   bandages.                     Subjective/Objective: Pt states he has pain in LE primarily when walking.      MEDS  acetaminophen     Tablet .. 650 milliGRAM(s) Oral every 6 hours PRN  albuterol    90 MICROgram(s) HFA Inhaler 2 Puff(s) Inhalation every 6 hours PRN  apixaban 5 milliGRAM(s) Oral every 12 hours  budesonide 160 MICROgram(s)/formoterol 4.5 MICROgram(s) Inhaler 2 Puff(s) Inhalation two times a day  dorzolamide 2% Ophthalmic Solution 1 Drop(s) Both EYES three times a day  DULoxetine 20 milliGRAM(s) Oral daily  furosemide    Tablet 40 milliGRAM(s) Oral daily  gabapentin 300 milliGRAM(s) Oral every 12 hours  insulin lispro (ADMELOG) corrective regimen sliding scale   SubCutaneous three times a day before meals  insulin lispro (ADMELOG) corrective regimen sliding scale   SubCutaneous at bedtime  latanoprost 0.005% Ophthalmic Solution 1 Drop(s) Both EYES at bedtime  ondansetron Injectable 4 milliGRAM(s) IV Push every 8 hours PRN  polyethylene glycol 3350 17 Gram(s) Oral every 24 hours  senna 2 Tablet(s) Oral at bedtime  simvastatin 20 milliGRAM(s) Oral at bedtime  tamsulosin 0.4 milliGRAM(s) Oral at bedtime  vancomycin  IVPB 1000 milliGRAM(s) IV Intermittent every 12 hours (D3)      PHYSICAL EXAM:    Vital Signs Last 24 Hrs  T(C): 37.1 (26 Jun 2023 05:15), Max: 37.2 (25 Jun 2023 13:22)  T(F): 98.8 (26 Jun 2023 05:15), Max: 98.9 (25 Jun 2023 13:22)  HR: 68 (26 Jun 2023 05:15) (55 - 68)  BP: 124/74 (26 Jun 2023 05:15) (120/67 - 152/77)  BP(mean): --  RR: 18 (26 Jun 2023 05:15) (18 - 18)  SpO2: 98% (26 Jun 2023 05:15) (98% - 99%)    Parameters below as of 26 Jun 2023 05:15  Patient On (Oxygen Delivery Method): room air        Gen: WD B male in NAD    HEENT: NC/AT; conj. clear; mouth--good oral hygiene    Chest/Thorax: sparse expiratory wheezes    Cardiovascular: S1S2 reg with no murmurs, gallops, rubs    ABD: soft and non-tender with active BS    Genitourinary: no hodges in place    Extremities: bilat, 2+ LE swelling and mild erythema noted below knees and into feet; stasis changes noted; open wounds present R mid-tibia and L mid-tibia; very tender to palpation bilat. of entire LE below knees bilat; + mild warmth to palpation of affected areas    Neurological: A+O x3    Skin: see above    Psychiatric: affect appropriate        LABS/DIAGNOSTIC TESTS                          12.2   4.47  )-----------( 179      ( 26 Jun 2023 08:10 )             37.8       WBC Count: 4.47 K/uL (06-26 @ 08:10)  WBC Count: 4.47 K/uL (06-25 @ 06:47)  WBC Count: 3.93 K/uL (06-24 @ 07:04)  WBC Count: 4.36 K/uL (06-23 @ 12:06)      06-26    140  |  108  |  19<H>  ----------------------------<  116<H>  3.8   |  25  |  1.29    Ca    9.3      26 Jun 2023 08:10  Phos  3.9     06-26  Mg     2.1     06-26    TPro  7.4  /  Alb  3.4<L>  /  TBili  0.5  /  DBili  x   /  AST  25  /  ALT  32  /  AlkPhos  73  06-26      Urinalysis Basic - ( 26 Jun 2023 08:10 )    Color: x / Appearance: x / SG: x / pH: x  Gluc: 116 mg/dL / Ketone: x  / Bili: x / Urobili: x   Blood: x / Protein: x / Nitrite: x   Leuk Esterase: x / RBC: x / WBC x   Sq Epi: x / Non Sq Epi: x / Bacteria: x            CULTURES      Culture - Blood (collected 06-16-23 @ 16:15)  Source: .Blood Blood-Peripheral  Final Report (06-21-23 @ 21:00):    No Growth Final    Culture - Blood (collected 06-16-23 @ 16:05)  Source: .Blood Blood-Peripheral  Final Report (06-21-23 @ 21:00):    No Growth Final    Culture - Urine (collected 06-16-23 @ 16:00)  Source: Clean Catch Clean Catch (Midstream)  Final Report (06-18-23 @ 15:14):    **Please Note**: This is a Corrected Report**    <10,000 CFU/ml Normal Urogenital jai present    Previously reported as:    10,000 - 49,000 CFU/mL Gram Negative Rods  Organism: Pseudomonas aeruginosa (06-19-23 @ 22:01)  Organism: Pseudomonas aeruginosa (06-19-23 @ 22:01)      Method Type: MELITA      -  Amikacin: S <=16      -  Aztreonam: S <=4      -  Cefepime: S <=2      -  Ceftazidime: S 8      -  Ciprofloxacin: S <=0.25      -  Gentamicin: S 4      -  Imipenem: S <=1      -  Levofloxacin: S <=0.5      -  Meropenem: S <=1      -  Piperacillin/Tazobactam: S <=8      -  Tobramycin: S <=2          RADIOLOGY  < from: US Duplex Venous Lower Ext Complete, Bilateral (06.25.23 @ 10:34) >    ACC: [id number] EXAM:  US DPLX LWR EXT VEINS COMPL BI   ORDERED BY: EVGENY MARTINEZ     PROCEDURE DATE:  06/25/2023          INTERPRETATION:  CLINICAL INFORMATION: Bilateral calf pain.    COMPARISON: Bilateral lower extremity venous Doppler 6/16/2023 and   6/8/2019    TECHNIQUE: Duplex sonography of the BILATERAL LOWER extremity veins with   color and spectral Doppler, with and without compression.    FINDINGS:    RIGHT:    Deep venous thrombosis in the right common femoral and femoral veins. The   thrombus is at least partially chronic post thrombotic change, however   color flow was better seen in the femoral vein on the prior study;   thrombus was likely present in the right common femoral vein upon   rereview of the images no prior study and differences overall between the   current prior study may be technical, however acute on chronic thrombus   cannot be excluded.    Normal compressibility of the right popliteal vein.    Calf veins could not be evaluated secondary to an open wound and bandage.    LEFT:  Deep venous thrombosis in the left common femoral, femoral, and popliteal   veins. The thrombus is at least partially chronic post thrombotic change,   however thrombus was previously not seen in the common femoral vein and   color flow is better seen in the femoral veins on the prior study;   differences between the current prior study may be technical, however   acute on chronic thrombus cannot be excluded.    Calf veins could not be evaluated secondary to an open woundand bandage.    IMPRESSION:    Deep venous thrombosis in the right common femoral and femoral veins and   left common femoral, femoral, and popliteal veins; at least some of the   thrombus is chronic post thrombotic change, however thrombus was   previously not seen in the left common femoral vein and color flow was   better seen in the femoral veins on the prior study; differences may be   technical, however acute on chronic thrombus cannot be excluded;   follow-up is recommended.    Calf veins could not be evaluated secondary to open wounds and overlying   bandages.                     Subjective/Objective: Pt states he has pain in LE primarily when walking. Says he had been taking eliquis as directed PTA.      MEDS  acetaminophen     Tablet .. 650 milliGRAM(s) Oral every 6 hours PRN  albuterol    90 MICROgram(s) HFA Inhaler 2 Puff(s) Inhalation every 6 hours PRN  apixaban 5 milliGRAM(s) Oral every 12 hours  budesonide 160 MICROgram(s)/formoterol 4.5 MICROgram(s) Inhaler 2 Puff(s) Inhalation two times a day  dorzolamide 2% Ophthalmic Solution 1 Drop(s) Both EYES three times a day  DULoxetine 20 milliGRAM(s) Oral daily  furosemide    Tablet 40 milliGRAM(s) Oral daily  gabapentin 300 milliGRAM(s) Oral every 12 hours  insulin lispro (ADMELOG) corrective regimen sliding scale   SubCutaneous three times a day before meals  insulin lispro (ADMELOG) corrective regimen sliding scale   SubCutaneous at bedtime  latanoprost 0.005% Ophthalmic Solution 1 Drop(s) Both EYES at bedtime  ondansetron Injectable 4 milliGRAM(s) IV Push every 8 hours PRN  polyethylene glycol 3350 17 Gram(s) Oral every 24 hours  senna 2 Tablet(s) Oral at bedtime  simvastatin 20 milliGRAM(s) Oral at bedtime  tamsulosin 0.4 milliGRAM(s) Oral at bedtime  vancomycin  IVPB 1000 milliGRAM(s) IV Intermittent every 12 hours (D3)      PHYSICAL EXAM:    Vital Signs Last 24 Hrs  T(C): 37.1 (26 Jun 2023 05:15), Max: 37.2 (25 Jun 2023 13:22)  T(F): 98.8 (26 Jun 2023 05:15), Max: 98.9 (25 Jun 2023 13:22)  HR: 68 (26 Jun 2023 05:15) (55 - 68)  BP: 124/74 (26 Jun 2023 05:15) (120/67 - 152/77)  BP(mean): --  RR: 18 (26 Jun 2023 05:15) (18 - 18)  SpO2: 98% (26 Jun 2023 05:15) (98% - 99%)    Parameters below as of 26 Jun 2023 05:15  Patient On (Oxygen Delivery Method): room air        Gen: WD B male in NAD    HEENT: NC/AT; conj. clear; mouth--good oral hygiene    Chest/Thorax: sparse expiratory wheezes    Cardiovascular: S1S2 reg with no murmurs, gallops, rubs    ABD: soft and non-tender with active BS    Genitourinary: no hodges in place    Extremities: bilat, 1+ LE swelling noted below knees to distal portion of LE; stasis changes noted; open wounds present R mid-tibia and L mid-tibia with surrounding eschar; erythema present now only around the ulcers; + mild tenderness to palpation bilat. of entire LE below knees bilat; no further increased warmth noted    Neurological: A+O x3    Skin: see above    Psychiatric: affect appropriate        LABS/DIAGNOSTIC TESTS                          12.2   4.47  )-----------( 179      ( 26 Jun 2023 08:10 )             37.8       WBC Count: 4.47 K/uL (06-26 @ 08:10)  WBC Count: 4.47 K/uL (06-25 @ 06:47)  WBC Count: 3.93 K/uL (06-24 @ 07:04)  WBC Count: 4.36 K/uL (06-23 @ 12:06)      06-26    140  |  108  |  19<H>  ----------------------------<  116<H>  3.8   |  25  |  1.29    Ca    9.3      26 Jun 2023 08:10  Phos  3.9     06-26  Mg     2.1     06-26    TPro  7.4  /  Alb  3.4<L>  /  TBili  0.5  /  DBili  x   /  AST  25  /  ALT  32  /  AlkPhos  73  06-26      Urinalysis Basic - ( 26 Jun 2023 08:10 )    Color: x / Appearance: x / SG: x / pH: x  Gluc: 116 mg/dL / Ketone: x  / Bili: x / Urobili: x   Blood: x / Protein: x / Nitrite: x   Leuk Esterase: x / RBC: x / WBC x   Sq Epi: x / Non Sq Epi: x / Bacteria: x            CULTURES      Culture - Blood (collected 06-16-23 @ 16:15)  Source: .Blood Blood-Peripheral  Final Report (06-21-23 @ 21:00):    No Growth Final    Culture - Blood (collected 06-16-23 @ 16:05)  Source: .Blood Blood-Peripheral  Final Report (06-21-23 @ 21:00):    No Growth Final    Culture - Urine (collected 06-16-23 @ 16:00)  Source: Clean Catch Clean Catch (Midstream)  Final Report (06-18-23 @ 15:14):    **Please Note**: This is a Corrected Report**    <10,000 CFU/ml Normal Urogenital jai present    Previously reported as:    10,000 - 49,000 CFU/mL Gram Negative Rods  Organism: Pseudomonas aeruginosa (06-19-23 @ 22:01)  Organism: Pseudomonas aeruginosa (06-19-23 @ 22:01)      Method Type: MELITA      -  Amikacin: S <=16      -  Aztreonam: S <=4      -  Cefepime: S <=2      -  Ceftazidime: S 8      -  Ciprofloxacin: S <=0.25      -  Gentamicin: S 4      -  Imipenem: S <=1      -  Levofloxacin: S <=0.5      -  Meropenem: S <=1      -  Piperacillin/Tazobactam: S <=8      -  Tobramycin: S <=2          RADIOLOGY  < from: US Duplex Venous Lower Ext Complete, Bilateral (06.25.23 @ 10:34) >    ACC: [id number] EXAM:  US DPLX LWR EXT VEINS COMPL BI   ORDERED BY: EVGENY MARTINEZ     PROCEDURE DATE:  06/25/2023          INTERPRETATION:  CLINICAL INFORMATION: Bilateral calf pain.    COMPARISON: Bilateral lower extremity venous Doppler 6/16/2023 and   6/8/2019    TECHNIQUE: Duplex sonography of the BILATERAL LOWER extremity veins with   color and spectral Doppler, with and without compression.    FINDINGS:    RIGHT:    Deep venous thrombosis in the right common femoral and femoral veins. The   thrombus is at least partially chronic post thrombotic change, however   color flow was better seen in the femoral vein on the prior study;   thrombus was likely present in the right common femoral vein upon   rereview of the images no prior study and differences overall between the   current prior study may be technical, however acute on chronic thrombus   cannot be excluded.    Normal compressibility of the right popliteal vein.    Calf veins could not be evaluated secondary to an open wound and bandage.    LEFT:  Deep venous thrombosis in the left common femoral, femoral, and popliteal   veins. The thrombus is at least partially chronic post thrombotic change,   however thrombus was previously not seen in the common femoral vein and   color flow is better seen in the femoral veins on the prior study;   differences between the current prior study may be technical, however   acute on chronic thrombus cannot be excluded.    Calf veins could not be evaluated secondary to an open woundand bandage.    IMPRESSION:    Deep venous thrombosis in the right common femoral and femoral veins and   left common femoral, femoral, and popliteal veins; at least some of the   thrombus is chronic post thrombotic change, however thrombus was   previously not seen in the left common femoral vein and color flow was   better seen in the femoral veins on the prior study; differences may be   technical, however acute on chronic thrombus cannot be excluded;   follow-up is recommended.    Calf veins could not be evaluated secondary to open wounds and overlying   bandages.

## 2023-06-26 NOTE — PROGRESS NOTE ADULT - ASSESSMENT
A:  b/l venous stasis wounds and cellulitis   b/l LE DVT    P:   Patient evaluated and Chart reviewed   Discussed diagnosis and treatment with patient.  Reviewed lower extremity duplex-b/l LE DVT.  Applied mupirocin, DSD, ACE to b/l LE  pending vascular studies   Continue with IV antibiotics As Per ID  WBAT to b/l LE  Discussed importance of daily foot examinations and proper shoe gear and to importance of lower Fasting Blood Glucose levels.   Podiatry to follow while in house.  Seen, reviewed and discussed at bedside with Dr. Mor ROGER  Discussed with Dr. Soler.   A:  b/l venous stasis wounds and cellulitis   b/l LE DVT    P:   Patient evaluated and Chart reviewed   Discussed diagnosis and treatment with patient.  Reviewed lower extremity duplex-b/l LE DVT.  Applied mupirocin, DSD, No ACE to b/l LE  pending vascular studies   Continue with IV antibiotics As Per ID  WBAT to b/l LE  Discussed importance of daily foot examinations and proper shoe gear and to importance of lower Fasting Blood Glucose levels.   Podiatry to follow while in house.  Seen, reviewed and discussed at bedside with Dr. Mor ROGER  Discussed with Dr. Soler.

## 2023-06-26 NOTE — PROGRESS NOTE ADULT - SUBJECTIVE AND OBJECTIVE BOX
Podiatry Interval: Pt evaluated bedside, resting comfortably. Reports experiencing mild pain to b/l stasis wounds. No other acute overnight events. Denies any other pedal complaints. Denies any constitutional symptoms.     Podiatry HPI  82 Male pmhx of HTN, HLD, DM w/ Diabetic Neuropathy, venous insufficiency p/w complaining of bilateral lower extremity swelling and pain x 2wk. Patient is ambulatory and lives at home. Patient states he fell over a machine at home and developed wounds to his anterior leg which have not closed. Patient denies LOC after fall however states he noticed some drainage from the legs. Patient visited his PCP who started him on clindamycin which did not resolve his symptoms. Patient then visited the ED and was admitted for IV abx however had to leave AMA to attend to a late payment at home. Patient does not visit an OP podiatrist or vascular doctor. Denies N/v/f/sob/    HPI:  82M from home, ambulates independently, PMHx HTN, HLD, DM w/ Diabetic Neuropathy, p/w complaining of bilateral lower extremity swelling and pain x 2wk. Reports that it began after he fell over a week ago, tripping over a machine at home and sustained some injuries to his legs. Describes the wounds as non- healing, now draining with fluid with worsening swelling and pain. Reports an increased in pain to swollen legs since his fall.  Denies fevers, chills, and night sweats. He went to his PMD who started him on Clindamycin which he failed, then was sent to the ED last week to be admitted for IV antibiotics but signed out AMA d/t personal issues. States that the wound is still not healing despite extended abx use from the recent ED visit. Denies h/o venous insufficiency, does not follow vascular surgery. No reported SOB, chest pains, or palpitations.  Denies h/o CHF but is on Lasix at home. Reports that he has leg swelling prior to fall worse when on his feet for prolonged time and improve with elevation, now stating the leg swelling is worse since his injury.     (23 Jun 2023 14:57)      PMH:Diabetes    Hypercholesterolemia    Glaucoma    Asthma    HTN (hypertension)      Allergies: No Known Allergies    Patient admits to  (-) Fevers, (-) Chills, (-) Nausea, (-) Vomiting, (-) Shortness of Breath (-) calf pain (-) chest pain     Medications acetaminophen     Tablet .. 650 milliGRAM(s) Oral every 6 hours PRN  albuterol    90 MICROgram(s) HFA Inhaler 2 Puff(s) Inhalation every 6 hours PRN  apixaban 5 milliGRAM(s) Oral every 12 hours  budesonide 160 MICROgram(s)/formoterol 4.5 MICROgram(s) Inhaler 2 Puff(s) Inhalation two times a day  cephalexin 250 milliGRAM(s) Oral every 6 hours  dorzolamide 2% Ophthalmic Solution 1 Drop(s) Both EYES three times a day  DULoxetine 20 milliGRAM(s) Oral daily  furosemide    Tablet 40 milliGRAM(s) Oral daily  gabapentin 300 milliGRAM(s) Oral every 12 hours  insulin lispro (ADMELOG) corrective regimen sliding scale   SubCutaneous three times a day before meals  insulin lispro (ADMELOG) corrective regimen sliding scale   SubCutaneous at bedtime  latanoprost 0.005% Ophthalmic Solution 1 Drop(s) Both EYES at bedtime  ondansetron Injectable 4 milliGRAM(s) IV Push every 8 hours PRN  polyethylene glycol 3350 17 Gram(s) Oral every 24 hours  senna 2 Tablet(s) Oral at bedtime  simvastatin 20 milliGRAM(s) Oral at bedtime  tamsulosin 0.4 milliGRAM(s) Oral at bedtime    FHNo pertinent family history in first degree relatives    ,   PMHDiabetes    Hypercholesterolemia    Glaucoma    Asthma    HTN (hypertension)       PSHH/O craniotomy        Labs                          12.2   4.47  )-----------( 179      ( 26 Jun 2023 08:10 )             37.8      06-26    140  |  108  |  19<H>  ----------------------------<  116<H>  3.8   |  25  |  1.29    Ca    9.3      26 Jun 2023 08:10  Phos  3.9     06-26  Mg     2.1     06-26    TPro  7.4  /  Alb  3.4<L>  /  TBili  0.5  /  DBili  x   /  AST  25  /  ALT  32  /  AlkPhos  73  06-26     Vital Signs Last 24 Hrs  T(C): 37 (26 Jun 2023 13:10), Max: 37.1 (26 Jun 2023 05:15)  T(F): 98.6 (26 Jun 2023 13:10), Max: 98.8 (26 Jun 2023 05:15)  HR: 62 (26 Jun 2023 13:10) (55 - 68)  BP: 123/71 (26 Jun 2023 13:10) (120/67 - 124/74)  BP(mean): --  RR: 18 (26 Jun 2023 13:10) (18 - 18)  SpO2: 97% (26 Jun 2023 13:10) (97% - 99%)    Parameters below as of 26 Jun 2023 13:10  Patient On (Oxygen Delivery Method): room air      Sedimentation Rate, Erythrocyte: 27 mm/Hr (06-23-23 @ 12:06)         C-Reactive Protein, Serum: <3 mg/L (06-23-23 @ 12:06)   WBC Count: 4.47 K/uL (06-26-23 @ 08:10)    ROS  Unremarkable outside HPI    PHYSICAL EXAM  LE Focused:    Vasc:  DP and PT palpable. b/l LE non pitting edema +1, no pedal hair present  Derm: Venous stasis wounds noted to RLE anterior shin measuring approximately 3X2.cm with granular wound bed with mild periwound erythema and diffuse redness, no undermining, no PTB, no malodor, mild fluctuance vs. edema proximally. LLE stasis wound without drainage, no PTB, no fluctuance  Neuro: Protective sensation grossly diminished  MSK: ambulatory, ttp  to posterior calf RLE concern for DVT, mild TTP to LLE posterior calf      IMAGING:  ACC: [id number] EXAM:  US DPLX LWR EXT VEINS COMPL BI   ORDERED BY: EVGENY MARTINEZ     PROCEDURE DATE:  06/25/2023          INTERPRETATION:  CLINICAL INFORMATION: Bilateral calf pain.    COMPARISON: Bilateral lower extremity venous Doppler 6/16/2023 and   6/8/2019    TECHNIQUE: Duplex sonography of the BILATERAL LOWER extremity veins with   color and spectral Doppler, with and without compression.    FINDINGS:    RIGHT:    Deep venous thrombosis in the right common femoral and femoral veins. The   thrombus is at least partially chronic post thrombotic change, however   color flow was better seen in the femoral vein on the prior study;   thrombus was likely present in the right common femoral vein upon   rereview of the images no prior study and differences overall between the   current prior study may be technical, however acute on chronic thrombus   cannot be excluded.    Normal compressibility of the right popliteal vein.    Calf veins could not be evaluated secondary to an open wound and bandage.    LEFT:  Deep venous thrombosis in the left common femoral, femoral, and popliteal   veins. The thrombus is at least partially chronic post thrombotic change,   however thrombus was previously not seen in the common femoral vein and   color flow is better seen in the femoral veins on the prior study;   differences between the current prior study may be technical, however   acute on chronic thrombus cannot be excluded.    Calf veins could not be evaluated secondary to an open woundand bandage.    IMPRESSION:    Deep venous thrombosis in the right common femoral and femoral veins and   left common femoral, femoral, and popliteal veins; at least some of the   thrombus is chronic post thrombotic change, however thrombus was   previously not seen in the left common femoral vein and color flow was   better seen in the femoral veins on the prior study; differences may be   technical, however acute on chronic thrombus cannot be excluded;   follow-up is recommended.    Calf veins could not be evaluated secondary to open wounds and overlying   bandages.    The findings were discussed with Dr. Falcon on 6/25/2023 11:09 AM      ACC: [id number] EXAM:  XR TIB FIB AP LAT 2 VIEWS BI   ORDERED BY: EVGENY MARTINEZ     PROCEDURE DATE:  06/24/2023          INTERPRETATION:  Radiographs of the RIGHT and LEFT tibia and fibula    CLINICAL INFORMATION: Bilateral lower extremity soft tissue wounds.    TECHNIQUE:  Frontal and lateral views of the tibia and fibula were   obtained.    FINDINGS:  No prior studies are available for review.    The RIGHT LEFT tibia and fibula are intact.  No fracture or radiographic osseous lesion.  Bilateral lower extremity soft tissue swelling without subcutaneous air   or radiopaque foreign body..    IMPRESSION:   No acute radiographic osseous pathology.        Pending vascular studies       CULTURES:   none

## 2023-06-26 NOTE — PROGRESS NOTE ADULT - SUBJECTIVE AND OBJECTIVE BOX
PGY-1 Progress Note discussed with attending    INTERVAL HPI/OVERNIGHT EVENTS:   No acute overnight events. Patient reports he feels better and his pain has improved. No other complains were reported by the patient.     MEDICATIONS  (STANDING):  apixaban 5 milliGRAM(s) Oral every 12 hours  budesonide 160 MICROgram(s)/formoterol 4.5 MICROgram(s) Inhaler 2 Puff(s) Inhalation two times a day  dorzolamide 2% Ophthalmic Solution 1 Drop(s) Both EYES three times a day  DULoxetine 20 milliGRAM(s) Oral daily  furosemide    Tablet 40 milliGRAM(s) Oral daily  gabapentin 300 milliGRAM(s) Oral every 12 hours  insulin lispro (ADMELOG) corrective regimen sliding scale   SubCutaneous three times a day before meals  insulin lispro (ADMELOG) corrective regimen sliding scale   SubCutaneous at bedtime  latanoprost 0.005% Ophthalmic Solution 1 Drop(s) Both EYES at bedtime  polyethylene glycol 3350 17 Gram(s) Oral every 24 hours  senna 2 Tablet(s) Oral at bedtime  simvastatin 20 milliGRAM(s) Oral at bedtime  tamsulosin 0.4 milliGRAM(s) Oral at bedtime  vancomycin  IVPB 1000 milliGRAM(s) IV Intermittent every 12 hours    MEDICATIONS  (PRN):  acetaminophen     Tablet .. 650 milliGRAM(s) Oral every 6 hours PRN Temp greater or equal to 38C (100.4F), Mild Pain (1 - 3)  albuterol    90 MICROgram(s) HFA Inhaler 2 Puff(s) Inhalation every 6 hours PRN Shortness of Breath and/or Wheezing  ondansetron Injectable 4 milliGRAM(s) IV Push every 8 hours PRN Nausea and/or Vomiting      REVIEW OF SYSTEMS:  CONSTITUTIONAL: No fever, weight loss, or fatigue  RESPIRATORY: No cough, wheezing, chills or hemoptysis; No shortness of breath  CARDIOVASCULAR: No chest pain, palpitations, dizziness, or leg swelling  GASTROINTESTINAL: No abdominal pain. No nausea, vomiting, or hematemesis; No diarrhea or constipation. No melena or hematochezia.  GENITOURINARY: No dysuria or hematuria, urinary frequency  NEUROLOGICAL: No headaches, memory loss, loss of strength, numbness, or tremors  SKIN: No itching, burning, rashes, or lesions     Vital Signs Last 24 Hrs  T(C): 37.1 (26 Jun 2023 05:15), Max: 37.2 (25 Jun 2023 13:22)  T(F): 98.8 (26 Jun 2023 05:15), Max: 98.9 (25 Jun 2023 13:22)  HR: 68 (26 Jun 2023 05:15) (55 - 68)  BP: 124/74 (26 Jun 2023 05:15) (120/67 - 152/77)  BP(mean): --  RR: 18 (26 Jun 2023 05:15) (18 - 18)  SpO2: 98% (26 Jun 2023 05:15) (98% - 99%)    Parameters below as of 26 Jun 2023 05:15  Patient On (Oxygen Delivery Method): room air        PHYSICAL EXAMINATION:  GENERAL: NAD, well built  HEAD:  Atraumatic, Normocephalic  EYES:  conjunctiva and sclera clear  NECK: Supple  CHEST/LUNG: Clear to auscultation. No rales, rhonchi, wheezing, or rubs  HEART: Regular rate and rhythm; No murmurs, rubs, or gallops  ABDOMEN: Soft, Nontender, Nondistended; Bowel sounds present  NERVOUS SYSTEM:  Alert & Oriented X3,    EXTREMITIES:  2+ Peripheral Pulses, b/l LE edema2+,   SKIN: bilateral lower extremity non-healing wound with granulation tissue with surrounding area of erythema, pain, warmth and tenderness covered by ace bandages.                        12.2   4.47  )-----------( 179      ( 26 Jun 2023 08:10 )             37.8     06-26    140  |  108  |  19<H>  ----------------------------<  116<H>  3.8   |  25  |  1.29    Ca    9.3      26 Jun 2023 08:10  Phos  3.9     06-26  Mg     2.1     06-26    TPro  7.4  /  Alb  3.4<L>  /  TBili  0.5  /  DBili  x   /  AST  25  /  ALT  32  /  AlkPhos  73  06-26    LIVER FUNCTIONS - ( 26 Jun 2023 08:10 )  Alb: 3.4 g/dL / Pro: 7.4 g/dL / ALK PHOS: 73 U/L / ALT: 32 U/L DA / AST: 25 U/L / GGT: x                   CAPILLARY BLOOD GLUCOSE      RADIOLOGY & ADDITIONAL TESTS:

## 2023-06-26 NOTE — PROGRESS NOTE ADULT - PROBLEM SELECTOR PLAN 1
p/w worsening LE edema x2wk s/p mechanical fall with b/l anterior shin injuries.  (+) b/l LE edema +2 extending to the knee w/ b/l non-healing wounds w/ granulation tissue.  failed o/p abx on keflex and clindamycin   aseptic appearing, VSS with no leukocytosis which can be falsely low in the setting of recent abx use   D/C cefazolin   C/w vancomycin (renally dosed)  - xray tibia/fibula to r/o gas gangrene/ osteomyelitis - negative.   - F/U LINDA.   - BCx 6/16 NGTD  - ID consulted: Dr. Hernandez  - Podiatry consulted p/w worsening LE edema x2wk s/p mechanical fall with b/l anterior shin injuries.  (+) b/l LE edema +2 extending to the knee w/ b/l non-healing wounds w/ granulation tissue.  failed o/p abx on keflex and clindamycin   aseptic appearing, VSS with no leukocytosis which can be falsely low in the setting of recent abx use   D/C cefazolin   D/C vancomycin (renally dosed)  Start Cephlexin 250 PO QID.   - xray tibia/fibula to r/o gas gangrene/ osteomyelitis - negative.   - F/U LINDA.   - BCx 6/16 NGTD  - ID consulted: Dr. Hernandez  - Podiatry consulted p/w worsening LE edema x2wk s/p mechanical fall with b/l anterior shin injuries.  (+) b/l LE edema +2 extending to the knee w/ b/l non-healing wounds w/ granulation tissue.  failed o/p abx on keflex and clindamycin   aseptic appearing, VSS with no leukocytosis which can be falsely low in the setting of recent abx use   D/C cefazolin   D/C vancomycin (renally dosed)  Start Cephlexin 250 PO QID till 06/29/2023  - xray tibia/fibula to r/o gas gangrene/ osteomyelitis - negative.   - F/U LINDA.   - BCx 6/16 NGTD  - ID consulted: Dr. Hernandez  - Podiatry consulted p/w worsening LE edema x2wk s/p mechanical fall with b/l anterior shin injuries.  (+) b/l LE edema +2 extending to the knee w/ b/l non-healing wounds w/ granulation tissue.  failed o/p abx on keflex and clindamycin   aseptic appearing, VSS with no leukocytosis which can be falsely low in the setting of recent abx use   D/C cefazolin and vancomycin (renally dosed)  Start Cephlexin 250 PO QID till 06/29/2023  - xray tibia/fibula to r/o gas gangrene/ osteomyelitis - negative.   - F/U LINDA. If positive, vascular consult.   - BCx 6/16 NGTD  - ID consulted: Dr. Hernandez  - Podiatry consulted

## 2023-06-26 NOTE — PROGRESS NOTE ADULT - ATTENDING COMMENTS
I, Dr. Kenny Soler, personally performed the services described in this documentary. All medical records entries made by the resident were at my direction and in my presence. I have reviewed the chart and agree that the record reflects my personal performance and is accurate and complete.
82M from home, ambulates independently, PMHx HTN, HLD, DM w/ Diabetic Neuropathy, Hx bilateral LE DVT on eliquis p/w complaining of bilateral lower extremity swelling and pain x 2wk.  Admit for bilateral extremity wounds with cellulitis, trauma with fall, acute on chronic LE edema, and CKD.    #Bilateral extremity wounds with cellulitis, outpatient treatment failed  #Acute on chronic bilateral LE DVT  #CARINA onCKD  #Trauma with fall  #Acute on chronic LE edema  #Hx bilateral LE DVT on eliquis   #HTN, HLD, DM  Completed and failed outpatient treatment of cephalexin and clindamycin subsequently. Patient with bilateral open wounds with weeping on bilateral shins and surrounding erythema. Will admit for IV abx. LE with 2+ pitting edema to knees which is worse than baseline.  - Obtain LINDA - if positive will consult vascular surgery  - Switch vancomycin to cephalexin for 4 more days  - MRSA swab  - Increase home lasix to lasix 40mg PO during admission for increased swelling  - Cr improved from 1.5 to 1.2 on increased lasix dose  - patient with abdominal distension and constipation   - Started miralax, senna  - US showing bilateral acute on chronic DVT - continue home eliquis  - ID consulted  - Podiatry consulted - xray, LE US  - PT eval - home PT
82M from home, ambulates independently, PMHx HTN, HLD, DM w/ Diabetic Neuropathy, Hx bilateral LE DVT on eliquis p/w complaining of bilateral lower extremity swelling and pain x 2wk.  Admit for bilateral extremity wounds with cellulitis, trauma with fall, acute on chronic LE edema, and CKD.    #Bilateral extremity wounds with cellulitis, outpatient treatment failed  #Acute on chronic bilateral LE DVT  #CARINA onCKD  #Trauma with fall  #Acute on chronic LE edema  #Hx bilateral LE DVT on eliquis   #HTN, HLD, DM  Completed and failed outpatient treatment of cephalexin and clindamycin subsequently. Patient with bilateral open wounds with weeping on bilateral shins and surrounding erythema. Will admit for IV abx. LE with 2+ pitting edema to knees which is worse than baseline.  - patient with abdominal distension and constipation   - Started miralax, senna  - Continue Vancomycin - f/u trough  - MRSA swab  - Increase home lasix to lasix 40mg PO during admission for increased swelling  - Cr improved from 1.5 to 1.2 on increased lasix dose and vancomycin  - US showing bilateral acute on chronic DVT - continue home eliquis  - ID consulted  - Podiatry consulted - xray, LE US  - Restart home eliquis and home antiHTN/HLD medication  - PT eval - home PT
82M from home, ambulates independently, PMHx HTN, HLD, DM w/ Diabetic Neuropathy, Hx bilateral LE DVT on eliquis p/w complaining of bilateral lower extremity swelling and pain x 2wk.  Admit for bilateral extremity wounds with cellulitis, trauma with fall, acute on chronic LE edema, and CKD.    #Bilateral extremity wounds with cellulitis, outpatient treatment failed  #Trauma with fall  #Acute on chronic LE edema  #CKD  #Hx bilateral LE DVT on eliquis   #HTN, HLD, DM  Completed and failed outpatient treatment of cephalexin and clindamycin subsequently. Patient with bilateral open wounds with weeping on bilateral shins and surrounding erythema. Will admit for IV abx. LE with 2+ pitting edema to knees which is worse than baseline.  - Continue Vancomycin - f/u trough  - MRSA swab  - Increase home lasix to lasix 40mg PO during admission for increased swelling  - Baseline Cr 1.4 - monitor in setting of increased lasix and vancomycin  - ID consulted  - Podiatry consulted - xray, LE US  - Restart home eliquis and home antiHTN/HLD medication  - PT eval.

## 2023-06-26 NOTE — PROGRESS NOTE ADULT - PROBLEM SELECTOR PLAN 2
h/o DVT 2019 takes Eliquis   not in respiratory distress, saturating well on RA  US doppler: Deep venous thrombosis in the right common femoral and femoral veins and left common femoral, femoral, and popliteal veins; at least some of the thrombus is chronic post thrombotic change, however thrombus was previously not seen in the left common femoral vein and color flow was better seen in the femoral veins on the prior study.  c/w home meds

## 2023-06-27 ENCOUNTER — TRANSCRIPTION ENCOUNTER (OUTPATIENT)
Age: 83
End: 2023-06-27

## 2023-06-27 VITALS
RESPIRATION RATE: 18 BRPM | TEMPERATURE: 98 F | OXYGEN SATURATION: 96 % | SYSTOLIC BLOOD PRESSURE: 119 MMHG | HEART RATE: 66 BPM | DIASTOLIC BLOOD PRESSURE: 70 MMHG

## 2023-06-27 LAB
ANION GAP SERPL CALC-SCNC: 10 MMOL/L — SIGNIFICANT CHANGE UP (ref 5–17)
BUN SERPL-MCNC: 22 MG/DL — HIGH (ref 7–18)
CALCIUM SERPL-MCNC: 9.3 MG/DL — SIGNIFICANT CHANGE UP (ref 8.4–10.5)
CHLORIDE SERPL-SCNC: 106 MMOL/L — SIGNIFICANT CHANGE UP (ref 96–108)
CO2 SERPL-SCNC: 23 MMOL/L — SIGNIFICANT CHANGE UP (ref 22–31)
CREAT SERPL-MCNC: 1.32 MG/DL — HIGH (ref 0.5–1.3)
EGFR: 54 ML/MIN/1.73M2 — LOW
GLUCOSE BLDC GLUCOMTR-MCNC: 110 MG/DL — HIGH (ref 70–99)
GLUCOSE BLDC GLUCOMTR-MCNC: 231 MG/DL — HIGH (ref 70–99)
GLUCOSE SERPL-MCNC: 155 MG/DL — HIGH (ref 70–99)
HCT VFR BLD CALC: 39.7 % — SIGNIFICANT CHANGE UP (ref 39–50)
HGB BLD-MCNC: 13.1 G/DL — SIGNIFICANT CHANGE UP (ref 13–17)
MAGNESIUM SERPL-MCNC: 2.2 MG/DL — SIGNIFICANT CHANGE UP (ref 1.6–2.6)
MCHC RBC-ENTMCNC: 31.1 PG — SIGNIFICANT CHANGE UP (ref 27–34)
MCHC RBC-ENTMCNC: 33 GM/DL — SIGNIFICANT CHANGE UP (ref 32–36)
MCV RBC AUTO: 94.3 FL — SIGNIFICANT CHANGE UP (ref 80–100)
NRBC # BLD: 0 /100 WBCS — SIGNIFICANT CHANGE UP (ref 0–0)
PHOSPHATE SERPL-MCNC: 3.5 MG/DL — SIGNIFICANT CHANGE UP (ref 2.5–4.5)
PLATELET # BLD AUTO: 189 K/UL — SIGNIFICANT CHANGE UP (ref 150–400)
POTASSIUM SERPL-MCNC: 3.8 MMOL/L — SIGNIFICANT CHANGE UP (ref 3.5–5.3)
POTASSIUM SERPL-SCNC: 3.8 MMOL/L — SIGNIFICANT CHANGE UP (ref 3.5–5.3)
RBC # BLD: 4.21 M/UL — SIGNIFICANT CHANGE UP (ref 4.2–5.8)
RBC # FLD: 13.1 % — SIGNIFICANT CHANGE UP (ref 10.3–14.5)
SODIUM SERPL-SCNC: 139 MMOL/L — SIGNIFICANT CHANGE UP (ref 135–145)
WBC # BLD: 4.68 K/UL — SIGNIFICANT CHANGE UP (ref 3.8–10.5)
WBC # FLD AUTO: 4.68 K/UL — SIGNIFICANT CHANGE UP (ref 3.8–10.5)

## 2023-06-27 PROCEDURE — 93005 ELECTROCARDIOGRAM TRACING: CPT

## 2023-06-27 PROCEDURE — 85025 COMPLETE CBC W/AUTO DIFF WBC: CPT

## 2023-06-27 PROCEDURE — 83036 HEMOGLOBIN GLYCOSYLATED A1C: CPT

## 2023-06-27 PROCEDURE — 96368 THER/DIAG CONCURRENT INF: CPT

## 2023-06-27 PROCEDURE — 73590 X-RAY EXAM OF LOWER LEG: CPT

## 2023-06-27 PROCEDURE — 97162 PT EVAL MOD COMPLEX 30 MIN: CPT

## 2023-06-27 PROCEDURE — 96365 THER/PROPH/DIAG IV INF INIT: CPT

## 2023-06-27 PROCEDURE — 85027 COMPLETE CBC AUTOMATED: CPT

## 2023-06-27 PROCEDURE — 80202 ASSAY OF VANCOMYCIN: CPT

## 2023-06-27 PROCEDURE — 36415 COLL VENOUS BLD VENIPUNCTURE: CPT

## 2023-06-27 PROCEDURE — 82962 GLUCOSE BLOOD TEST: CPT

## 2023-06-27 PROCEDURE — 84100 ASSAY OF PHOSPHORUS: CPT

## 2023-06-27 PROCEDURE — 80053 COMPREHEN METABOLIC PANEL: CPT

## 2023-06-27 PROCEDURE — 85652 RBC SED RATE AUTOMATED: CPT

## 2023-06-27 PROCEDURE — 93923 UPR/LXTR ART STDY 3+ LVLS: CPT

## 2023-06-27 PROCEDURE — 93970 EXTREMITY STUDY: CPT

## 2023-06-27 PROCEDURE — 80061 LIPID PANEL: CPT

## 2023-06-27 PROCEDURE — 94640 AIRWAY INHALATION TREATMENT: CPT

## 2023-06-27 PROCEDURE — 80048 BASIC METABOLIC PNL TOTAL CA: CPT

## 2023-06-27 PROCEDURE — 99285 EMERGENCY DEPT VISIT HI MDM: CPT | Mod: 25

## 2023-06-27 PROCEDURE — 83735 ASSAY OF MAGNESIUM: CPT

## 2023-06-27 PROCEDURE — 99239 HOSP IP/OBS DSCHRG MGMT >30: CPT | Mod: GC

## 2023-06-27 PROCEDURE — 86140 C-REACTIVE PROTEIN: CPT

## 2023-06-27 RX ORDER — FUROSEMIDE 40 MG
1 TABLET ORAL
Qty: 30 | Refills: 0
Start: 2023-06-27 | End: 2023-07-26

## 2023-06-27 RX ORDER — CEPHALEXIN 500 MG
1 CAPSULE ORAL
Qty: 12 | Refills: 0
Start: 2023-06-27 | End: 2023-06-29

## 2023-06-27 RX ORDER — FUROSEMIDE 40 MG
1 TABLET ORAL
Refills: 0 | DISCHARGE

## 2023-06-27 RX ADMIN — Medication 250 MILLIGRAM(S): at 11:49

## 2023-06-27 RX ADMIN — DORZOLAMIDE HYDROCHLORIDE 1 DROP(S): 20 SOLUTION/ DROPS OPHTHALMIC at 05:32

## 2023-06-27 RX ADMIN — GABAPENTIN 300 MILLIGRAM(S): 400 CAPSULE ORAL at 05:30

## 2023-06-27 RX ADMIN — APIXABAN 5 MILLIGRAM(S): 2.5 TABLET, FILM COATED ORAL at 05:31

## 2023-06-27 RX ADMIN — Medication 250 MILLIGRAM(S): at 05:30

## 2023-06-27 RX ADMIN — ALBUTEROL 2 PUFF(S): 90 AEROSOL, METERED ORAL at 05:34

## 2023-06-27 RX ADMIN — DULOXETINE HYDROCHLORIDE 20 MILLIGRAM(S): 30 CAPSULE, DELAYED RELEASE ORAL at 11:49

## 2023-06-27 RX ADMIN — BUDESONIDE AND FORMOTEROL FUMARATE DIHYDRATE 2 PUFF(S): 160; 4.5 AEROSOL RESPIRATORY (INHALATION) at 11:48

## 2023-06-27 RX ADMIN — Medication 2: at 11:49

## 2023-06-27 RX ADMIN — Medication 40 MILLIGRAM(S): at 05:31

## 2023-06-27 RX ADMIN — DORZOLAMIDE HYDROCHLORIDE 1 DROP(S): 20 SOLUTION/ DROPS OPHTHALMIC at 14:50

## 2023-06-27 NOTE — DISCHARGE NOTE PROVIDER - NSDCCAREPROVSEEN_GEN_ALL_CORE_FT
Kitty, Elizabeth Fowler, Lalitha Cantu, Irwin Anderson, Cari Flores Ali A Kitty, Elizabeth Fowler, Lalitha Cantu, Irwin Anderson, Cari Flores, Lalitha Pelletier

## 2023-06-27 NOTE — PROGRESS NOTE ADULT - PROBLEM SELECTOR PLAN 6
takes flomax   c/w home meds

## 2023-06-27 NOTE — DISCHARGE NOTE NURSING/CASE MANAGEMENT/SOCIAL WORK - NSDCPEFALRISK_GEN_ALL_CORE
For information on Fall & Injury Prevention, visit: [email or web address] OR  [email or web address] OR  https://www.cdc.gov/steadi/patient.html

## 2023-06-27 NOTE — DISCHARGE NOTE PROVIDER - NSDCMRMEDTOKEN_GEN_ALL_CORE_FT
dorzolamide 2% ophthalmic solution: 1 application in each eye 2 times a day in both eyes  DULoxetine 20 mg oral delayed release capsule: 1 cap(s) orally 2 times a day  Eliquis 5 mg oral tablet: 1 tab(s) orally 2 times a day  fluticasone-salmeterol 250 mcg-50 mcg inhalation powder: 1 puff(s) inhaled 2 times a day  furosemide 20 mg oral tablet: 1 tab(s) orally once a day  gabapentin 300 mg oral capsule: 1 cap(s) orally every 12 hours  glimepiride 4 mg oral tablet: 1 tab(s) orally once a day  Janumet 50 mg-500 mg oral tablet: 1 tab(s) orally 2 times a day  latanoprost 0.005% ophthalmic solution: 1 drop(s) to each affected eye once a day (in the evening)  simvastatin 20 mg oral tablet: 1 tab(s) orally once a day (at bedtime)  tamsulosin 0.4 mg oral capsule: 1 cap(s) orally once a day   cephalexin 250 mg oral tablet: 1 tab(s) orally every 6 hours  dorzolamide 2% ophthalmic solution: 1 application in each eye 2 times a day in both eyes  DULoxetine 20 mg oral delayed release capsule: 1 cap(s) orally 2 times a day  Eliquis 5 mg oral tablet: 1 tab(s) orally 2 times a day  fluticasone-salmeterol 250 mcg-50 mcg inhalation powder: 1 puff(s) inhaled 2 times a day  gabapentin 300 mg oral capsule: 1 cap(s) orally every 12 hours  glimepiride 4 mg oral tablet: 1 tab(s) orally once a day  Janumet 50 mg-500 mg oral tablet: 1 tab(s) orally 2 times a day  latanoprost 0.005% ophthalmic solution: 1 drop(s) to each affected eye once a day (in the evening)  simvastatin 20 mg oral tablet: 1 tab(s) orally once a day (at bedtime)  tamsulosin 0.4 mg oral capsule: 1 cap(s) orally once a day   cephalexin 250 mg oral tablet: 1 tab(s) orally every 6 hours  dorzolamide 2% ophthalmic solution: 1 application in each eye 2 times a day in both eyes  DULoxetine 20 mg oral delayed release capsule: 1 cap(s) orally 2 times a day  Eliquis 5 mg oral tablet: 1 tab(s) orally 2 times a day  fluticasone-salmeterol 250 mcg-50 mcg inhalation powder: 1 puff(s) inhaled 2 times a day  gabapentin 300 mg oral capsule: 1 cap(s) orally every 12 hours  glimepiride 4 mg oral tablet: 1 tab(s) orally once a day  Janumet 50 mg-500 mg oral tablet: 1 tab(s) orally 2 times a day  Lasix 40 mg oral tablet: 1 tab(s) orally once a day  latanoprost 0.005% ophthalmic solution: 1 drop(s) to each affected eye once a day (in the evening)  simvastatin 20 mg oral tablet: 1 tab(s) orally once a day (at bedtime)  tamsulosin 0.4 mg oral capsule: 1 cap(s) orally once a day

## 2023-06-27 NOTE — PROGRESS NOTE ADULT - PROBLEM SELECTOR PLAN 5
takes simvastatin   c/w home meds

## 2023-06-27 NOTE — PROGRESS NOTE ADULT - PROVIDER SPECIALTY LIST ADULT
Podiatry
Infectious Disease
Internal Medicine
Podiatry
Internal Medicine

## 2023-06-27 NOTE — PROGRESS NOTE ADULT - PROBLEM SELECTOR PLAN 4
h/o HTN on Lasix   c/w home meds with holding parameters   Monitor BP

## 2023-06-27 NOTE — DISCHARGE NOTE PROVIDER - HOSPITAL COURSE
Patient is a 82M from home, ambulates independently, PMHx HTN, HLD, DM w/ Diabetic Neuropathy, p/w complaining of bilateral lower extremity swelling and pain x 2wk after a mechanical fall, now with non-healing wounds. Patient was admitted for cellulitis requiring IV antibiotics.     Patient was initially started on vancomycin and eventually cefazolin was added to antibiotic region. Patient improved clinically. Patient was then eventually started on oral antibiotic Cephalexin. Patient    Patient is a 82M from home, ambulates independently, PMHx HTN, HLD, DM w/ Diabetic Neuropathy, p/w complaining of bilateral lower extremity swelling and pain x 2wk after a mechanical fall, now with non-healing wounds. Patient was admitted for cellulitis requiring IV antibiotics.     Patient was initially started on vancomycin and eventually cefazolin was added to antibiotic region. Patient improved clinically. Patient was then eventually started on oral antibiotic Cephalexin. 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   Patient is a 82M from home, ambulates independently, PMHx HTN, HLD, DM w/ Diabetic Neuropathy, p/w complaining of bilateral lower extremity swelling and pain x 2wk after a mechanical fall, now with non-healing wounds. Patient was admitted for cellulitis requiring IV antibiotics.     Patient was initially started on vancomycin and eventually cefazolin was added to antibiotic region. Patient improved clinically. Patient was then eventually started on oral antibiotic Cephalexin. 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 Patient was continued on his home dose of Eliquis twice a day.     Patient has history of DM, and takes Janumet and Glimepiride. Patient oral hypoglycemics were held and patient was started on insulin sliding scale.     Patient has history of hypertension. His dose of Lasix was increased 40mg oral daily.     Patient has history of BPH. Patient was continued on his home dose of flomax.     Patient is stable for discharge.     This is only a summary of the hospital course. Please refer to patient chart for further details. Patient is a 82M from home, ambulates independently, PMHx HTN, HLD, DM w/ Diabetic Neuropathy, p/w complaining of bilateral lower extremity swelling and pain x 2wk after a mechanical fall, now with non-healing wounds. Patient was admitted for cellulitis requiring IV antibiotics.     Patient was initially started on vancomycin and eventually cefazolin was added to antibiotic region. Patient improved clinically. Patient was then eventually started on oral antibiotic Cephalexin. Patient also received LINDA of bilateral lower extremity to check if poor circulation is the cause of delayed wound healing. However, LINDA were normal. Blood cultures during last visit on 06/06/2023 were negative and were not repeated in this admission.     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 Patient was continued on his home dose of Eliquis twice a day.     Patient has history of DM, and takes Janumet and Glimepiride. Patient oral hypoglycemics were held and patient was started on insulin sliding scale.     Patient has history of hypertension. His dose of Lasix was increased 40mg oral daily.     Patient has history of BPH. Patient was continued on his home dose of flomax.     Patient is stable for discharge.     This is only a summary of the hospital course. Please refer to patient chart for further details.

## 2023-06-27 NOTE — DISCHARGE NOTE NURSING/CASE MANAGEMENT/SOCIAL WORK - PATIENT PORTAL LINK FT
You can access the FollowMyHealth Patient Portal offered by [facility] by registering at the following website: [email or web address]. By joining [facility]’s FollowMyHealth portal, you will also be able to view your health information using other applications (apps) compatible with our system.

## 2023-06-27 NOTE — DISCHARGE NOTE PROVIDER - ATTENDING DISCHARGE PHYSICAL EXAMINATION:
Psych: AAO x3  Neuro: No gross focal deficits; Power and sensation intact  CVS: S1S2 present, regular, no edema  Resp: BLAE+, No wheeze or Rhonchi  GI: Soft, BS+, Non tender, non distended  Extr: No  calf tenderness B/L Lower extremities  B/L LE edema +2 with superficial wounds  Skin: Warm and moist without any rashes

## 2023-06-27 NOTE — PROGRESS NOTE ADULT - SUBJECTIVE AND OBJECTIVE BOX
PGY-1 Progress Note discussed with attending    INTERVAL HPI/OVERNIGHT EVENTS:   MEDICATIONS  (STANDING):  apixaban 5 milliGRAM(s) Oral every 12 hours  budesonide 160 MICROgram(s)/formoterol 4.5 MICROgram(s) Inhaler 2 Puff(s) Inhalation two times a day  cephalexin 250 milliGRAM(s) Oral every 6 hours  dorzolamide 2% Ophthalmic Solution 1 Drop(s) Both EYES three times a day  DULoxetine 20 milliGRAM(s) Oral daily  furosemide    Tablet 40 milliGRAM(s) Oral daily  gabapentin 300 milliGRAM(s) Oral every 12 hours  insulin lispro (ADMELOG) corrective regimen sliding scale   SubCutaneous three times a day before meals  insulin lispro (ADMELOG) corrective regimen sliding scale   SubCutaneous at bedtime  latanoprost 0.005% Ophthalmic Solution 1 Drop(s) Both EYES at bedtime  polyethylene glycol 3350 17 Gram(s) Oral every 24 hours  senna 2 Tablet(s) Oral at bedtime  simvastatin 20 milliGRAM(s) Oral at bedtime  tamsulosin 0.4 milliGRAM(s) Oral at bedtime    MEDICATIONS  (PRN):  acetaminophen     Tablet .. 650 milliGRAM(s) Oral every 6 hours PRN Temp greater or equal to 38C (100.4F), Mild Pain (1 - 3)  albuterol    90 MICROgram(s) HFA Inhaler 2 Puff(s) Inhalation every 6 hours PRN Shortness of Breath and/or Wheezing  ondansetron Injectable 4 milliGRAM(s) IV Push every 8 hours PRN Nausea and/or Vomiting      REVIEW OF SYSTEMS:  CONSTITUTIONAL: No fever, weight loss, or fatigue  RESPIRATORY: No cough, wheezing, chills or hemoptysis; No shortness of breath  CARDIOVASCULAR: No chest pain, palpitations, dizziness, or leg swelling  GASTROINTESTINAL: No abdominal pain. No nausea, vomiting, or hematemesis; No diarrhea or constipation. No melena or hematochezia.  GENITOURINARY: No dysuria or hematuria, urinary frequency  NEUROLOGICAL: No headaches, memory loss, loss of strength, numbness, or tremors  SKIN: No itching, burning, rashes, or lesions     Vital Signs Last 24 Hrs  T(C): 36.5 (27 Jun 2023 05:17), Max: 37 (26 Jun 2023 13:10)  T(F): 97.7 (27 Jun 2023 05:17), Max: 98.6 (26 Jun 2023 13:10)  HR: 54 (27 Jun 2023 05:17) (54 - 62)  BP: 133/81 (27 Jun 2023 05:17) (123/71 - 133/81)  BP(mean): --  RR: 18 (27 Jun 2023 05:17) (18 - 18)  SpO2: 98% (27 Jun 2023 05:17) (97% - 98%)    Parameters below as of 27 Jun 2023 05:17  Patient On (Oxygen Delivery Method): room air        PHYSICAL EXAMINATION:  GENERAL: NAD, well built  HEAD:  Atraumatic, Normocephalic  EYES:  conjunctiva and sclera clear  NECK: Supple, No JVD, Normal thyroid  CHEST/LUNG: Clear to auscultation. Clear to percussion bilaterally; No rales, rhonchi, wheezing, or rubs  HEART: Regular rate and rhythm; No murmurs, rubs, or gallops  ABDOMEN: Soft, Nontender, Nondistended; Bowel sounds present  NERVOUS SYSTEM:  Alert & Oriented X3,    EXTREMITIES:  2+ Peripheral Pulses, No clubbing, cyanosis, or edema  SKIN: warm dry                          13.1   4.68  )-----------( 189      ( 27 Jun 2023 06:20 )             39.7     06-27    139  |  106  |  22<H>  ----------------------------<  155<H>  3.8   |  23  |  1.32<H>    Ca    9.3      27 Jun 2023 06:20  Phos  3.5     06-27  Mg     2.2     06-27    TPro  7.4  /  Alb  3.4<L>  /  TBili  0.5  /  DBili  x   /  AST  25  /  ALT  32  /  AlkPhos  73  06-26    LIVER FUNCTIONS - ( 26 Jun 2023 08:10 )  Alb: 3.4 g/dL / Pro: 7.4 g/dL / ALK PHOS: 73 U/L / ALT: 32 U/L DA / AST: 25 U/L / GGT: x                   CAPILLARY BLOOD GLUCOSE      RADIOLOGY & ADDITIONAL TESTS:                   PGY-1 Progress Note discussed with attending    INTERVAL HPI/OVERNIGHT EVENTS:   No acute overnight events. Patient denies any complains at this time.     MEDICATIONS  (STANDING):  apixaban 5 milliGRAM(s) Oral every 12 hours  budesonide 160 MICROgram(s)/formoterol 4.5 MICROgram(s) Inhaler 2 Puff(s) Inhalation two times a day  cephalexin 250 milliGRAM(s) Oral every 6 hours  dorzolamide 2% Ophthalmic Solution 1 Drop(s) Both EYES three times a day  DULoxetine 20 milliGRAM(s) Oral daily  furosemide    Tablet 40 milliGRAM(s) Oral daily  gabapentin 300 milliGRAM(s) Oral every 12 hours  insulin lispro (ADMELOG) corrective regimen sliding scale   SubCutaneous three times a day before meals  insulin lispro (ADMELOG) corrective regimen sliding scale   SubCutaneous at bedtime  latanoprost 0.005% Ophthalmic Solution 1 Drop(s) Both EYES at bedtime  polyethylene glycol 3350 17 Gram(s) Oral every 24 hours  senna 2 Tablet(s) Oral at bedtime  simvastatin 20 milliGRAM(s) Oral at bedtime  tamsulosin 0.4 milliGRAM(s) Oral at bedtime    MEDICATIONS  (PRN):  acetaminophen     Tablet .. 650 milliGRAM(s) Oral every 6 hours PRN Temp greater or equal to 38C (100.4F), Mild Pain (1 - 3)  albuterol    90 MICROgram(s) HFA Inhaler 2 Puff(s) Inhalation every 6 hours PRN Shortness of Breath and/or Wheezing  ondansetron Injectable 4 milliGRAM(s) IV Push every 8 hours PRN Nausea and/or Vomiting      REVIEW OF SYSTEMS:  CONSTITUTIONAL: No fever, weight loss, or fatigue  RESPIRATORY: No cough, wheezing, chills or hemoptysis; No shortness of breath  CARDIOVASCULAR: No chest pain, palpitations, dizziness, or leg swelling  GASTROINTESTINAL: No abdominal pain. No nausea, vomiting, or hematemesis; No diarrhea or constipation. No melena or hematochezia.  GENITOURINARY: No dysuria or hematuria, urinary frequency  NEUROLOGICAL: No headaches, memory loss, loss of strength, numbness, or tremors  SKIN: No itching, burning, rashes, or lesions     Vital Signs Last 24 Hrs  T(C): 36.5 (27 Jun 2023 05:17), Max: 37 (26 Jun 2023 13:10)  T(F): 97.7 (27 Jun 2023 05:17), Max: 98.6 (26 Jun 2023 13:10)  HR: 54 (27 Jun 2023 05:17) (54 - 62)  BP: 133/81 (27 Jun 2023 05:17) (123/71 - 133/81)  BP(mean): --  RR: 18 (27 Jun 2023 05:17) (18 - 18)  SpO2: 98% (27 Jun 2023 05:17) (97% - 98%)    Parameters below as of 27 Jun 2023 05:17  Patient On (Oxygen Delivery Method): room air        PHYSICAL EXAMINATION:  GENERAL: NAD, well built  HEAD:  Atraumatic, Normocephalic  EYES:  conjunctiva and sclera clear  NECK: Supple  CHEST/LUNG: Clear to auscultation. No rales, rhonchi, wheezing, or rubs  HEART: Regular rate and rhythm; No murmurs, rubs, or gallops  ABDOMEN: Soft, Nontender, Nondistended; Bowel sounds present  NERVOUS SYSTEM:  Alert & Oriented X3,    EXTREMITIES:  2+ Peripheral Pulses, b/l LE edema2+,   SKIN: bilateral lower extremity non-healing wound with granulation tissue with surrounding area of erythema, pain, warmth and tenderness covered by ace bandages.                          13.1   4.68  )-----------( 189      ( 27 Jun 2023 06:20 )             39.7     06-27    139  |  106  |  22<H>  ----------------------------<  155<H>  3.8   |  23  |  1.32<H>    Ca    9.3      27 Jun 2023 06:20  Phos  3.5     06-27  Mg     2.2     06-27    TPro  7.4  /  Alb  3.4<L>  /  TBili  0.5  /  DBili  x   /  AST  25  /  ALT  32  /  AlkPhos  73  06-26    LIVER FUNCTIONS - ( 26 Jun 2023 08:10 )  Alb: 3.4 g/dL / Pro: 7.4 g/dL / ALK PHOS: 73 U/L / ALT: 32 U/L DA / AST: 25 U/L / GGT: x                   CAPILLARY BLOOD GLUCOSE      RADIOLOGY & ADDITIONAL TESTS:

## 2023-06-27 NOTE — DISCHARGE NOTE PROVIDER - TIME SPENT: (MINUTES SPENT ON THE DISCHARGE SERVICE)
2022       Eli Dickerson MD  [address] [address]  Via In Basket      Patient: Jonas Palmer   YOB: 2022   Date of Visit: 2022       Dear Dr. Dickerson:    I saw your patient, Jonas Palmer, for an evaluation. Below are my notes for this visit with him.    If you have questions, please do not hesitate to call me.      Sincerely,        Simone Florentino MD        CC: No Recipients  Simone Florentino MD  2022  7:57 AM  Signed  2022    No chief complaint on file.      Problem List Items Addressed This Visit     None          HPI:    The patient comes in today for followup of his lower extremities.  He is not currently and any therapy.  The parents are concerned about his lack of progress.  He does not seem to have any discomfort.  They have not noticed any swelling redness warmth or bruising.    ROS:  14 point review of system is negative unless stated above.     Current Medications:      Summary of your Discharge Medications          Accurate as of May 23, 2022  1:21 PM. Always use your most recent med list.            Take these Medications      Details   lactulose 10 GM/15ML solution  Commonly known as: CHRONULAC   Take 2.3 mLs by mouth 2 times daily for 10 days.     polyethylene glycol 17 GM/SCOOP powder  Commonly known as: MiraLax   Stir and dissolve powder in any 4  ounces of beverage, then drink.              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 Hernández   • Inguinal hernia repair Bilateral 2021    Dr. Hernández       ALLERGIES:   Allergen Reactions   • Dairy Enzyme Formula   (Food Or Med) Nausea & Vomiting   • Milk    (Food Or Med) GI UPSET     Pooping blood   • Protein Nausea & Vomiting        Family History   Problem Relation Age of Onset   • Patient is unaware of any medical problems Mother    • Patient is unaware of any medical problems Father    • Patient is unaware of any medical problems Brother    • Diabetes Other        Examination:   There were no vitals taken for this visit.  There were no vitals filed for this visit.    Physical Exam:   Constitutional:  Well-developed, well-nourished male in no acute distress.  Psychiatric:  Normal affect  Skin: Warm, dry, intact without rash or lesion.  Neck: Normal appearing neck  Pulmonary: No labored respirations  Musculoskeletal:  Right Ankle Exam   Right ankle exam is normal.    Tenderness   The patient is experiencing no tenderness.  Swelling: none    Range of Motion   The patient has normal right ankle ROM.    Muscle Strength   The patient has normal right ankle strength.    Tests   Anterior drawer: negative  Varus tilt: negative    Other   Erythema: absent  Scars: absent  Sensation: normal  Pulse: present       Left Ankle Exam   Left ankle exam is normal.    Tenderness   The patient is experiencing no tenderness.   Swelling: none    Range of Motion   The patient has normal left ankle ROM.     Muscle Strength   The patient has normal left ankle strength.    Tests   Anterior drawer: negative  Varus tilt: negative    Other   Erythema: absent  Scars: absent  Sensation: normal  Pulse: present      Right Knee Exam   Right knee exam is normal.    Muscle Strength   The patient has normal right knee strength.    Tenderness   The patient is experiencing no tenderness.     Range of Motion   The patient has  normal right knee ROM.    Tests   Delmy:  Medial - negative Lateral - negative  Varus: negative Valgus: negative  Lachman:  Anterior - negative    Posterior - negative  Drawer:  Anterior - negative    Posterior - negative  Pivot shift: negative  Patellar apprehension: negative    Other   Erythema: absent  Scars: absent  Sensation: normal  Pulse: present  Swelling: none  Effusion: no effusion present    Comments:  No joint line pain      Left Knee Exam   Left knee exam is normal.    Muscle Strength   The patient has normal left knee strength.    Tenderness   The patient is experiencing no tenderness.     Range of Motion   The patient has normal left knee ROM.    Tests   Delmy:  Medial - negative Lateral - negative  Varus: negative Valgus: negative  Lachman:  Anterior - negative    Posterior - negative  Drawer:  Anterior - negative     Posterior - negative  Pivot shift: negative  Patellar apprehension: negative    Other   Erythema: absent  Scars: absent  Sensation: normal  Pulse: present  Swelling: none  Effusion: no effusion present    Comments:  No joint line pain      Right Hip Exam   Right hip exam is normal.     Tenderness   The patient is experiencing no tenderness.     Range of Motion   The patient has normal right hip ROM.    Muscle Strength   The patient has normal right hip strength.    Other   Erythema: absent  Scars: absent  Sensation: normal  Pulse: present      Left Hip Exam   Left hip exam is normal.    Tenderness   The patient is experiencing no tenderness.     Range of Motion   The patient has normal left hip ROM.    Muscle Strength   The patient has normal left hip strength.     Other   Erythema: absent  Scars: absent  Sensation: normal  Pulse: present             Images/Results:  Extremity alignment x-rays were obtained and are normal.    Assessment:  I reviewed the x-ray and clinical findings with the family.   At this time we will restart his therapy program.    Plan:  I will see him back in  6 months for clinical followup.      Total time spent on encounter 30 minutes.  This includes pre-charting, chart review, documenting, and referring/communicating with other health care professionals.    No follow-ups on file.               39

## 2023-06-27 NOTE — DISCHARGE NOTE PROVIDER - CARE PROVIDER_API CALL
Jovany Puckett  Internal Medicine  [address] [place] [address]  Phone: [phone]  Fax: [phone]  Follow Up Time:

## 2023-06-27 NOTE — PROGRESS NOTE ADULT - PROBLEM SELECTOR PLAN 7
Eliquis  Fall Precautions

## 2023-06-27 NOTE — PROGRESS NOTE ADULT - PROBLEM SELECTOR PLAN 1
p/w worsening LE edema x2wk s/p mechanical fall with b/l anterior shin injuries.  (+) b/l LE edema +2 extending to the knee w/ b/l non-healing wounds w/ granulation tissue.  failed o/p abx on keflex and clindamycin   aseptic appearing, VSS with no leukocytosis which can be falsely low in the setting of recent abx use   D/C cefazolin and vancomycin (renally dosed)  Start Cephlexin 250 PO QID till 06/29/2023  - xray tibia/fibula to r/o gas gangrene/ osteomyelitis - negative.   - F/U LINDA. If positive, vascular consult.   - BCx 6/16 NGTD  - ID consulted: Dr. Hernandez  - Podiatry consulted p/w worsening LE edema x2wk s/p mechanical fall with b/l anterior shin injuries.  (+) b/l LE edema +2 extending to the knee w/ b/l non-healing wounds w/ granulation tissue.  aseptic appearing, VSS with no leukocytosis which can be falsely low in the setting of recent abx use   D/C cefazolin and vancomycin (renally dosed)  Start Cepahlexin 250 PO QID till 06/29/2023  - xray tibia/fibula to r/o gas gangrene/ osteomyelitis - negative.   - LINDA normal.    - BCx 6/16 NGTD  - ID consulted: Dr. Hernandez  - Podiatry consulted

## 2023-06-27 NOTE — PROGRESS NOTE ADULT - PROBLEM SELECTOR PROBLEM 2
History of DVT of lower extremity

## 2023-06-27 NOTE — DISCHARGE NOTE PROVIDER - NSDCCPCAREPLAN_GEN_ALL_CORE_FT
PRINCIPAL DISCHARGE DIAGNOSIS  Diagnosis: Cellulitis  Assessment and Plan of Treatment: You presented with complaining of bilateral lower extremity swelling and pain since 2 week after a mechanical fall, now with non-healing wounds. YOuwas admitted for cellulitis requiring IV antibiotics.   Patient was initially started on vancomycin and eventually cefazolin was added to antibiotic region. Patient improved clinically. Patient was then eventually started on oral antibiotic Cephalexin. 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 Patient was continued on his home dose of Eliquis twice a day.   Patient has history of DM, and takes Janumet and Glimepiride. Patient oral hypoglycemics were held and patient was started on insulin sliding scale.   Patient has history of hypertension. His dose of Lasix was increased 40mg oral daily.   Patient has history of BPH. Patient was continued on his home dose of flomax.   Patient is stable for discharge.   This is only a summary of the hospital course. Please refer to patient chart for further details.      SECONDARY DISCHARGE DIAGNOSES  Diagnosis: History of DVT of lower extremity  Assessment and Plan of Treatment:     Diagnosis: HTN (hypertension)  Assessment and Plan of Treatment:     Diagnosis: DM (diabetes mellitus)  Assessment and Plan of Treatment:      PRINCIPAL DISCHARGE DIAGNOSIS  Diagnosis: Cellulitis  Assessment and Plan of Treatment: You presented with complaining of bilateral lower extremity swelling and pain since 2 week after a mechanical fall, now with non-healing wounds. You were admitted for cellulitis requiring IV antibiotics.   You were initially started on vancomycin and eventually cefazolin was added to antibiotic regimen. You improved clinically. You were then eventually started on oral antibiotic Cephalexin. You also received LINDA of bilateral lower extremity to check if poor circulation is the cause of delayed wound healing. However, LINDA were normal. Blood cultures during last visit on 06/06/2023 were negative and were not repeated in this admission.   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 You were continued on his home dose of Eliquis twice a day.    Diagnosis: DM (diabetes mellitus)  Assessment and Plan of Treatment: You have history of DM, and take Janumet and Glimepiride at home. Your oral hypoglycemics were held and patient was started on insulin sliding scale.   Your HBA1C was checked and it was 7.1. This shows a well controlled DM. Please continue taking medication as prescribed.    Diagnosis: HTN (hypertension)  Assessment and Plan of Treatment: You have history of hypertension. Your dose of Lasix was increased 40mg oral daily.   Please continue taking medication as prescribed.    Diagnosis: BPH (benign prostatic hyperplasia)  Assessment and Plan of Treatment: You have history of BPH. You were continued on your home dose of flomax.     PRINCIPAL DISCHARGE DIAGNOSIS  Diagnosis: Cellulitis  Assessment and Plan of Treatment: You presented with complaining of bilateral lower extremity swelling and pain since 2 week after a mechanical fall, now with non-healing wounds. You were admitted for cellulitis requiring IV antibiotics.   You were initially started on vancomycin and eventually cefazolin was added to antibiotic regimen. You improved clinically. You were then eventually started on oral antibiotic Cephalexin. You also received LINDA of bilateral lower extremity to check if poor circulation is the cause of delayed wound healing. However, LINDA were normal. Blood cultures during last visit on 06/06/2023 were negative and were not repeated in this admission.   Please continue taking medication as precribed below  1) Cephalexin 250mg, 1 tablet every 6 hours for 3 more days.   Please follow with your PCP for further manegement of disease. Please also get BMP in 1 week at your PCP office.         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 You were continued on his home dose of Eliquis twice a day.    Diagnosis: DM (diabetes mellitus)  Assessment and Plan of Treatment: You have history of DM, and take Janumet and Glimepiride at home. Your oral hypoglycemics were held and patient was started on insulin sliding scale.   Your HBA1C was checked and it was 7.1. This shows a well controlled DM. Please continue taking medication as prescribed.    Diagnosis: HTN (hypertension)  Assessment and Plan of Treatment: You have history of hypertension. Your dose of Lasix was increased 40mg oral daily.   Please continue taking medication as prescribed.  Please follow with your PCP for further manegement of disease. Please also get BMP in 1 week at your PCP office.    Diagnosis: BPH (benign prostatic hyperplasia)  Assessment and Plan of Treatment: You have history of BPH. You were continued on your home dose of flomax.     PRINCIPAL DISCHARGE DIAGNOSIS  Diagnosis: Cellulitis  Assessment and Plan of Treatment: You presented with complaining of worsening lower extremity swelling in both legs and pain since 2 week after a mechanical fall, now with non-healing wounds. You was noted to have underlying inflammation in legs; You were admitted for cellulitis requiring Intravenous antibiotics.   You were initially started on vancomycin and eventually cefazolin was added to antibiotic regimen. You was evalauated by an Infectious Disease specialist Dr. Cari Hernandez.You improved clinically with IV antibiotics. You were then eventually started on oral antibiotic Cephalexin. You also received LINDA of bilateral lower extremity to check if poor circulation is the cause of delayed wound healing. However, LINDA were relatively normal. Blood cultures during last visit on 06/06/2023 were negative and were not repeated in this admission.   Please continue taking medication as precribed below  1) Cephalexin 250mg, 1 tablet every 6 hours for 3 more days.   2) You was also conunseld on taking Yogh[place] twice daily while on oral antibiotics to minimize Antibiotic induced diarrhea.   Please follow with your PCP for further manegement of disease. Please also get BMP in 1 week at your PCP office.         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 You were continued on his home dose of Eliquis twice a day.    Diagnosis: HTN (hypertension)  Assessment and Plan of Treatment: You have history of hypertension and chronically on low dose water Pill Furosemide 20 mg daily at home. Your dose of Lasix was increased to 40mg oral daily with much imp[rovedleg swelling.   Please continue taking medication as prescribed. Please note that we need to monitor your kidney function closely while on water pill as dose needs adjusted. Please get blood work repeated in 5 to 7 days with your PCP Dr. Puckett as outpatient       Diagnosis: DM (diabetes mellitus)  Assessment and Plan of Treatment: You have history of DM, and take Janumet and Glimepiride at home. Your oral hypoglycemics were held and patient was started on insulin sliding scale.   Your HBA1C was checked and it was 7.1. This shows a relatively well controlled DM. Please continue taking medication as prescribed. Please monitor kidney function closly as kidney    Diagnosis: BPH (benign prostatic hyperplasia)  Assessment and Plan of Treatment: You have history of BPH. You were continued on your home dose of flomax.

## 2023-06-29 RX ORDER — MUPIROCIN 20 MG/G
1 OINTMENT TOPICAL
Refills: 0
Start: 2023-06-29 | End: 2023-07-18

## 2023-07-24 ENCOUNTER — EMERGENCY (EMERGENCY)
Facility: HOSPITAL | Age: 83
LOS: 1 days | Discharge: HOME | End: 2023-07-24
Attending: EMERGENCY MEDICINE
Payer: COMMERCIAL

## 2023-07-24 VITALS
TEMPERATURE: 99 F | OXYGEN SATURATION: 100 % | DIASTOLIC BLOOD PRESSURE: 70 MMHG | HEART RATE: 84 BPM | RESPIRATION RATE: 18 BRPM | SYSTOLIC BLOOD PRESSURE: 122 MMHG | WEIGHT: 179.02 LBS | HEIGHT: 66 IN

## 2023-07-24 DIAGNOSIS — Z98.890 OTHER SPECIFIED POSTPROCEDURAL STATES: Chronic | ICD-10-CM

## 2023-07-24 PROCEDURE — 99283 EMERGENCY DEPT VISIT LOW MDM: CPT

## 2023-07-24 PROCEDURE — 99282 EMERGENCY DEPT VISIT SF MDM: CPT

## 2023-07-24 NOTE — ED PROVIDER NOTE - CLINICAL SUMMARY MEDICAL DECISION MAKING FREE TEXT BOX
82 yr old male with hx of HTN, HLD, DM w/ Diabetic Neuropathy and chronic venous stasis with ulcers presents to ed c/o chronic worsening leg ulcers since 6/6/23. pt has a VNS that comes everyday to change dsg, no fever, worse when legs dependent but improves with elevation. hasn't seen a vascular or wound care doctor     chronic venous stasis with ulcers that are not infected. dsg replaced. advise to see wound care and vascular MD. continue with home therapy. We will try to get you an appointment with the wound care and vascular MD

## 2023-07-24 NOTE — ED PROVIDER NOTE - NSFOLLOWUPINSTRUCTIONS_ED_ALL_ED_FT
chronic venous stasis with ulcers that are not infected. dsg replaced. advise to see wound care and vascular MD. continue with home therapy. We will try to get you an appointment with the wound care and vascular MD

## 2023-07-24 NOTE — ED PROVIDER NOTE - MUSCULOSKELETAL, MLM
ambulates with cane- b/l chronic venous stasis w/o erythema. granulation tissue at venous stasis ulcer noted b/l legs

## 2023-07-24 NOTE — ED PROVIDER NOTE - OBJECTIVE STATEMENT
82 yr old male with hx of HTN, HLD, DM w/ Diabetic Neuropathy and chronic venous stasis with ulcers presents to ed c/o chronic worsening leg ulcers since 6/6/23. pt has a VNS that comes everyday to change dsg, no fever, worse when legs dependent but improves with elevation. hasn't seen a vascular or wound care doctor

## 2023-07-24 NOTE — ED PROVIDER NOTE - PATIENT PORTAL LINK FT
You can access the FollowMyHealth Patient Portal offered by [facility] by registering at the following website: [email or web address]. By joining "[employer]’s FollowMyHealth portal, you will also be able to view your health information using other applications (apps) compatible with our system.

## 2023-07-25 NOTE — ED ADULT NURSE NOTE - NS ED NURSE LEVEL OF CONSCIOUSNESS ORIENTATION
Oriented - self; Oriented - place; Oriented - time Olumiant Counseling: I discussed with the patient the risks of Olumiant therapy including but not limited to upper respiratory tract infections, shingles, cold sores, and nausea. Live vaccines should be avoided.  This medication has been linked to serious infections; higher rate of mortality; malignancy and lymphoproliferative disorders; major adverse cardiovascular events; thrombosis; gastrointestinal perforations; neutropenia; lymphopenia; anemia; liver enzyme elevations; and lipid elevations.

## 2023-07-28 PROBLEM — Z00.00 ENCOUNTER FOR PREVENTIVE HEALTH EXAMINATION: Status: ACTIVE | Noted: 2023-07-28

## 2023-07-31 ENCOUNTER — OUTPATIENT (OUTPATIENT)
Dept: OUTPATIENT SERVICES | Facility: HOSPITAL | Age: 83
LOS: 1 days | End: 2023-07-31
Payer: COMMERCIAL

## 2023-07-31 ENCOUNTER — APPOINTMENT (OUTPATIENT)
Dept: WOUND CARE | Facility: CLINIC | Age: 83
End: 2023-07-31
Payer: MEDICARE

## 2023-07-31 VITALS
BODY MASS INDEX: 25.76 KG/M2 | HEART RATE: 76 BPM | OXYGEN SATURATION: 99 % | SYSTOLIC BLOOD PRESSURE: 126 MMHG | WEIGHT: 170 LBS | HEIGHT: 68 IN | DIASTOLIC BLOOD PRESSURE: 72 MMHG | TEMPERATURE: 98.2 F

## 2023-07-31 DIAGNOSIS — E11.628 TYPE 2 DIABETES MELLITUS WITH OTHER SKIN COMPLICATIONS: ICD-10-CM

## 2023-07-31 DIAGNOSIS — L97.929 NON-PRESSURE CHRONIC ULCER OF UNSPECIFIED PART OF RIGHT LOWER LEG WITH UNSPECIFIED SEVERITY: ICD-10-CM

## 2023-07-31 DIAGNOSIS — L97.919 NON-PRESSURE CHRONIC ULCER OF UNSPECIFIED PART OF RIGHT LOWER LEG WITH UNSPECIFIED SEVERITY: ICD-10-CM

## 2023-07-31 DIAGNOSIS — Z98.890 OTHER SPECIFIED POSTPROCEDURAL STATES: Chronic | ICD-10-CM

## 2023-07-31 PROCEDURE — G0463: CPT | Mod: 25

## 2023-07-31 PROCEDURE — 11042 DBRDMT SUBQ TIS 1ST 20SQCM/<: CPT

## 2023-07-31 PROCEDURE — 99215 OFFICE O/P EST HI 40 MIN: CPT | Mod: 25

## 2023-08-04 NOTE — PHYSICAL EXAM
[Normal Breath Sounds] : Normal breath sounds [Normal Heart Sounds] : normal heart sounds [2+] : left 2+ [Ankle Swelling Bilaterally] : bilaterally  [Ankle Swelling On The Left] : moderate [Please See PDF for Tissue Analytics] : Please See PDF for [employer]. [No HSM] : no hepatosplenomegaly [Skin Ulcer] : ulcer [Alert] : alert [Oriented to Person] : oriented to person [Oriented to Place] : oriented to place [Oriented to Time] : oriented to time [Calm] : calm [JVD] : no jugular venous distention  [de-identified] : NAD, well groomed [de-identified] : non traumatic [de-identified] : supple [de-identified] : equal chest rise [de-identified] : soft non tender

## 2023-08-04 NOTE — HISTORY OF PRESENT ILLNESS
[FreeTextEntry1] : Mr. ESUTARDO LARIOS   presents to the office with a wound for 8- weeks duration, s/p fall in june, wounds hasnt healed since then.  The wound is located on  the bilateral leg ulcers.  The patient has complaints of non healing wounds.   The patient has been dressing the wound with muporocin.  The patient denies fevers or chills.  The patient has localized pain to the wound upon dressing changes.  The patient has no other complaints or associated symptoms.

## 2023-08-04 NOTE — ASSESSMENT
[Stable] : stable [Home] : Home [FreeTextEntry1] : Wound Assessment and Plan:  The patient presents with a wound to the bilateral leg ulcers. Swelling noted to the extremity.   LINDA/PVR and standing venous reflux study scheduled. No clinical sign of infection wound covered in yellow thick slough s/p excisional debridment Recommendation: Apply lidocaine or topical anesthetic if needed to reduce pain upon washing the wound. Wash wound with ----Dove skin sensitive soap and clean water  Apply ---- medical grade honey, drawtex Apply ----dynaflex Change dressing ---every other day Leg elevation as tolerated Encouraged ambulation or exercise. Optimization of nutrition. Offloading to the wound site.  Nursing orders given Return after completion of Vascular Studies Return in 2 weeks

## 2023-08-09 DIAGNOSIS — L97.919 NON-PRESSURE CHRONIC ULCER OF UNSPECIFIED PART OF RIGHT LOWER LEG WITH UNSPECIFIED SEVERITY: ICD-10-CM

## 2023-08-09 DIAGNOSIS — L97.929 NON-PRESSURE CHRONIC ULCER OF UNSPECIFIED PART OF LEFT LOWER LEG WITH UNSPECIFIED SEVERITY: ICD-10-CM

## 2023-08-14 ENCOUNTER — APPOINTMENT (OUTPATIENT)
Dept: WOUND CARE | Facility: CLINIC | Age: 83
End: 2023-08-14

## 2023-09-05 ENCOUNTER — APPOINTMENT (OUTPATIENT)
Dept: VASCULAR SURGERY | Facility: CLINIC | Age: 83
End: 2023-09-05

## 2023-09-21 NOTE — PATIENT PROFILE ADULT - CAREGIVER RELATION TO PATIENT
AVS reviewed with patient and mom at bedside. All questions answered and all concerns addressed. Patient has a headache 8/10 pain, medicated as ordered.
IV access removed. Patient discharged via wheelchair with all belongings off unit.
Daughter

## 2023-11-06 NOTE — ED ADULT NURSE NOTE - CHPI ED NUR RELIEVING FX
Patient arrived with grandmother. Reported that they were fitted for crutches, brace, and provided with exercises by previous provider. Educated patient on expectations prior to surgery and immediately following surgery. Encouraged patient to reach out with any questions as they arise.    none

## 2023-11-30 NOTE — H&P ADULT - MLM HIDDEN
Problem: Postoperative Care  Goal: Vital signs are maintained within parameters  Outcome: Outcome Not Met, Continue to Monitor  Goal: Elimination status is maintained/returned to baseline  Outcome: Outcome Not Met, Continue to Monitor  Goal: Oral intake is resumed and tolerated  Outcome: Outcome Not Met, Continue to Monitor  Goal: Activity level is resumed to level needed for d/c  Outcome: Outcome Not Met, Continue to Monitor  Goal: Verbalizes understanding of postoperative care in the hospital and after d/c  Description: Document on Patient Education Activity  Outcome: Outcome Not Met, Continue to Monitor      yes

## 2023-12-06 NOTE — ED ADULT NURSE NOTE - CHPI ED NUR SYMPTOMS NEG
yes
no back pain/no chills/no congestion/no diaphoresis/no dizziness/no fever/no nausea/no shortness of breath/no syncope/no vomiting

## 2024-05-02 NOTE — ED ADULT NURSE NOTE - NSIMPLEMENTINTERV_GEN_ALL_ED
Implemented All Universal Safety Interventions:  [place] to call system. Call bell, personal items and telephone within reach. Instruct patient to call for assistance. Room bathroom lighting operational. Non-slip footwear when patient is off stretcher. Physically safe environment: no spills, clutter or unnecessary equipment. Stretcher in lowest position, wheels locked, appropriate side rails in place. show

## 2025-05-06 NOTE — PROGRESS NOTE ADULT - PROBLEM/PLAN-1
DISPLAY PLAN FREE TEXT
No assistance needed

## 2025-07-29 NOTE — PHYSICAL THERAPY INITIAL EVALUATION ADULT - PHYSICAL ASSIST/NONPHYSICAL ASSIST: STAND/SIT, REHAB EVAL
Called and left message asking to go over genetic testing results. Left my name and number.      1 person assist